# Patient Record
Sex: MALE | Race: WHITE | NOT HISPANIC OR LATINO | Employment: FULL TIME | URBAN - METROPOLITAN AREA
[De-identification: names, ages, dates, MRNs, and addresses within clinical notes are randomized per-mention and may not be internally consistent; named-entity substitution may affect disease eponyms.]

---

## 2023-05-06 ENCOUNTER — APPOINTMENT (EMERGENCY)
Dept: RADIOLOGY | Facility: HOSPITAL | Age: 78
End: 2023-05-06

## 2023-05-06 ENCOUNTER — HOSPITAL ENCOUNTER (INPATIENT)
Facility: HOSPITAL | Age: 78
LOS: 3 days | Discharge: HOME | End: 2023-05-09
Attending: EMERGENCY MEDICINE | Admitting: HOSPITALIST

## 2023-05-06 DIAGNOSIS — I50.9 ACUTE ON CHRONIC CONGESTIVE HEART FAILURE, UNSPECIFIED HEART FAILURE TYPE (HCC): ICD-10-CM

## 2023-05-06 DIAGNOSIS — I50.9 ACUTE EXACERBATION OF CHF (CONGESTIVE HEART FAILURE) (HCC): Primary | ICD-10-CM

## 2023-05-06 DIAGNOSIS — I48.91 ATRIAL FIBRILLATION (HCC): ICD-10-CM

## 2023-05-06 DIAGNOSIS — I27.20 PULMONARY HYPERTENSION (HCC): ICD-10-CM

## 2023-05-06 PROBLEM — E11.65 TYPE 2 DIABETES MELLITUS WITH HYPERGLYCEMIA, WITHOUT LONG-TERM CURRENT USE OF INSULIN (HCC): Status: ACTIVE | Noted: 2023-05-06

## 2023-05-06 PROBLEM — I10 PRIMARY HYPERTENSION: Status: ACTIVE | Noted: 2023-05-06

## 2023-05-06 LAB
2HR DELTA HS TROPONIN: 0 NG/L
4HR DELTA HS TROPONIN: 2 NG/L
ANION GAP SERPL CALCULATED.3IONS-SCNC: 7 MMOL/L (ref 4–13)
APTT PPP: 32 SECONDS (ref 23–37)
ATRIAL RATE: 174 BPM
BASOPHILS # BLD AUTO: 0.03 THOUSANDS/ÂΜL (ref 0–0.1)
BASOPHILS NFR BLD AUTO: 0 % (ref 0–1)
BNP SERPL-MCNC: 559 PG/ML (ref 0–100)
BUN SERPL-MCNC: 32 MG/DL (ref 5–25)
CALCIUM SERPL-MCNC: 9.1 MG/DL (ref 8.4–10.2)
CARDIAC TROPONIN I PNL SERPL HS: 20 NG/L
CARDIAC TROPONIN I PNL SERPL HS: 20 NG/L
CARDIAC TROPONIN I PNL SERPL HS: 22 NG/L
CHLORIDE SERPL-SCNC: 106 MMOL/L (ref 96–108)
CHOLEST SERPL-MCNC: 104 MG/DL
CO2 SERPL-SCNC: 30 MMOL/L (ref 21–32)
CREAT SERPL-MCNC: 0.99 MG/DL (ref 0.6–1.3)
EOSINOPHIL # BLD AUTO: 0.08 THOUSAND/ÂΜL (ref 0–0.61)
EOSINOPHIL NFR BLD AUTO: 1 % (ref 0–6)
ERYTHROCYTE [DISTWIDTH] IN BLOOD BY AUTOMATED COUNT: 16.5 % (ref 11.6–15.1)
GFR SERPL CREATININE-BSD FRML MDRD: 73 ML/MIN/1.73SQ M
GLUCOSE SERPL-MCNC: 126 MG/DL (ref 65–140)
GLUCOSE SERPL-MCNC: 135 MG/DL (ref 65–140)
GLUCOSE SERPL-MCNC: 144 MG/DL (ref 65–140)
GLUCOSE SERPL-MCNC: 220 MG/DL (ref 65–140)
HCT VFR BLD AUTO: 49.1 % (ref 36.5–49.3)
HDLC SERPL-MCNC: 44 MG/DL
HGB BLD-MCNC: 15.4 G/DL (ref 12–17)
IMM GRANULOCYTES # BLD AUTO: 0.04 THOUSAND/UL (ref 0–0.2)
IMM GRANULOCYTES NFR BLD AUTO: 1 % (ref 0–2)
INR PPP: 1.24 (ref 0.84–1.19)
LDLC SERPL CALC-MCNC: 47 MG/DL (ref 0–100)
LYMPHOCYTES # BLD AUTO: 0.92 THOUSANDS/ÂΜL (ref 0.6–4.47)
LYMPHOCYTES NFR BLD AUTO: 13 % (ref 14–44)
MAGNESIUM SERPL-MCNC: 1.9 MG/DL (ref 1.9–2.7)
MCH RBC QN AUTO: 31.7 PG (ref 26.8–34.3)
MCHC RBC AUTO-ENTMCNC: 31.4 G/DL (ref 31.4–37.4)
MCV RBC AUTO: 101 FL (ref 82–98)
MONOCYTES # BLD AUTO: 0.65 THOUSAND/ÂΜL (ref 0.17–1.22)
MONOCYTES NFR BLD AUTO: 9 % (ref 4–12)
NEUTROPHILS # BLD AUTO: 5.35 THOUSANDS/ÂΜL (ref 1.85–7.62)
NEUTS SEG NFR BLD AUTO: 76 % (ref 43–75)
NRBC BLD AUTO-RTO: 0 /100 WBCS
PLATELET # BLD AUTO: 190 THOUSANDS/UL (ref 149–390)
PMV BLD AUTO: 9.9 FL (ref 8.9–12.7)
POTASSIUM SERPL-SCNC: 4.1 MMOL/L (ref 3.5–5.3)
PROTHROMBIN TIME: 15.8 SECONDS (ref 11.6–14.5)
QRS AXIS: -67 DEGREES
QRSD INTERVAL: 108 MS
QT INTERVAL: 420 MS
QTC INTERVAL: 472 MS
RBC # BLD AUTO: 4.86 MILLION/UL (ref 3.88–5.62)
SODIUM SERPL-SCNC: 143 MMOL/L (ref 135–147)
T WAVE AXIS: 98 DEGREES
TRIGL SERPL-MCNC: 64 MG/DL
TSH SERPL DL<=0.05 MIU/L-ACNC: 5.13 UIU/ML (ref 0.45–4.5)
VENTRICULAR RATE: 76 BPM
WBC # BLD AUTO: 7.07 THOUSAND/UL (ref 4.31–10.16)

## 2023-05-06 RX ORDER — CLOPIDOGREL BISULFATE 75 MG/1
75 TABLET ORAL DAILY
Status: DISCONTINUED | OUTPATIENT
Start: 2023-05-06 | End: 2023-05-07

## 2023-05-06 RX ORDER — INSULIN LISPRO 100 [IU]/ML
2-12 INJECTION, SOLUTION INTRAVENOUS; SUBCUTANEOUS
Status: DISCONTINUED | OUTPATIENT
Start: 2023-05-06 | End: 2023-05-09 | Stop reason: HOSPADM

## 2023-05-06 RX ORDER — CARVEDILOL 12.5 MG/1
25 TABLET ORAL
Status: DISCONTINUED | OUTPATIENT
Start: 2023-05-06 | End: 2023-05-07

## 2023-05-06 RX ORDER — FUROSEMIDE 20 MG/1
20 TABLET ORAL DAILY
COMMUNITY
End: 2023-05-09

## 2023-05-06 RX ORDER — HYDROCHLOROTHIAZIDE 25 MG/1
25 TABLET ORAL DAILY
COMMUNITY
End: 2023-05-09

## 2023-05-06 RX ORDER — ASPIRIN 81 MG/1
81 TABLET ORAL DAILY
COMMUNITY

## 2023-05-06 RX ORDER — ASPIRIN 325 MG
325 TABLET ORAL DAILY
COMMUNITY
End: 2023-05-09

## 2023-05-06 RX ORDER — CLOPIDOGREL BISULFATE 75 MG/1
75 TABLET ORAL DAILY
COMMUNITY
End: 2023-05-09

## 2023-05-06 RX ORDER — ASPIRIN 325 MG
325 TABLET ORAL DAILY
Status: DISCONTINUED | OUTPATIENT
Start: 2023-05-06 | End: 2023-05-06

## 2023-05-06 RX ORDER — ASPIRIN 81 MG/1
81 TABLET ORAL DAILY
Status: DISCONTINUED | OUTPATIENT
Start: 2023-05-06 | End: 2023-05-09 | Stop reason: HOSPADM

## 2023-05-06 RX ORDER — FUROSEMIDE 10 MG/ML
40 INJECTION INTRAMUSCULAR; INTRAVENOUS ONCE
Status: COMPLETED | OUTPATIENT
Start: 2023-05-06 | End: 2023-05-06

## 2023-05-06 RX ORDER — FUROSEMIDE 10 MG/ML
40 INJECTION INTRAMUSCULAR; INTRAVENOUS
Status: DISCONTINUED | OUTPATIENT
Start: 2023-05-06 | End: 2023-05-08

## 2023-05-06 RX ORDER — LOSARTAN POTASSIUM 100 MG/1
100 TABLET ORAL DAILY
COMMUNITY

## 2023-05-06 RX ORDER — ROSUVASTATIN CALCIUM 10 MG/1
10 TABLET, COATED ORAL DAILY
COMMUNITY

## 2023-05-06 RX ORDER — CARVEDILOL 25 MG/1
25 TABLET ORAL DAILY
COMMUNITY
End: 2023-05-09

## 2023-05-06 RX ORDER — PRAVASTATIN SODIUM 80 MG/1
80 TABLET ORAL
Status: DISCONTINUED | OUTPATIENT
Start: 2023-05-06 | End: 2023-05-09 | Stop reason: HOSPADM

## 2023-05-06 RX ORDER — INSULIN LISPRO 100 [IU]/ML
1-5 INJECTION, SOLUTION INTRAVENOUS; SUBCUTANEOUS
Status: DISCONTINUED | OUTPATIENT
Start: 2023-05-06 | End: 2023-05-09 | Stop reason: HOSPADM

## 2023-05-06 RX ORDER — POTASSIUM CHLORIDE 20 MEQ/1
20 TABLET, EXTENDED RELEASE ORAL DAILY
Status: DISCONTINUED | OUTPATIENT
Start: 2023-05-06 | End: 2023-05-09 | Stop reason: HOSPADM

## 2023-05-06 RX ORDER — MELATONIN
1000 DAILY
Status: DISCONTINUED | OUTPATIENT
Start: 2023-05-06 | End: 2023-05-09 | Stop reason: HOSPADM

## 2023-05-06 RX ORDER — LOSARTAN POTASSIUM 50 MG/1
100 TABLET ORAL DAILY
Status: DISCONTINUED | OUTPATIENT
Start: 2023-05-06 | End: 2023-05-09 | Stop reason: HOSPADM

## 2023-05-06 RX ADMIN — FUROSEMIDE 40 MG: 10 INJECTION, SOLUTION INTRAMUSCULAR; INTRAVENOUS at 09:04

## 2023-05-06 RX ADMIN — APIXABAN 5 MG: 5 TABLET, FILM COATED ORAL at 17:06

## 2023-05-06 RX ADMIN — LOSARTAN POTASSIUM 100 MG: 50 TABLET, FILM COATED ORAL at 11:28

## 2023-05-06 RX ADMIN — CLOPIDOGREL BISULFATE 75 MG: 75 TABLET ORAL at 11:29

## 2023-05-06 RX ADMIN — CARVEDILOL 25 MG: 12.5 TABLET, FILM COATED ORAL at 11:28

## 2023-05-06 RX ADMIN — FUROSEMIDE 40 MG: 10 INJECTION, SOLUTION INTRAMUSCULAR; INTRAVENOUS at 17:06

## 2023-05-06 RX ADMIN — PRAVASTATIN SODIUM 80 MG: 80 TABLET ORAL at 17:06

## 2023-05-06 RX ADMIN — APIXABAN 5 MG: 5 TABLET, FILM COATED ORAL at 11:28

## 2023-05-06 RX ADMIN — POTASSIUM CHLORIDE 20 MEQ: 1500 TABLET, EXTENDED RELEASE ORAL at 11:28

## 2023-05-06 RX ADMIN — Medication 1000 UNITS: at 11:28

## 2023-05-06 RX ADMIN — ASPIRIN 81 MG: 81 TABLET, COATED ORAL at 11:28

## 2023-05-06 NOTE — ED PROVIDER NOTES
"History  Chief Complaint   Patient presents with   • Shortness of Breath     Worsening SOB and leg swelling X 3 weeks  Denies CP  Takes \"water pills\" every day  History provided by:  Patient   used: No    Shortness of Breath  Associated symptoms: no abdominal pain, no chest pain, no cough, no diaphoresis, no fever, no headaches, no neck pain, no rash, no sore throat, no vomiting and no wheezing      69-year-old male presenting to emergency department with shortness of breath and leg swelling  Progressively getting worse for the last 3 weeks  Unable to sleep  Difficulty lying flat  No chest pain  No cough  No fevers or chills  Takes a water pill  Unsure of diagnosis  Denies history of A-fib  Prior to Admission Medications   Prescriptions Last Dose Informant Patient Reported? Taking?    Ergocalciferol (VITAMIN D2 PO) 5/5/2023  Yes Yes   Sig: Take 1 25 mg by mouth   aspirin (ECOTRIN LOW STRENGTH) 81 mg EC tablet 5/5/2023  Yes Yes   Sig: Take 81 mg by mouth daily   aspirin 325 mg tablet 5/5/2023  Yes Yes   Sig: Take 325 mg by mouth daily   carvedilol (COREG) 25 mg tablet 5/5/2023  Yes Yes   Sig: Take 25 mg by mouth daily   clopidogrel (PLAVIX) 75 mg tablet 5/5/2023  Yes Yes   Sig: Take 75 mg by mouth daily   furosemide (LASIX) 20 mg tablet 5/5/2023  Yes Yes   Sig: Take 20 mg by mouth daily   hydrochlorothiazide (HYDRODIURIL) 25 mg tablet 5/5/2023  Yes Yes   Sig: Take 25 mg by mouth daily   losartan (COZAAR) 100 MG tablet 5/5/2023  Yes Yes   Sig: Take 100 mg by mouth daily   metFORMIN (GLUCOPHAGE) 1000 MG tablet 5/5/2023  Yes Yes   Sig: Take 2,000 mg by mouth 2 (two) times a day with meals Pt reports that he should be taking 1,000mg BID but takes total 2,000mg once a day   rosuvastatin (CRESTOR) 10 MG tablet 5/5/2023  Yes Yes   Sig: Take 10 mg by mouth daily      Facility-Administered Medications: None       Past Medical History:   Diagnosis Date   • Diabetes mellitus ([facility] [address] )    • " Hypertension        Past Surgical History:   Procedure Laterality Date   • BACK SURGERY         History reviewed  No pertinent family history  I have reviewed and agree with the history as documented  E-Cigarette/Vaping     E-Cigarette/Vaping Substances     Social History     Tobacco Use   • Smoking status: Never   • Smokeless tobacco: Never       Review of Systems   Constitutional: Negative for chills, diaphoresis and fever  HENT: Negative for congestion and sore throat  Respiratory: Positive for shortness of breath  Negative for cough, wheezing and stridor  Cardiovascular: Positive for leg swelling  Negative for chest pain and palpitations  Gastrointestinal: Negative for abdominal pain, blood in stool, diarrhea, nausea and vomiting  Genitourinary: Negative for dysuria, frequency and urgency  Musculoskeletal: Negative for neck pain and neck stiffness  Skin: Negative for pallor and rash  Neurological: Negative for dizziness, syncope, weakness, light-headedness and headaches  All other systems reviewed and are negative  Physical Exam  Physical Exam  Vitals reviewed  Constitutional:       Appearance: He is well-developed  HENT:      Head: Normocephalic and atraumatic  Eyes:      Extraocular Movements: Extraocular movements intact  Pupils: Pupils are equal, round, and reactive to light  Cardiovascular:      Rate and Rhythm: Normal rate  Rhythm irregular  Heart sounds: Normal heart sounds  Pulmonary:      Effort: Pulmonary effort is normal  No respiratory distress  Breath sounds: Normal breath sounds  Comments: Crackles throughout  Abdominal:      General: Bowel sounds are normal       Palpations: Abdomen is soft  Tenderness: There is no abdominal tenderness  Musculoskeletal:         General: Normal range of motion  Cervical back: Neck supple  Right lower leg: No tenderness  Edema present  Left lower leg: No tenderness  Edema present  Skin:     General: Skin is warm and dry  Capillary Refill: Capillary refill takes less than 2 seconds  Neurological:      General: No focal deficit present  Mental Status: He is alert and oriented to person, place, and time           Vital Signs  ED Triage Vitals   Temperature Pulse Respirations Blood Pressure SpO2   05/06/23 0733 05/06/23 0718 05/06/23 0718 05/06/23 0718 05/06/23 0718   97 5 °F (36 4 °C) 81 18 164/96 92 %      Temp Source Heart Rate Source Patient Position - Orthostatic VS BP Location FiO2 (%)   05/06/23 0733 05/06/23 0718 05/06/23 0904 05/06/23 0718 --   Oral Monitor Sitting Right arm       Pain Score       05/06/23 0718       No Pain           Vitals:    05/06/23 0718 05/06/23 0904 05/06/23 1016   BP: 164/96 160/96 (!) 165/105   Pulse: 81 66 70   Patient Position - Orthostatic VS:  Sitting Sitting         Visual Acuity      ED Medications  Medications   carvedilol (COREG) tablet 25 mg (25 mg Oral Given 5/6/23 1128)   losartan (COZAAR) tablet 100 mg (100 mg Oral Given 5/6/23 1128)   clopidogrel (PLAVIX) tablet 75 mg (75 mg Oral Given 5/6/23 1129)   aspirin (ECOTRIN LOW STRENGTH) EC tablet 81 mg (81 mg Oral Given 5/6/23 1128)   cholecalciferol (VITAMIN D3) tablet 1,000 Units (1,000 Units Oral Given 5/6/23 1128)   pravastatin (PRAVACHOL) tablet 80 mg (has no administration in time range)   furosemide (LASIX) injection 40 mg (has no administration in time range)   insulin lispro (HumaLOG) 100 units/mL subcutaneous injection 2-12 Units (0 Units Subcutaneous Not Given 5/6/23 1135)   insulin lispro (HumaLOG) 100 units/mL subcutaneous injection 1-5 Units (has no administration in time range)   potassium chloride (K-DUR,KLOR-CON) CR tablet 20 mEq (20 mEq Oral Given 5/6/23 1128)   apixaban (ELIQUIS) tablet 5 mg (5 mg Oral Given 5/6/23 1128)   furosemide (LASIX) injection 40 mg (40 mg Intravenous Given 5/6/23 7109)       Diagnostic Studies  Results Reviewed     Procedure Component Value Units Date/Time    HS Troponin I 4hr [[id number]] Collected: 05/06/23 1443    Lab Status:  In process Specimen: Blood from Hand, Right Updated: 05/06/23 1459    TSH, 3rd generation [[id number]]  (Abnormal) Collected: 05/06/23 0735    Lab Status: Final result Specimen: Blood from Arm, Left Updated: 05/06/23 1431     TSH 3RD GENERATON 5 135 uIU/mL     Lipid Panel with Direct LDL reflex [[id number]]  (Normal) Collected: 05/06/23 0735    Lab Status: Final result Specimen: Blood from Arm, Left Updated: 05/06/23 1431     Cholesterol 104 mg/dL      Triglycerides 64 mg/dL      HDL, Direct 44 mg/dL      LDL Calculated 47 mg/dL     HS Troponin I 2hr [[id number]]  (Normal) Collected: 05/06/23 1124    Lab Status: Final result Specimen: Blood from Hand, Right Updated: 05/06/23 1216     hs TnI 2hr 20 ng/L      Delta 2hr hsTnI 0 ng/L     B-Type Natriuretic Peptide(BNP) [[id number]]  (Abnormal) Collected: 05/06/23 0735    Lab Status: Final result Specimen: Blood from Arm, Left Updated: 05/06/23 1044      pg/mL     HS Troponin 0hr (reflex protocol) [[id number]]  (Normal) Collected: 05/06/23 0735    Lab Status: Final result Specimen: Blood from Arm, Left Updated: 05/06/23 0840     hs TnI 0hr 20 ng/L     Protime-INR [[id number]]  (Abnormal) Collected: 05/06/23 0735    Lab Status: Final result Specimen: Blood from Arm, Left Updated: 05/06/23 0839     Protime 15 8 seconds      INR 1 24    APTT [[id number]]  (Normal) Collected: 05/06/23 0735    Lab Status: Final result Specimen: Blood from Arm, Left Updated: 05/06/23 0839     PTT 32 seconds     Basic metabolic panel [[id number]]  (Abnormal) Collected: 05/06/23 0735    Lab Status: Final result Specimen: Blood from Arm, Left Updated: 05/06/23 0831     Sodium 143 mmol/L      Potassium 4 1 mmol/L      Chloride 106 mmol/L      CO2 30 mmol/L      ANION GAP 7 mmol/L      BUN 32 mg/dL      Creatinine 0 99 mg/dL      Glucose 220 mg/dL      Calcium 9 1 mg/dL      eGFR 73 ml/min/1 73sq m     Narrative: National Kidney Disease Foundation guidelines for Chronic Kidney Disease (CKD):   •  Stage 1 with normal or high GFR (GFR > 90 mL/min/1 73 square meters)  •  Stage 2 Mild CKD (GFR = 60-89 mL/min/1 73 square meters)  •  Stage 3A Moderate CKD (GFR = 45-59 mL/min/1 73 square meters)  •  Stage 3B Moderate CKD (GFR = 30-44 mL/min/1 73 square meters)  •  Stage 4 Severe CKD (GFR = 15-29 mL/min/1 73 square meters)  •  Stage 5 End Stage CKD (GFR <15 mL/min/1 73 square meters)  Note: GFR calculation is accurate only with a steady state creatinine    Magnesium [[id number]]  (Normal) Collected: 05/06/23 0735    Lab Status: Final result Specimen: Blood from Arm, Left Updated: 05/06/23 0831     Magnesium 1 9 mg/dL     CBC and differential [[id number]]  (Abnormal) Collected: 05/06/23 0735    Lab Status: Final result Specimen: Blood from Arm, Right Updated: 05/06/23 0802     WBC 7 07 Thousand/uL      RBC 4 86 Million/uL      Hemoglobin 15 4 g/dL      Hematocrit 49 1 %       fL      MCH 31 7 pg      MCHC 31 4 g/dL      RDW 16 5 %      MPV 9 9 fL      Platelets 668 Thousands/uL      nRBC 0 /100 WBCs      Neutrophils Relative 76 %      Immat GRANS % 1 %      Lymphocytes Relative 13 %      Monocytes Relative 9 %      Eosinophils Relative 1 %      Basophils Relative 0 %      Neutrophils Absolute 5 35 Thousands/µL      Immature Grans Absolute 0 04 Thousand/uL      Lymphocytes Absolute 0 92 Thousands/µL      Monocytes Absolute 0 65 Thousand/µL      Eosinophils Absolute 0 08 Thousand/µL      Basophils Absolute 0 03 Thousands/µL                  XR chest 1 view portable   Final Result by Teja Light MD (05/06 1022)      Marked enlargement of the cardiac silhouette, question cardiomegaly versus pericardial effusion  Question mild pulmonary venous congestion with trace right effusion                 Workstation performed: [id number]                    Procedures  Procedures         ED Course  ED Course as of 05/06/23 0163 Sat May 06, 2023   0750 Bedside echo shows no pericardial effusion   0803 ECG shows rate of 76, A-fib, left axis deviation, nonspecific QRS widening, nonspecific ST and T waves, independently interpret by me                                             Medical Decision Making  73-year-old with shortness of breath, likely CHF exacerbation we will do cardiac evaluation, check BNP, chest x-ray    Electrolytes are okay, will give dose of Lasix  Chest x-ray concerning for pulmonary edema, will need admission to hospital     Amount and/or Complexity of Data Reviewed  Labs: ordered  Radiology: ordered  Risk  Prescription drug management  Decision regarding hospitalization  Disposition  Final diagnoses:   Acute exacerbation of CHF (congestive heart failure) ([facility] )     Time reflects when diagnosis was documented in both MDM as applicable and the Disposition within this note     Time User Action Codes Description Comment    5/6/2023  8:56 AM Abigail Cruz Add [I50 9] Acute exacerbation of CHF (congestive heart failure) ([place] [address] )     5/6/2023 10:59 AM Evans ARROYO Add [I48 91] Atrial fibrillation [facility])       ED Disposition     ED Disposition   Admit    Condition   Stable    Date/Time   Sat May 6, 2023  8:56 AM    Comment   Case was discussed with Dr Rochelle Ching and the patient's admission status was agreed to be Admission Status: inpatient status to the service of Dr Rochelle Cihng              Follow-up Information    None         Current Discharge Medication List      CONTINUE these medications which have NOT CHANGED    Details   aspirin (ECOTRIN LOW STRENGTH) 81 mg EC tablet Take 81 mg by mouth daily      aspirin 325 mg tablet Take 325 mg by mouth daily      carvedilol (COREG) 25 mg tablet Take 25 mg by mouth daily      clopidogrel (PLAVIX) 75 mg tablet Take 75 mg by mouth daily      Ergocalciferol (VITAMIN D2 PO) Take 1 25 mg by mouth      furosemide (LASIX) 20 mg tablet Take 20 mg by mouth daily hydrochlorothiazide (HYDRODIURIL) 25 mg tablet Take 25 mg by mouth daily      losartan (COZAAR) 100 MG tablet Take 100 mg by mouth daily      metFORMIN (GLUCOPHAGE) 1000 MG tablet Take 2,000 mg by mouth 2 (two) times a day with meals Pt reports that he should be taking 1,000mg BID but takes total 2,000mg once a day      rosuvastatin (CRESTOR) 10 MG tablet Take 10 mg by mouth daily             No discharge procedures on file      PDMP Review     None          ED Provider  Electronically Signed by           Allison Nickerson MD  05/06/23 9115

## 2023-05-06 NOTE — ASSESSMENT & PLAN NOTE
· POA, patient denies any prior history of atrial fibrillation although he is a poor historian  · Home medications include Coreg, the patient however denies any prior cardiac history  · EKG shows atrial fibrillation with controlled rate  · Was not on any anticoagulation prior to admission  · [id number] score is 5-6 (likely has history of vascular disease on aspirin and Plavix)  · He will be started on anticoagulation with Eliquis    Continue Coreg  · Monitor on telemetry, check echocardiogram   Cardiology consulted

## 2023-05-06 NOTE — ASSESSMENT & PLAN NOTE
· Blood pressure slightly elevated on admission in the 215A systolic  · Home medications include Coreg 25 mg daily, HCTZ 25 mg daily, Cozaar 100 mg daily, Lasix 20 mg daily  · He will be resumed on all oral home meds with IV Lasix for CHF exacerbation

## 2023-05-06 NOTE — H&P
[facility]  H&P  Name: Jose Lakhani 69 y o  male I MRN: [id number]  Unit/Bed#: S -01 I Date of Admission: 5/6/2023   Date of Service: 5/6/2023 I Hospital Day: 0      Assessment/Plan   * Acute congestive heart failure (HCC)  Assessment & Plan  · Presents with progressively worsening shortness of breath, weight gain, bilateral lower extremity swelling  · Chest x-ray in the ED shows vascular congestion by my read  Official radiology report pending  · Patient clinically examines volume overloaded  Has a history of CHF on Lasix  · Marked cardiomegaly also noted on chest x-ray  · EKG shows rate controlled atrial fibrillation  Unclear if new  Initial troponin negative  · Admit to telemetry, start on Lasix 40 mg IV twice daily  · Check echocardiogram, TSH, BNP, daily weights, I's and O's, 2 g sodium diet    Atrial fibrillation (HCC)  Assessment & Plan  · POA, patient denies any prior history of atrial fibrillation although he is a poor historian  · Home medications include Coreg, the patient however denies any prior cardiac history  · EKG shows atrial fibrillation with controlled rate  · Was not on any anticoagulation prior to admission  · [id number] score is 5-6 (likely has history of vascular disease on aspirin and Plavix)  · He will be started on anticoagulation with Eliquis  Continue Coreg  · Monitor on telemetry, check echocardiogram   Cardiology consulted    Type 2 diabetes mellitus with hyperglycemia, without long-term current use of insulin (HCC)  Assessment & Plan  No results found for: HGBA1C    No results for input(s): POCGLU in the last 72 hours  Blood Sugar Average: Last 72 hrs:    · On metformin 2000 mg daily    Will check hemoglobin A1c  · Accu-Cheks ACHS, hold metformin, SSI coverage, might require basal/bolus insulin  · Monitor closely    Primary hypertension  Assessment & Plan  · Blood pressure slightly elevated on admission in the 104E systolic  · Home medications include Coreg 25 mg daily, HCTZ 25 mg daily, Cozaar 100 mg daily, Lasix 20 mg daily  · He will be resumed on all oral home meds with IV Lasix for CHF exacerbation         VTE Prophylaxis: Apixaban (Eliquis)  / sequential compression device     Code Status: Full code    Anticipated Length of Stay:  Patient will be admitted on an Inpatient basis with an anticipated length of stay of  > 2 midnights  Justification for Hospital Stay: Acute CHF    Chief Complaint:     Shortness of breath, lower extremity swelling    History of Present Illness:  Brigida Garrett is a 69 y o  male with PMH significant for hypertension, type 2 diabetes who presents with progressively worsening shortness of breath, dyspnea with exertion, lower extremity swelling and weight gain  The patient also endorses orthopnea and PND  He is a poor historian and [place] speaking only  History obtained via [employer]  services  He reports progressively worsening symptoms over the past month  He is on cardiac medications but denies any prior history of heart disease or A-fib  He reports bilateral lower extremity has become significantly swollen and he has a lot of pressure around his groin area which has made it difficult to void  He denies any chest pain, no palpitations, no fever or chills, no nausea or vomiting  Review of Systems   Constitutional: Negative for appetite change, chills, diaphoresis, fatigue and fever  HENT: Negative  Respiratory: Positive for shortness of breath  Negative for cough  Cardiovascular: Positive for leg swelling  Negative for chest pain and palpitations  Gastrointestinal: Negative  Genitourinary: Positive for difficulty urinating, penile swelling and scrotal swelling  Negative for dysuria  Musculoskeletal: Negative  Skin: Negative  Neurological: Negative  Psychiatric/Behavioral: Negative  All other systems reviewed and are negative  History reviewed   No pertinent past medical history  Past Surgical History:   Procedure Laterality Date   • BACK SURGERY         Family History:  Unable to obtain    Home Meds:  all medications and allergies reviewed    Allergies: Allergies   Allergen Reactions   • Penicillins Rash       Marital Status: Single     Social History     Substance and Sexual Activity   Alcohol Use None     Social History     Tobacco Use   Smoking Status Never   Smokeless Tobacco Never     Social History     Substance and Sexual Activity   Drug Use Not on file       Physical Exam:   Vitals:   Blood Pressure: (!) 165/105 (05/06/23 1016)  Pulse: 70 (05/06/23 1016)  Temperature: 97 5 °F (36 4 °C) (05/06/23 0733)  Temp Source: Oral (05/06/23 0733)  Respirations: 20 (05/06/23 1016)  Weight - Scale: 133 kg (292 lb 5 3 oz) (05/06/23 0718)  SpO2: 95 % (05/06/23 1016)    Physical Exam  Vitals reviewed  Constitutional:       General: He is not in acute distress  Appearance: Normal appearance  He is obese  He is not ill-appearing, toxic-appearing or diaphoretic  HENT:      Head: Normocephalic and atraumatic  Mouth/Throat:      Mouth: Mucous membranes are moist    Eyes:      General:         Right eye: No discharge  Left eye: No discharge  Cardiovascular:      Rate and Rhythm: Normal rate  Rhythm irregularly irregular  Heart sounds: Murmur heard  Pulmonary:      Effort: Pulmonary effort is normal       Breath sounds: Examination of the right-middle field reveals rales  Examination of the left-middle field reveals rales  Examination of the right-lower field reveals rales  Examination of the left-lower field reveals rales  Rales present  Abdominal:      General: There is distension  Palpations: Abdomen is soft  There is no mass  Tenderness: There is no abdominal tenderness  There is no guarding  Musculoskeletal:      Cervical back: Neck supple  Right lower leg: 3+ Edema present  Left lower leg: 3+ Edema present  Neurological:      General: No focal deficit present  Mental Status: He is alert and oriented to person, place, and time  Mental status is at baseline  Lab Results: I have personally reviewed pertinent reports  Results from last 7 days   Lab Units 05/06/23  0735   WBC Thousand/uL 7 07   HEMOGLOBIN g/dL 15 4   HEMATOCRIT % 49 1   PLATELETS Thousands/uL 190   NEUTROS PCT % 76*   LYMPHS PCT % 13*   MONOS PCT % 9   EOS PCT % 1     Results from last 7 days   Lab Units 05/06/23  0735   POTASSIUM mmol/L 4 1   CHLORIDE mmol/L 106   CO2 mmol/L 30   BUN mg/dL 32*   CREATININE mg/dL 0 99   CALCIUM mg/dL 9 1     Results from last 7 days   Lab Units 05/06/23  0735   INR  1 24*       Imaging: I have personally reviewed pertinent reports  and I have personally reviewed pertinent films in PACS    EKG, Pathology, and Other Studies Reviewed on Admission:   · Atrial fibrillation with controlled ventricular response    ** Please Note: Dragon 360 Dictation voice to text software may have been used in the creation of this document   **

## 2023-05-06 NOTE — ASSESSMENT & PLAN NOTE
· Presents with progressively worsening shortness of breath, weight gain, bilateral lower extremity swelling  · Chest x-ray in the ED shows vascular congestion by my read  Official radiology report pending  · Patient clinically examines volume overloaded  Has a history of CHF on Lasix  · Marked cardiomegaly also noted on chest x-ray  · EKG shows rate controlled atrial fibrillation  Unclear if new    Initial troponin negative  · Admit to telemetry, start on Lasix 40 mg IV twice daily  · Check echocardiogram, TSH, BNP, daily weights, I's and O's, 2 g sodium diet

## 2023-05-06 NOTE — ASSESSMENT & PLAN NOTE
No results found for: HGBA1C    No results for input(s): POCGLU in the last 72 hours  Blood Sugar Average: Last 72 hrs:    · On metformin 2000 mg daily    Will check hemoglobin A1c  · Accu-Cheks ACHS, hold metformin, SSI coverage, might require basal/bolus insulin  · Monitor closely

## 2023-05-07 ENCOUNTER — APPOINTMENT (INPATIENT)
Dept: NON INVASIVE DIAGNOSTICS | Facility: HOSPITAL | Age: 78
End: 2023-05-07

## 2023-05-07 PROBLEM — I34.0 MITRAL REGURGITATION: Status: ACTIVE | Noted: 2023-05-07

## 2023-05-07 PROBLEM — I27.20 PULMONARY HYPERTENSION (HCC): Status: ACTIVE | Noted: 2023-05-07

## 2023-05-07 PROBLEM — E66.812 CLASS 2 OBESITY IN ADULT: Status: ACTIVE | Noted: 2023-05-07

## 2023-05-07 PROBLEM — R29.818 SUSPECTED SLEEP APNEA: Status: ACTIVE | Noted: 2023-05-07

## 2023-05-07 PROBLEM — E66.9 CLASS 2 OBESITY IN ADULT: Status: ACTIVE | Noted: 2023-05-07

## 2023-05-07 LAB
ALBUMIN SERPL BCP-MCNC: 3.8 G/DL (ref 3.5–5)
ALP SERPL-CCNC: 163 U/L (ref 34–104)
ALT SERPL W P-5'-P-CCNC: 17 U/L (ref 7–52)
ANION GAP SERPL CALCULATED.3IONS-SCNC: 7 MMOL/L (ref 4–13)
AORTIC ROOT: 3.8 CM
APICAL FOUR CHAMBER EJECTION FRACTION: 46 %
ASCENDING AORTA: 3.6 CM
AST SERPL W P-5'-P-CCNC: 36 U/L (ref 13–39)
AV LVOT MEAN GRADIENT: 2 MMHG
AV LVOT PEAK GRADIENT: 3 MMHG
BILIRUB SERPL-MCNC: 1.89 MG/DL (ref 0.2–1)
BUN SERPL-MCNC: 28 MG/DL (ref 5–25)
CALCIUM SERPL-MCNC: 8.9 MG/DL (ref 8.4–10.2)
CHLORIDE SERPL-SCNC: 104 MMOL/L (ref 96–108)
CO2 SERPL-SCNC: 33 MMOL/L (ref 21–32)
CREAT SERPL-MCNC: 0.92 MG/DL (ref 0.6–1.3)
DOP CALC LVOT PEAK VEL VTI: 18.33 CM
DOP CALC LVOT PEAK VEL: 0.87 M/S
EST. AVERAGE GLUCOSE BLD GHB EST-MCNC: 134 MG/DL
FRACTIONAL SHORTENING: 27 % (ref 28–44)
GFR SERPL CREATININE-BSD FRML MDRD: 80 ML/MIN/1.73SQ M
GLUCOSE SERPL-MCNC: 119 MG/DL (ref 65–140)
GLUCOSE SERPL-MCNC: 121 MG/DL (ref 65–140)
GLUCOSE SERPL-MCNC: 127 MG/DL (ref 65–140)
GLUCOSE SERPL-MCNC: 128 MG/DL (ref 65–140)
GLUCOSE SERPL-MCNC: 132 MG/DL (ref 65–140)
HBA1C MFR BLD: 6.3 %
INTERVENTRICULAR SEPTUM IN DIASTOLE (PARASTERNAL SHORT AXIS VIEW): 1.2 CM
INTERVENTRICULAR SEPTUM: 1.2 CM (ref 0.6–1.1)
LAAS-AP2: 55.3 CM2
LAAS-AP4: 51.3 CM2
LEFT ATRIUM AREA SYSTOLE SINGLE PLANE A4C: 51 CM2
LEFT ATRIUM SIZE: 7.2 CM
LEFT INTERNAL DIMENSION IN SYSTOLE: 4.4 CM (ref 2.1–4)
LEFT VENTRICLE DIASTOLIC VOLUME (MOD BIPLANE): 168 ML
LEFT VENTRICLE SYSTOLIC VOLUME (MOD BIPLANE): 82 ML
LEFT VENTRICULAR INTERNAL DIMENSION IN DIASTOLE: 6 CM (ref 3.5–6)
LEFT VENTRICULAR POSTERIOR WALL IN END DIASTOLE: 1.2 CM
LEFT VENTRICULAR STROKE VOLUME: 97 ML
LV EF: 51 %
LVSV (TEICH): 97 ML
POTASSIUM SERPL-SCNC: 5 MMOL/L (ref 3.5–5.3)
PROT SERPL-MCNC: 6.5 G/DL (ref 6.4–8.4)
RA PRESSURE ESTIMATED: 12 MMHG
RIGHT ATRIUM AREA SYSTOLE A4C: 39.5 CM2
RIGHT VENTRICLE ID DIMENSION: 5 CM
RV PSP: 70 MMHG
SL CV LEFT ATRIUM LENGTH A2C: 9.4 CM
SL CV LV EF: 50
SL CV PED ECHO LEFT VENTRICLE DIASTOLIC VOLUME (MOD BIPLANE) 2D: 183 ML
SL CV PED ECHO LEFT VENTRICLE SYSTOLIC VOLUME (MOD BIPLANE) 2D: 86 ML
SODIUM SERPL-SCNC: 144 MMOL/L (ref 135–147)
TR MAX PG: 58 MMHG
TR PEAK VELOCITY: 3.8 M/S
TRICUSPID ANNULAR PLANE SYSTOLIC EXCURSION: 2.3 CM
TRICUSPID VALVE PEAK REGURGITATION VELOCITY: 3.81 M/S

## 2023-05-07 RX ORDER — CARVEDILOL 6.25 MG/1
6.25 TABLET ORAL 2 TIMES DAILY WITH MEALS
Status: DISCONTINUED | OUTPATIENT
Start: 2023-05-07 | End: 2023-05-09 | Stop reason: HOSPADM

## 2023-05-07 RX ADMIN — ASPIRIN 81 MG: 81 TABLET, COATED ORAL at 09:56

## 2023-05-07 RX ADMIN — APIXABAN 5 MG: 5 TABLET, FILM COATED ORAL at 09:56

## 2023-05-07 RX ADMIN — POTASSIUM CHLORIDE 20 MEQ: 1500 TABLET, EXTENDED RELEASE ORAL at 09:56

## 2023-05-07 RX ADMIN — Medication 1000 UNITS: at 09:56

## 2023-05-07 RX ADMIN — FUROSEMIDE 40 MG: 10 INJECTION, SOLUTION INTRAMUSCULAR; INTRAVENOUS at 16:59

## 2023-05-07 RX ADMIN — CARVEDILOL 6.25 MG: 6.25 TABLET, FILM COATED ORAL at 16:58

## 2023-05-07 RX ADMIN — APIXABAN 5 MG: 5 TABLET, FILM COATED ORAL at 17:00

## 2023-05-07 RX ADMIN — FUROSEMIDE 40 MG: 10 INJECTION, SOLUTION INTRAMUSCULAR; INTRAVENOUS at 09:56

## 2023-05-07 RX ADMIN — PRAVASTATIN SODIUM 80 MG: 80 TABLET ORAL at 16:59

## 2023-05-07 RX ADMIN — CLOPIDOGREL BISULFATE 75 MG: 75 TABLET ORAL at 09:56

## 2023-05-07 RX ADMIN — LOSARTAN POTASSIUM 100 MG: 50 TABLET, FILM COATED ORAL at 09:56

## 2023-05-07 NOTE — ASSESSMENT & PLAN NOTE
Lab Results   Component Value Date    HGBA1C 6 3 (H) 05/06/2023       Recent Labs     05/06/23  1545 05/06/23  2127 05/07/23  0745 05/07/23  1058   POCGLU 144* 135 119 128       Blood Sugar Average: Last 72 hrs:  (P) 130 4  · On metformin 2000 mg daily   BS stable here  · Accu-Cheks ACHS, hold metformin, SSI coverage  · Monitor closely

## 2023-05-07 NOTE — ASSESSMENT & PLAN NOTE
· Presents with progressively worsening shortness of breath/orthopnea, significant weight gain, bilateral lower extremity swelling  · Chest x-ray: marked cardiomegaly and vascular congestion  · Continue Lasix 40 mg IV twice daily  · Monitor weight/output  · await echocardiogram  · Record review from remote stay at [facility] suggests prior history of systolic heart failure but no echocardiogram available to be viewed

## 2023-05-07 NOTE — ASSESSMENT & PLAN NOTE
· noted on echo to have mod to severe MR  · Outpatient follow up will be required  · Cardiology following

## 2023-05-07 NOTE — ASSESSMENT & PLAN NOTE
· Blood pressure modestly elevated   · Home medications include Coreg 25 mg daily, HCTZ 25 mg daily, Cozaar 100 mg daily, Lasix 20 mg daily  · Adjustment in medications to reflect for bradycardia, and need for additional diuresis

## 2023-05-07 NOTE — PROGRESS NOTES
[facility]  Progress Note  Name: Karey Ferrari  MRN: [id number]  Unit/Bed#: S -01 I Date of Admission: 5/6/2023   Date of Service: 5/7/2023 I Hospital Day: 1    Assessment/Plan   * [facility] ([facility] )  Assessment & Plan  · Presents with progressively worsening shortness of breath/orthopnea, significant weight gain, bilateral lower extremity swelling  · Chest x-ray: marked cardiomegaly and vascular congestion  · Continue Lasix 40 mg IV twice daily  · Monitor weight/output  · await echocardiogram  · Record review from remote stay at [facility] suggests prior history of systolic heart failure but no echocardiogram available to be viewed    Atrial fibrillation ([facility] )  Assessment & Plan  · POA, patient denied any prior history of atrial fibrillation   · Home medications include Coreg, but dosing was once daily and should be changed to twice daily at a lower dose  · EKG on admission showed atrial fibrillation with controlled rate, however he was noted on tele to have slow ventricular response with bradycardia/pauses  Adjust beta-blocker accordingly  · Was not on any anticoagulation prior to admission, unknown why he was on Plavix but recommend discontinuing at this time  · [id number] score is 5-6 (likely has history of vascular disease on aspirin and Plavix)  · He will be started on anticoagulation with Eliquis  · Monitor on telemetry, await echocardiogram   Cardiology consult appreciated    Class 2 obesity in adult  Assessment & Plan  · bmi 37   Recommend weight loss    Primary hypertension  Assessment & Plan  · Blood pressure modestly elevated   · Home medications include Coreg 25 mg daily, HCTZ 25 mg daily, Cozaar 100 mg daily, Lasix 20 mg daily  · Adjustment in medications to reflect for bradycardia, and need for additional diuresis    Type 2 diabetes mellitus with hyperglycemia, without long-term current use of insulin [facility])  Assessment & Plan  Lab Results Component Value Date    HGBA1C 6 3 (H) 05/06/2023       Recent Labs     05/06/23  1545 05/06/23  2127 05/07/23  0745 05/07/23  1058   POCGLU 144* 135 119 128       Blood Sugar Average: Last 72 hrs:  (P) 130 4  · On metformin 2000 mg daily  BS stable here  · Accu-Cheks ACHS, hold metformin, SSI coverage  · Monitor closely    Suspected sleep apnea  Assessment & Plan  · Recommend formal outpatient sleep study       VTE Pharmacologic Prophylaxis: VTE Score: 4 eliquis    Patient Centered Rounds: I performed bedside rounds with nursing staff today  Discussions with Specialists or Other Care Team Provider: Rounded with cardiology attending    Education and Discussions with Family / Patient: Spoke with patient's friend Thao Saravia on the phone in the room who also acted as  for the patient  Total Time Spent on Date of Encounter in care of patient: 35 minutes This time was spent on one or more of the following: performing physical exam; counseling and coordination of care; obtaining or reviewing history; documenting in the medical record; reviewing/ordering tests, medications or procedures; communicating with other healthcare professionals and discussing with patient's family/caregivers  Current Length of Stay: 1 day(s)  Current Patient Status: Inpatient   Certification Statement: The patient will continue to require additional inpatient hospital stay due to Heart failure management with IV diuretics  Discharge Plan: Anticipate discharge in 48-72 hrs to home  Code Status: Level 1 - Full Code    Subjective:   History obtained in [place] with patient's friend on the phone acting as   Patient reports he urinated about 4 or 5 times so far and says he can now at least buckle his belt  Notes some improvement in his shortness of breath and denies any chest pain  He has been reportedly sleeping upright in a chair and admits to episodes of awakening with gasp      Objective:     Vitals:   Temp (24hrs), Av 6 °F (36 4 °C), Min:97 5 °F (36 4 °C), Max:97 8 °F (36 6 °C)    Temp:  [97 5 °F (36 4 °C)-97 8 °F (36 6 °C)] 97 5 °F (36 4 °C)  HR:  [61-67] 67  Resp:  [18] 18  BP: (131-172)/() 150/108  SpO2:  [91 %-95 %] 91 %  Body mass index is 37 76 kg/m²  Input and Output Summary (last 24 hours): Intake/Output Summary (Last 24 hours) at 2023 1221  Last data filed at 2023 3876  Gross per 24 hour   Intake 180 ml   Output 2150 ml   Net -1970 ml       Physical Exam:   Physical Exam  Vitals reviewed  Constitutional:       General: He is not in acute distress  Appearance: He is obese  He is not ill-appearing, toxic-appearing or diaphoretic  Eyes:      General: No scleral icterus  Right eye: No discharge  Left eye: No discharge  Conjunctiva/sclera: Conjunctivae normal    Cardiovascular:      Rate and Rhythm: Bradycardia present  Rhythm irregular  Heart sounds: No murmur heard  Pulmonary:      Effort: No respiratory distress  Breath sounds: No stridor  No wheezing or rhonchi  Abdominal:      General: There is no distension  Palpations: Abdomen is soft  Tenderness: There is no abdominal tenderness  There is no guarding  Musculoskeletal:      Right lower leg: Edema present  Left lower leg: Edema present  Skin:     General: Skin is warm and dry  Coloration: Skin is not jaundiced or pale  Findings: No bruising, erythema, lesion or rash  Neurological:      General: No focal deficit present  Mental Status: He is alert  Mental status is at baseline     Psychiatric:         Mood and Affect: Mood normal           Additional Data:     Labs:  Results from last 7 days   Lab Units 23  0735   WBC Thousand/uL 7 07   HEMOGLOBIN g/dL 15 4   HEMATOCRIT % 49 1   PLATELETS Thousands/uL 190   NEUTROS PCT % 76*   LYMPHS PCT % 13*   MONOS PCT % 9   EOS PCT % 1     Results from last 7 days   Lab Units 23  0546   SODIUM mmol/L 144 POTASSIUM mmol/L 5 0   CHLORIDE mmol/L 104   CO2 mmol/L 33*   BUN mg/dL 28*   CREATININE mg/dL 0 92   ANION GAP mmol/L 7   CALCIUM mg/dL 8 9   ALBUMIN g/dL 3 8   TOTAL BILIRUBIN mg/dL 1 89*   ALK PHOS U/L 163*   ALT U/L 17   AST U/L 36   GLUCOSE RANDOM mg/dL 121     Results from last 7 days   Lab Units 05/06/23  0735   INR  1 24*     Results from last 7 days   Lab Units 05/07/23  1058 05/07/23  0745 05/06/23  2127 05/06/23  1545 05/06/23  1135   POC GLUCOSE mg/dl 128 119 135 144* 126     Results from last 7 days   Lab Units 05/06/23  1443   HEMOGLOBIN A1C % 6 3*           Lines/Drains:  Invasive Devices     Peripheral Intravenous Line  Duration           Peripheral IV 05/06/23 Right;Ventral (anterior) Forearm 1 day                  Telemetry:  Telemetry Orders (From admission, onward)             48 Hour Telemetry Monitoring  Continuous x 48 hours        References:    Telemetry Guidelines   Question:  Reason for 48 Hour Telemetry  Answer:  Acute Decompensated CHF (continuous diuretic infusion or total diuretic dose > 200 mg daily, associated electrolyte derangement, ionotropic drip, history of ventricular arrhythmia, or new EF <35%)                 Telemetry Reviewed: Bradycardia/A-fib  Indication for Continued Telemetry Use: Management of A-fib with bradycardia             Imaging: Chest x-ray    Recent Cultures (last 7 days):         Last 24 Hours Medication List:   Current Facility-Administered Medications   Medication Dose Route Frequency Provider Last Rate   • apixaban  5 mg Oral BID Narcisa Ravi MD     • aspirin  81 mg Oral Daily Narcisa Ravi MD     • carvedilol  6 25 mg Oral BID With Meals Narcisa Coffman PA-C     • cholecalciferol  1,000 Units Oral Daily Narcisa Ravi MD     • furosemide  40 mg Intravenous BID (diuretic) Narcisa Ravi MD     • insulin lispro  1-5 Units Subcutaneous HS Narcisa Ravi MD     • insulin lispro  2-12 Units Subcutaneous TID JAYLA Brewster Earnestine Griffin MD     • losartan  100 mg Oral Daily Narcisa Griffin MD     • potassium chloride  20 mEq Oral Daily Narcisa Griffin MD     • pravastatin  80 mg Oral Daily With Abdirahman Marinelli MD          Today, Patient Was Seen By: Sindy Jesus PA-C    **Please Note: This note may have been constructed using a voice recognition system  **

## 2023-05-07 NOTE — ASSESSMENT & PLAN NOTE
· POA, patient denied any prior history of atrial fibrillation   · Home medications include Coreg, but dosing was once daily and should be changed to twice daily at a lower dose  · EKG on admission showed atrial fibrillation with controlled rate, however he was noted on tele to have slow ventricular response with bradycardia/pauses  Adjust beta-blocker accordingly  · Was not on any anticoagulation prior to admission, unknown why he was on Plavix but recommend discontinuing at this time  · [id number] score is 5-6 (likely has history of vascular disease on aspirin and Plavix)  · He will be started on anticoagulation with Eliquis      · Monitor on telemetry, await echocardiogram   Cardiology consult appreciated

## 2023-05-07 NOTE — ASSESSMENT & PLAN NOTE
· Echocardiogram shows severe tricuspid regurgitation  The right ventricular systolic pressure is severely elevated at 70 00 mmHg  · Requires outpatient sleep study to assess for sleep apnea  In the meantime will do overnight pulse ox and am ABG  · Defer to [facility]--eventual [facility]?

## 2023-05-07 NOTE — UTILIZATION REVIEW
"Initial Clinical Review    Admission: Date/Time/Statement:   Admission Orders (From admission, onward)     Ordered        05/06/23 0857  INPATIENT ADMISSION  Once                      Orders Placed This Encounter   Procedures   • INPATIENT ADMISSION     Standing Status:   Standing     Number of Occurrences:   1     Order Specific Question:   Level of Care     Answer:   Med Surg [16]     Order Specific Question:   Estimated length of stay     Answer:   More than 2 Midnights     Order Specific Question:   Certification     Answer:   I certify that inpatient services are medically necessary for this patient for a duration of greater than two midnights  See H&P and MD Progress Notes for additional information about the patient's course of treatment  ED Arrival Information     Expected   -    Arrival   5/6/2023 07:05    Acuity   Urgent            Means of arrival   Walk-In    Escorted by   [facility]    Service   Hospitalist    Admission type   Emergency            Arrival complaint   SOB/legs swelling           Chief Complaint   Patient presents with   • Shortness of Breath     Worsening SOB and leg swelling X 3 weeks  Denies CP  Takes \"water pills\" every day  Initial Presentation: 69 y o  male from home to ED admitted inpatient due to acute CHF/atrial fib/type 2 DM with hyperglycemia  PMH of hpt, DM  Presented due to shortness of breath and leg swelling starting 3 weeks prior to arrival    On exam rhythm irregular  Lungs crackles  Bilateral LE edema -  TSH 5 135    INR 1 24 on Plavix  Bun 32  Creatinine 0 99  Glucose 220  CxR pulmonary vascular congestion  Bedside echo no pericardial effusion  Ecg atrial fib  In the ED given IV lasix  Poor historian, denies PMH of CHF, atrial fib  Plan is continued IV diuresis  Consult Cardiology  Start Eliquis  Continue home Coreg  Hold home HCTZ and lasix  Start SSI with accuchecks      Date: 5/7/23  Day 2:  Dyspnea on exertion     telemetry atrial fib " 3 to 4 second pauses  Net -2150  On exam bradycardia  Rhythm irregular  edema from his ankles to his mid thighs which extremely tense  mild sacral edema  Continue diuresis  Start Eliquis  5/7/23 per Cardiology - patient with acute CHF/atrial fib with controlled rate  Plan is IV diuresis with Lasix, increase Coreg to twice daily  Check echo  Start oral anticoagulation  ZIO as OP  BP improving and continue home HCTZ, Cozaar, Coreg increased and home Lasix on hold and on IV  Can dc Plavix       ED Triage Vitals   Temperature Pulse Respirations Blood Pressure SpO2   05/06/23 0733 05/06/23 0718 05/06/23 0718 05/06/23 0718 05/06/23 0718   97 5 °F (36 4 °C) 81 18 164/96 92 %      Temp Source Heart Rate Source Patient Position - Orthostatic VS BP Location FiO2 (%)   05/06/23 0733 05/06/23 0718 05/06/23 0904 05/06/23 0718 --   Oral Monitor Sitting Right arm       Pain Score       05/06/23 0718       No Pain          Wt Readings from Last 1 Encounters:   05/07/23 126 kg (278 lb 7 1 oz)     Additional Vital Signs:   05/07/23 09:56:07 -- 67 -- 150/108 Abnormal  122 91 % -- --   05/07/23 07:49:13 97 5 °F (36 4 °C) 63 18 142/107 Abnormal  119 95 % None (Room air) Sitting   05/06/23 21:30:16 97 8 °F (36 6 °C) 61 18 131/79 96 93 % -- --   05/06/23 15:48:52 97 5 °F (36 4 °C) 65 18 172/99 Abnormal  123 92 % -- --   05/06/23 10:16:54 98 °F (36 7 °C) 70 20 165/105 Abnormal  125 95 % None (Room air) Sitting   05/06/23 0904 -- 66 22 160/96 121 95 % None (Room air)      Date/Time Weight Weight Method Height   05/07/23 0546 126 kg (278 lb 7 1 oz) Standing scale --   05/06/23 1123 129 kg (283 lb 11 7 oz) Standing scale --   05/06/23 10:16:54 128 kg (282 lb 13 6 oz) Standing scale 6' (1 829 m)   05/06/23 0718 133 kg (292 lb 5 3 oz) Bed scale      Pertinent Labs/Diagnostic Test Results:   XR chest 1 view portable   Final Result by Naga Appiah MD (05/06 1022)      Marked enlargement of the cardiac silhouette, question cardiomegaly versus pericardial effusion  Question mild pulmonary venous congestion with trace right effusion  Workstation performed: [id number]           5/6/23 ecg - Atrial fibrillation  Left axis deviation  Inferior infarct , age undetermined  Anterolateral infarct , age undetermined  Abnormal ECG  No previous ECGs available    5/7/23 echo Left Ventricle: Left ventricular cavity size is normal when indexed for BSA  Wall thickness is mildly increased  There is mild concentric hypertrophy  The left ventricular ejection fraction is 50%  Systolic function is low normal  There is mild global hypokinesis  •  IVS: There is diastolic flattening of the interventricular septum consistent with right ventricle volume overload  •  Right Ventricle: Right ventricular cavity size is moderately dilated  Systolic function is moderately reduced  •  Left Atrium: The atrium is severely dilated (>48 mL/m2)  •  Right Atrium: The atrium is moderately dilated  •  Aortic Valve: There is mild regurgitation  •  Mitral Valve: There is mild annular calcification  There is moderate to severe regurgitation with a posteriorly directed jet  •  Tricuspid Valve: There is severe regurgitation  The right ventricular systolic pressure is severely elevated  The estimated right ventricular systolic pressure is 87 57 mmHg  •  Pericardium: There is a small pericardial effusion circumferential to the heart  The fluid exhibits no internal echoes  There is a left pleural effusion  Ascites is present  •  Pulmonary Artery: The pulmonary artery systolic pressure is severely increased   Notching of the RVOT Doppler waveform is noted    Results from last 7 days   Lab Units 05/06/23  0735   WBC Thousand/uL 7 07   HEMOGLOBIN g/dL 15 4   HEMATOCRIT % 49 1   PLATELETS Thousands/uL 190   NEUTROS ABS Thousands/µL 5 35     Results from last 7 days   Lab Units 05/07/23  0546 05/06/23  0735   SODIUM mmol/L 144 143   POTASSIUM mmol/L 5 0 4  1   CHLORIDE mmol/L 104 106   CO2 mmol/L 33* 30   ANION GAP mmol/L 7 7   BUN mg/dL 28* 32*   CREATININE mg/dL 0 92 0 99   EGFR ml/min/1 73sq m 80 73   CALCIUM mg/dL 8 9 9 1   MAGNESIUM mg/dL  --  1 9     Results from last 7 days   Lab Units 05/07/23  0546   AST U/L 36   ALT U/L 17   ALK PHOS U/L 163*   TOTAL PROTEIN g/dL 6 5   ALBUMIN g/dL 3 8   TOTAL BILIRUBIN mg/dL 1 89*     Results from last 7 days   Lab Units 05/07/23  0745 05/06/23  2127 05/06/23  1545 05/06/23  1135   POC GLUCOSE mg/dl 119 135 144* 126     Results from last 7 days   Lab Units 05/07/23  0546 05/06/23  0735   GLUCOSE RANDOM mg/dL 121 220*     Results from last 7 days   Lab Units 05/06/23  1443   HEMOGLOBIN A1C % 6 3*   EAG mg/dl 134     Results from last 7 days   Lab Units 05/06/23  1443 05/06/23  1124 05/06/23  0735   HS TNI 0HR ng/L  --   --  20   HS TNI 2HR ng/L  --  20  --    HSTNI D2 ng/L  --  0  --    HS TNI 4HR ng/L 22  --   --    HSTNI D4 ng/L 2  --   --      Results from last 7 days   Lab Units 05/06/23  0735   PROTIME seconds 15 8*   INR  1 24*   PTT seconds 32     Results from last 7 days   Lab Units 05/06/23  0735   TSH 3RD GENERATON uIU/mL 5 135*     Results from last 7 days   Lab Units 05/06/23  0735   BNP pg/mL 559*       ED Treatment:   Medication Administration from 05/06/2023 0704 to 05/06/2023 1011       Date/Time Order Dose Route Action Comments     05/06/2023 0904 EDT furosemide (LASIX) injection 40 mg 40 mg Intravenous Given --        Past Medical History:   Diagnosis Date   • Diabetes mellitus ([facility] )    • Hypertension      Present on Admission:  • Acute congestive heart failure (HCC)  • Type 2 diabetes mellitus with hyperglycemia, without long-term current use of insulin (HCC)  • Primary hypertension  • Atrial fibrillation (HCC)      Admitting Diagnosis: SOB (shortness of breath) [R06 02]  Acute exacerbation of CHF (congestive heart failure) (HCC) [I50 9]  Localized swelling of both lower legs [R22 43]  Age/Sex: 69 y o  male  Admission Orders:  05/06/23 0857 inpatient   Scheduled Medications:  apixaban, 5 mg, Oral, BID  aspirin, 81 mg, Oral, Daily  carvedilol, 6 25 mg, Oral, BID With Meals  cholecalciferol, 1,000 Units, Oral, Daily  clopidogrel, 75 mg, Oral, Daily  furosemide, 40 mg, Intravenous, BID (diuretic)  insulin lispro, 1-5 Units, Subcutaneous, HS  insulin lispro, 2-12 Units, Subcutaneous, TID AC  losartan, 100 mg, Oral, Daily  potassium chloride, 20 mEq, Oral, Daily  pravastatin, 80 mg, Oral, Daily With Dinner    carvedilol (COREG) tablet 25 mg  Dose: 25 mg  Freq: Daily with breakfast Route: PO  Start: 05/06/23 1200 End: 05/07/23 0950    Continuous IV Infusions: none      PRN Meds: none      Telemetry   Fluid restriction     IP CONSULT TO CARDIOLOGY    Network Utilization Review Department  ATTENTION: Please call with any questions or concerns to [phone] and carefully listen to the prompts so that you are directed to the right person  All voicemails are confidential   [facility] all requests for admission clinical reviews, approved or denied determinations and any other requests to dedicated fax number below belonging to the campus where the patient is receiving treatment   List of dedicated fax numbers for the Facilities:  [address] [place] [address] DENIALS (Administrative/Medical Necessity) [phone]   [address] (Maternity/NICU/Pediatrics) [phone] [address] [phone]   [facility] [phone]   [facility] [phone]   [facility] [address] [place]   [address] [place] [address] [phone]   [facility] [phone]

## 2023-05-07 NOTE — CONSULTS
Cardiology   Tori Joya 69 y o  male MRN: [id number]  Unit/Bed#: S -01 Encounter: [id number]      Reason for Consult / Principal Problem: Acute heart failure    Physician Requesting Consult:  Cooper Barraza MD    Cardiologist: Dr Roxanne Chaney    PCP:Dr Nader Chance    Assessment/Plan:    Acute congestive heart failure   IV Lasix 40 mg every 12 hours  Consider increasing his Coreg to 25 mg twice daily  Daily weights-weight on 5/6/2023 was 133 kg today's weight is 128kg and 126 kg  I/O 425 /2150 5/6  I&O 180/1970 5/7  Check 2D echocardiogram  Needs to be followed up in our office for heart failure  Atrial fibrillation with ventricular rate control-CHA2DS2-VASc =5 patient is having 3 to 4-second pauses on his telemetry  Consider ZIO as an outpatient  Check 2D echocardiogram    Start oral anticoagulation  Consider discontinue Plavix since patient denies any surgical procedures or vascular procedures  Diabetes type 2 defer to primary service patient is on metformin at home but is currently on hold due to the active diuresis continues insulin sliding scale  Hypertension -BP slightly improving       HPI: Tori Joya 69y o  year old male's [place] speaking male who admits to several month history of shortness of breath, weight gain and bilateral lower leg edema  I was unable to utilize our translating system due to the lack of [place]  [facility]  was utilized to communicate with the patient  Patient states that he has been sleeping in a recliner because he is unable to lie down flat due to shortness of breath  His legs have gradually gotten more swollen  He has also noticed that his pants have gotten tighter around his waist   He denies any chest pain,syncope or palpitations  Patient denies knowing history of atrial fibrillation  Patient did state that he 10 years ago had fluid tapped off his lungs    Patient denies any vascular surgery a cardiac surgery that would require the use of Plavix  which the patient is currently on  Since it is admission the patient states his breathing has improved significantly but still cannot lie down and sleep in the bed  Patient does take Coreg 25 mg daily at home and hydrochlorothiazide 25 mg daily and Cozaar 100 mg daily and Lasix 20 mg daily  He states he follows with Dr Allred  Patient has never had a stress test or 2D echocardiogram         Family History: History reviewed  No pertinent family history  Historical Information   Past Medical History:   Diagnosis Date   • Diabetes mellitus ([facility] [address] )    • Hypertension      Past Surgical History:   Procedure Laterality Date   • BACK SURGERY       Social History   Social History     Substance and Sexual Activity   Alcohol Use None     Social History     Substance and Sexual Activity   Drug Use Not on file     Social History     Tobacco Use   Smoking Status Never   Smokeless Tobacco Never     Family History: History reviewed  No pertinent family history  Review of Systems:  Review of Systems   Constitutional: Positive for fatigue  Respiratory: Positive for shortness of breath  Cardiovascular: Positive for leg swelling  Gastrointestinal: Negative  Endocrine: Negative  Genitourinary: Negative  Musculoskeletal: Negative  Skin: Negative  Allergic/Immunologic: Negative  Neurological: Negative  Hematological: Negative  Psychiatric/Behavioral: Negative              Scheduled Meds:  Current Facility-Administered Medications   Medication Dose Route Frequency Provider Last Rate   • apixaban  5 mg Oral BID Narcisa Maxwell MD     • aspirin  81 mg Oral Daily Narcisa Maxwell MD     • carvedilol  25 mg Oral Daily With Breakfast Narcisa Maxwell MD     • cholecalciferol  1,000 Units Oral Daily Cheko Hines MD     • clopidogrel  75 mg Oral Daily Narcisa Maxwell MD     • furosemide  40 mg Intravenous BID (diuretic) Cheko Hines MD • insulin lispro  1-5 Units Subcutaneous HS Narcisa Maxwell MD     • insulin lispro  2-12 Units Subcutaneous TID AC Narcisa Maxwell MD     • losartan  100 mg Oral Daily Narcisa Maxwell MD     • potassium chloride  20 mEq Oral Daily Narcisa Maxwell MD     • pravastatin  80 mg Oral Daily With Mickie Fitzgerald MD       Continuous Infusions:   PRN Meds:  PTA meds:   Prior to Admission Medications   Prescriptions Last Dose Informant Patient Reported? Taking? Ergocalciferol (VITAMIN D2 PO) 5/5/2023  Yes Yes   Sig: Take 1 25 mg by mouth   aspirin (ECOTRIN LOW STRENGTH) 81 mg EC tablet 5/5/2023  Yes Yes   Sig: Take 81 mg by mouth daily   aspirin 325 mg tablet 5/5/2023  Yes Yes   Sig: Take 325 mg by mouth daily   carvedilol (COREG) 25 mg tablet 5/5/2023  Yes Yes   Sig: Take 25 mg by mouth daily   clopidogrel (PLAVIX) 75 mg tablet 5/5/2023  Yes Yes   Sig: Take 75 mg by mouth daily   furosemide (LASIX) 20 mg tablet 5/5/2023  Yes Yes   Sig: Take 20 mg by mouth daily   hydrochlorothiazide (HYDRODIURIL) 25 mg tablet 5/5/2023  Yes Yes   Sig: Take 25 mg by mouth daily   losartan (COZAAR) 100 MG tablet 5/5/2023  Yes Yes   Sig: Take 100 mg by mouth daily   metFORMIN (GLUCOPHAGE) 1000 MG tablet 5/5/2023  Yes Yes   Sig: Take 2,000 mg by mouth 2 (two) times a day with meals Pt reports that he should be taking 1,000mg BID but takes total 2,000mg once a day   rosuvastatin (CRESTOR) 10 MG tablet 5/5/2023  Yes Yes   Sig: Take 10 mg by mouth daily      Facility-Administered Medications: None       Allergies   Allergen Reactions   • Penicillins Rash       Objective   Vitals: Blood pressure (!) 142/107, pulse 63, temperature 97 5 °F (36 4 °C), temperature source Oral, resp  rate 18, height 6' (1 829 m), weight 126 kg (278 lb 7 1 oz), SpO2 95 %  , Body mass index is 37 76 kg/m² ,   Orthostatic Blood Pressures    Flowsheet Row Most Recent Value   Blood Pressure 142/107 filed at 05/07/2023 0749   Patient "Position - Orthostatic VS Sitting filed at 05/07/2023 0749            Intake/Output Summary (Last 24 hours) at 5/7/2023 0816  Last data filed at 5/6/2023 2046  Gross per 24 hour   Intake --   Output 2150 ml   Net -2150 ml       Invasive Devices     Peripheral Intravenous Line  Duration           Peripheral IV 05/06/23 Right;Ventral (anterior) Forearm 1 day                  Physical Exam  Vitals reviewed  Constitutional:       Appearance: Normal appearance  HENT:      Head: Normocephalic and atraumatic  Mouth/Throat:      Mouth: Mucous membranes are moist    Cardiovascular:      Rate and Rhythm: Bradycardia present  Rhythm irregular  Pulmonary:      Effort: Pulmonary effort is normal       Breath sounds: Normal breath sounds  Abdominal:      General: Abdomen is flat  Palpations: Abdomen is soft  Musculoskeletal:      Right lower leg: Edema present  Left lower leg: Edema present  Comments: Patient has edema from his ankles to his mid thighs which extremely tense  He does have mild sacral edema   Skin:     General: Skin is warm and dry  Capillary Refill: Capillary refill takes more than 3 seconds  Neurological:      Mental Status: He is alert and oriented to person, place, and time     Psychiatric:         Behavior: Behavior normal          Lab Results:   Recent Results (from the past 24 hour(s))   HS Troponin I 2hr    Collection Time: 05/06/23 11:24 AM   Result Value Ref Range    hs TnI 2hr 20 \"Refer to ACS Flowchart\"- see link ng/L    Delta 2hr hsTnI 0 <20 ng/L   Fingerstick Glucose (POCT)    Collection Time: 05/06/23 11:35 AM   Result Value Ref Range    POC Glucose 126 65 - 140 mg/dl   HS Troponin I 4hr    Collection Time: 05/06/23  2:43 PM   Result Value Ref Range    hs TnI 4hr 22 \"Refer to ACS Flowchart\"- see link ng/L    Delta 4hr hsTnI 2 <20 ng/L   Fingerstick Glucose (POCT)    Collection Time: 05/06/23  3:45 PM   Result Value Ref Range    POC Glucose 144 (H) 65 - 140 mg/dl " Fingerstick Glucose (POCT)    Collection Time: 05/06/23  9:27 PM   Result Value Ref Range    POC Glucose 135 65 - 140 mg/dl   Comprehensive metabolic panel    Collection Time: 05/07/23  5:46 AM   Result Value Ref Range    Sodium 144 135 - 147 mmol/L    Potassium 5 0 3 5 - 5 3 mmol/L    Chloride 104 96 - 108 mmol/L    CO2 33 (H) 21 - 32 mmol/L    ANION GAP 7 4 - 13 mmol/L    BUN 28 (H) 5 - 25 mg/dL    Creatinine 0 92 0 60 - 1 30 mg/dL    Glucose 121 65 - 140 mg/dL    Calcium 8 9 8 4 - 10 2 mg/dL    AST 36 13 - 39 U/L    ALT 17 7 - 52 U/L    Alkaline Phosphatase 163 (H) 34 - 104 U/L    Total Protein 6 5 6 4 - 8 4 g/dL    Albumin 3 8 3 5 - 5 0 g/dL    Total Bilirubin 1 89 (H) 0 20 - 1 00 mg/dL    eGFR 80 ml/min/1 73sq m   Fingerstick Glucose (POCT)    Collection Time: 05/07/23  7:45 AM   Result Value Ref Range    POC Glucose 119 65 - 140 mg/dl   ]    Imaging: I have personally reviewed pertinent reports  EKG: Atrial fibrillation with a ventricular rate control  CHEST X-RAY: Mild mild pulmonary congestion with trace of right pleural effusion  ECHO:pending    Code Status: Level 1 full code    [facility] Everywhere [facility] reviewed:     * Please Note: Fluency DirectDictation voice to text software may have been used in the creation of this document   **

## 2023-05-08 LAB
ANION GAP SERPL CALCULATED.3IONS-SCNC: 9 MMOL/L (ref 4–13)
BUN SERPL-MCNC: 28 MG/DL (ref 5–25)
CALCIUM SERPL-MCNC: 9.5 MG/DL (ref 8.4–10.2)
CHLORIDE SERPL-SCNC: 95 MMOL/L (ref 96–108)
CO2 SERPL-SCNC: 38 MMOL/L (ref 21–32)
CREAT SERPL-MCNC: 0.98 MG/DL (ref 0.6–1.3)
GFR SERPL CREATININE-BSD FRML MDRD: 74 ML/MIN/1.73SQ M
GLUCOSE SERPL-MCNC: 107 MG/DL (ref 65–140)
GLUCOSE SERPL-MCNC: 126 MG/DL (ref 65–140)
GLUCOSE SERPL-MCNC: 136 MG/DL (ref 65–140)
GLUCOSE SERPL-MCNC: 140 MG/DL (ref 65–140)
GLUCOSE SERPL-MCNC: 147 MG/DL (ref 65–140)
POTASSIUM SERPL-SCNC: 3.9 MMOL/L (ref 3.5–5.3)
SODIUM SERPL-SCNC: 142 MMOL/L (ref 135–147)

## 2023-05-08 RX ORDER — FUROSEMIDE 10 MG/ML
40 INJECTION INTRAMUSCULAR; INTRAVENOUS
Status: DISCONTINUED | OUTPATIENT
Start: 2023-05-08 | End: 2023-05-09 | Stop reason: HOSPADM

## 2023-05-08 RX ADMIN — APIXABAN 5 MG: 5 TABLET, FILM COATED ORAL at 17:19

## 2023-05-08 RX ADMIN — FUROSEMIDE 40 MG: 10 INJECTION, SOLUTION INTRAMUSCULAR; INTRAVENOUS at 08:02

## 2023-05-08 RX ADMIN — Medication 1000 UNITS: at 08:02

## 2023-05-08 RX ADMIN — FUROSEMIDE 40 MG: 10 INJECTION, SOLUTION INTRAMUSCULAR; INTRAVENOUS at 17:31

## 2023-05-08 RX ADMIN — LOSARTAN POTASSIUM 100 MG: 50 TABLET, FILM COATED ORAL at 08:02

## 2023-05-08 RX ADMIN — POTASSIUM CHLORIDE 20 MEQ: 1500 TABLET, EXTENDED RELEASE ORAL at 08:02

## 2023-05-08 RX ADMIN — ASPIRIN 81 MG: 81 TABLET, COATED ORAL at 08:02

## 2023-05-08 RX ADMIN — FUROSEMIDE 40 MG: 10 INJECTION, SOLUTION INTRAMUSCULAR; INTRAVENOUS at 13:48

## 2023-05-08 RX ADMIN — CARVEDILOL 6.25 MG: 6.25 TABLET, FILM COATED ORAL at 17:20

## 2023-05-08 RX ADMIN — CARVEDILOL 6.25 MG: 6.25 TABLET, FILM COATED ORAL at 08:02

## 2023-05-08 RX ADMIN — PRAVASTATIN SODIUM 80 MG: 80 TABLET ORAL at 17:20

## 2023-05-08 RX ADMIN — APIXABAN 5 MG: 5 TABLET, FILM COATED ORAL at 08:02

## 2023-05-08 NOTE — ASSESSMENT & PLAN NOTE
· POA, patient denied any prior history of atrial fibrillation   · Home medications include Coreg, but dosing was 25 mg once daily and should be changed to twice daily  · EKG on admission showed atrial fibrillation with controlled rate, however he was noted on tele to have slow ventricular response with bradycardia/pauses  Adjusted beta-blocker accordingly, currently on 6 25 BID  · Was not on any anticoagulation prior to admission, unknown why he was on Plavix but recommend discontinuing at this time  · [id number] score is 5-6  · He was started on anticoagulation with Eliquis  · Monitor on telemetry due to NSVT    Cardiology consult appreciated

## 2023-05-08 NOTE — PROGRESS NOTES
Progress Note - Cardiology Team 1  Jey Kim 69 y o  male MRN: [id number]  Unit/Bed#: S -01 Encounter: [id number]        Principal Problem:    Acute congestive heart failure, with mildly reduced EF and pulmonary hypertension  Active Problems:    Type 2 diabetes mellitus with hyperglycemia, without long-term current use of insulin (HCC)    Primary hypertension    Atrial fibrillation (HCC)    Class 2 obesity in adult    Pulmonary hypertension (HCC)    Mitral regurgitation, moderate to severe      Assessment/Plan    1  Acute diastolic heart failure and right sided HF  ? Chronic - PTA- lasix 20mg oral daily as well as 25mg HCTZ  Not previously seeing cardiology    CXR-volume overload with cardiomegaly  24 hour I/O incomplete, pt non compliant  Weight 264 ( 278)  ? 14 lbs 1 day  Currently on IV lasix 40mg BID  SLIM Increased to TID  Reinforced need to measure output with translation  2 g sodium restriction, 1500 cc fluid restriction- needs dietary instruction  2   Severe pulmonary hypertension  No prior echo on my review  Etiology undetermined, suspect Group 2/3 ? other  ? Underlying lung disease  Suspected RUPERTO    3  Atrial fibrillation-with bradycardia  POA  Carvedilol decreased from 25 mg daily to 6 25 mg twice a day  HR 50-60's   Eliquis has been initiated d/t elevated stroke risk     4  Moderate to severe MR with severe TR  Will need outpatient f/u   Continue losartan 100mg daily and diuresis    5  Suspected RUPERTO- recommend outpatient sleep study    6  Hypertension- hypertensive  PTA-carvedilol 25 mg daily, Lasix 20 mg daily, HCTZ 25 mg daily, losartan 100 mg daily  Currently on carvedilol 6 25 mg twice daily, IV Lasix 40 mg twice daily, losartan 100 mg daily  We will follow along  May need further adjustment after diuresis  7    HLD-currently on pravastatin 80 mg daily  Chol 104/trig 64/HDL 44/LDL 47  PTA- rosuvastatin 10mg daily      Subjective/Objective   Chief Complaint/Subjective  Events overnight  Via translation patient was encouraged to save all his urine for measurement  Denies chest pain  Reports significant improvement of dyspnea  Reports  resolved orthopnea  Reports ongoing lower extremity edema but significantly improved        Vitals: /85   Pulse 64   Temp (!) 97 4 °F (36 3 °C)   Resp 18   Ht 6' (1 829 m)   Wt 120 kg (264 lb 5 3 oz)   SpO2 94%   BMI 35 85 kg/m²     Vitals:    05/07/23 1340 05/08/23 0538   Weight: 126 kg (277 lb 12 5 oz) 120 kg (264 lb 5 3 oz)     Orthostatic Blood Pressures    Flowsheet Row Most Recent Value   Blood Pressure 163/85 filed at 05/08/2023 8317   Patient Position - Orthostatic VS Lying filed at 05/08/2023 0700            Intake/Output Summary (Last 24 hours) at 5/8/2023 1144  Last data filed at 5/8/2023 0900  Gross per 24 hour   Intake 180 ml   Output 550 ml   Net -370 ml       Invasive Devices     Peripheral Intravenous Line  Duration           Peripheral IV 05/06/23 Right;Ventral (anterior) Forearm 2 days                Current Facility-Administered Medications   Medication Dose Route Frequency   • apixaban (ELIQUIS) tablet 5 mg  5 mg Oral BID   • aspirin (ECOTRIN LOW STRENGTH) EC tablet 81 mg  81 mg Oral Daily   • carvedilol (COREG) tablet 6 25 mg  6 25 mg Oral BID With Meals   • cholecalciferol (VITAMIN D3) tablet 1,000 Units  1,000 Units Oral Daily   • furosemide (LASIX) injection 40 mg  40 mg Intravenous TID (diuretic)   • insulin lispro (HumaLOG) 100 units/mL subcutaneous injection 1-5 Units  1-5 Units Subcutaneous HS   • insulin lispro (HumaLOG) 100 units/mL subcutaneous injection 2-12 Units  2-12 Units Subcutaneous TID AC   • losartan (COZAAR) tablet 100 mg  100 mg Oral Daily   • potassium chloride (K-DUR,KLOR-CON) CR tablet 20 mEq  20 mEq Oral Daily   • pravastatin (PRAVACHOL) tablet 80 mg  80 mg Oral Daily With Dinner         Physical Exam: /85   Pulse 64   Temp (!) 97 4 °F (36 3 °C)   Resp 18 Ht 6' (1 829 m)   Wt 120 kg (264 lb 5 3 oz)   SpO2 94%   BMI 35 85 kg/m²     General Appearance:    Alert, cooperative, no distress, appears stated age   Head:    Normocephalic, no scleral icterus   Eyes:    PERRL   Nose:   Nares normal, septum midline, no drainage    Throat:   Lips, mucosa, and tongue normal   Neck:   Supple, symmetrical, trachea midline,         Back:     Symmetric, no CVA tenderness   Lungs:     Clear to auscultation bilaterally, respirations unlabored   Chest Wall:    No tenderness or deformity    Heart:    Regular rate and rhythm, S1 and S2 normal, no murmur, rub   or gallop   Abdomen:     Soft, non-tender, bowel sounds active all four quadrants,     no masses, no organomegaly   Extremities:   Extremities normal, atraumatic, ++ edema       Skin:   Skin color, texture, turgor normal, no rashes or lesions   Neurologic:   Alert and oriented to person place and time, no focal deficits                 Lab Results:   Recent Results (from the past 72 hour(s))   ECG 12 lead    Collection Time: 05/06/23  7:24 AM   Result Value Ref Range    Ventricular Rate 76 BPM    Atrial Rate 174 BPM    OH Interval  ms    QRSD Interval 108 ms    QT Interval 420 ms    QTC Interval 472 ms    P Axis  degrees    QRS Axis -67 degrees    T Wave Axis 98 degrees   CBC and differential    Collection Time: 05/06/23  7:35 AM   Result Value Ref Range    WBC 7 07 4 31 - 10 16 Thousand/uL    RBC 4 86 3 88 - 5 62 Million/uL    Hemoglobin 15 4 12 0 - 17 0 g/dL    Hematocrit 49 1 36 5 - 49 3 %     (H) 82 - 98 fL    MCH 31 7 26 8 - 34 3 pg    MCHC 31 4 31 4 - 37 4 g/dL    RDW 16 5 (H) 11 6 - 15 1 %    MPV 9 9 8 9 - 12 7 fL    Platelets 867 373 - 162 Thousands/uL    nRBC 0 /100 WBCs    Neutrophils Relative 76 (H) 43 - 75 %    Immat GRANS % 1 0 - 2 %    Lymphocytes Relative 13 (L) 14 - 44 %    Monocytes Relative 9 4 - 12 %    Eosinophils Relative 1 0 - 6 %    Basophils Relative 0 0 - 1 %    Neutrophils Absolute 5 35 1 85 - 7 62 "Thousands/µL    Immature Grans Absolute 0 04 0 00 - 0 20 Thousand/uL    Lymphocytes Absolute 0 92 0 60 - 4 47 Thousands/µL    Monocytes Absolute 0 65 0 17 - 1 22 Thousand/µL    Eosinophils Absolute 0 08 0 00 - 0 61 Thousand/µL    Basophils Absolute 0 03 0 00 - 0 10 Thousands/µL   Basic metabolic panel    Collection Time: 05/06/23  7:35 AM   Result Value Ref Range    Sodium 143 135 - 147 mmol/L    Potassium 4 1 3 5 - 5 3 mmol/L    Chloride 106 96 - 108 mmol/L    CO2 30 21 - 32 mmol/L    ANION GAP 7 4 - 13 mmol/L    BUN 32 (H) 5 - 25 mg/dL    Creatinine 0 99 0 60 - 1 30 mg/dL    Glucose 220 (H) 65 - 140 mg/dL    Calcium 9 1 8 4 - 10 2 mg/dL    eGFR 73 ml/min/1 73sq m   Magnesium    Collection Time: 05/06/23  7:35 AM   Result Value Ref Range    Magnesium 1 9 1 9 - 2 7 mg/dL   HS Troponin 0hr (reflex protocol)    Collection Time: 05/06/23  7:35 AM   Result Value Ref Range    hs TnI 0hr 20 \"Refer to ACS Flowchart\"- see link ng/L   B-Type Natriuretic Peptide(BNP)    Collection Time: 05/06/23  7:35 AM   Result Value Ref Range     (H) 0 - 100 pg/mL   Protime-INR    Collection Time: 05/06/23  7:35 AM   Result Value Ref Range    Protime 15 8 (H) 11 6 - 14 5 seconds    INR 1 24 (H) 0 84 - 1 19   APTT    Collection Time: 05/06/23  7:35 AM   Result Value Ref Range    PTT 32 23 - 37 seconds   TSH, 3rd generation    Collection Time: 05/06/23  7:35 AM   Result Value Ref Range    TSH 3RD GENERATON 5 135 (H) 0 450 - 4 500 uIU/mL   Lipid Panel with Direct LDL reflex    Collection Time: 05/06/23  7:35 AM   Result Value Ref Range    Cholesterol 104 See Comment mg/dL    Triglycerides 64 See Comment mg/dL    HDL, Direct 44 >=40 mg/dL    LDL Calculated 47 0 - 100 mg/dL   HS Troponin I 2hr    Collection Time: 05/06/23 11:24 AM   Result Value Ref Range    hs TnI 2hr 20 \"Refer to ACS Flowchart\"- see link ng/L    Delta 2hr hsTnI 0 <20 ng/L   Fingerstick Glucose (POCT)    Collection Time: 05/06/23 11:35 AM   Result Value Ref Range    POC " "Glucose 126 65 - 140 mg/dl   HS Troponin I 4hr    Collection Time: 05/06/23  2:43 PM   Result Value Ref Range    hs TnI 4hr 22 \"Refer to ACS Flowchart\"- see link ng/L    Delta 4hr hsTnI 2 <20 ng/L   Hemoglobin A1c w/EAG Estimation (Orders if not completed within the last 90 days)    Collection Time: 05/06/23  2:43 PM   Result Value Ref Range    Hemoglobin A1C 6 3 (H) Normal 3 8-5 6%; PreDiabetic 5 7-6 4%;  Diabetic >=6 5%; Glycemic control for adults with diabetes <7 0% %     mg/dl   Fingerstick Glucose (POCT)    Collection Time: 05/06/23  3:45 PM   Result Value Ref Range    POC Glucose 144 (H) 65 - 140 mg/dl   Fingerstick Glucose (POCT)    Collection Time: 05/06/23  9:27 PM   Result Value Ref Range    POC Glucose 135 65 - 140 mg/dl   Comprehensive metabolic panel    Collection Time: 05/07/23  5:46 AM   Result Value Ref Range    Sodium 144 135 - 147 mmol/L    Potassium 5 0 3 5 - 5 3 mmol/L    Chloride 104 96 - 108 mmol/L    CO2 33 (H) 21 - 32 mmol/L    ANION GAP 7 4 - 13 mmol/L    BUN 28 (H) 5 - 25 mg/dL    Creatinine 0 92 0 60 - 1 30 mg/dL    Glucose 121 65 - 140 mg/dL    Calcium 8 9 8 4 - 10 2 mg/dL    AST 36 13 - 39 U/L    ALT 17 7 - 52 U/L    Alkaline Phosphatase 163 (H) 34 - 104 U/L    Total Protein 6 5 6 4 - 8 4 g/dL    Albumin 3 8 3 5 - 5 0 g/dL    Total Bilirubin 1 89 (H) 0 20 - 1 00 mg/dL    eGFR 80 ml/min/1 73sq m   Fingerstick Glucose (POCT)    Collection Time: 05/07/23  7:45 AM   Result Value Ref Range    POC Glucose 119 65 - 140 mg/dl   Fingerstick Glucose (POCT)    Collection Time: 05/07/23 10:58 AM   Result Value Ref Range    POC Glucose 128 65 - 140 mg/dl   Echo complete    Collection Time: 05/07/23  1:46 PM   Result Value Ref Range    LA size 7 2 cm    Triscuspid Valve Regurgitation Peak Gradient 58 0 mmHg    Tricuspid valve peak regurgitation velocity 3 81 m/s    LVPWd 1 20 cm    Left Atrium Area-systolic Apical Two Chamber 55 3 cm2    Left Atrium Area-systolic Four Chamber 92 1 cm2    " Tricuspid annular plane systolic excursion 7 76 cm    TR Peak Keith 3 8 m/s    IVSd 7 35 cm    LV DIASTOLIC VOLUME (MOD BIPLANE) 2D 183 mL    LEFT VENTRICLE SYSTOLIC VOLUME (MOD BIPLANE) 2D 86 mL    Left ventricular stroke volume (2D) 97 00 mL    EF 51 %    A4C EF 46 %    LA length (A2C) 9 40 cm    LVIDd 6 00 cm    IVS 1 2 cm    LVIDS 4 40 cm    FS 27 28 - 44 %    Asc Ao 3 6 cm    Ao root 3 80 cm    RVID d 5 0 cm    LVOT mn grad 2 0 mmHg    AV LVOT peak gradient 3 mmHg    LVOT peak keith 0 87 m/s    LVOT peak VTI 11 84 cm    LV Systolic Volume (BP) 82 mL    LV Diastolic Volume (BP) 064 mL    GEMINI A4C 51 cm2    RAA A4C 39 5 cm2    LVSV, 2D 97 mL    LV EF 50     Est  RA pres 12 0 mmHg    Right Ventricular Peak Systolic Pressure 94 38 mmHg   Fingerstick Glucose (POCT)    Collection Time: 05/07/23  4:09 PM   Result Value Ref Range    POC Glucose 127 65 - 140 mg/dl   Fingerstick Glucose (POCT)    Collection Time: 05/07/23  8:58 PM   Result Value Ref Range    POC Glucose 132 65 - 140 mg/dl   Fingerstick Glucose (POCT)    Collection Time: 05/08/23  7:24 AM   Result Value Ref Range    POC Glucose 107 65 - 140 mg/dl   Basic metabolic panel    Collection Time: 05/08/23  8:37 AM   Result Value Ref Range    Sodium 142 135 - 147 mmol/L    Potassium 3 9 3 5 - 5 3 mmol/L    Chloride 95 (L) 96 - 108 mmol/L    CO2 38 (H) 21 - 32 mmol/L    ANION GAP 9 4 - 13 mmol/L    BUN 28 (H) 5 - 25 mg/dL    Creatinine 0 98 0 60 - 1 30 mg/dL    Glucose 147 (H) 65 - 140 mg/dL    Calcium 9 5 8 4 - 10 2 mg/dL    eGFR 74 ml/min/1 73sq m   Fingerstick Glucose (POCT)    Collection Time: 05/08/23 11:05 AM   Result Value Ref Range    POC Glucose 140 65 - 140 mg/dl     Imaging: I have personally reviewed pertinent reports  Tele- afib    Left Ventricle Left ventricular cavity size is normal when indexed for BSA  Wall thickness is mildly increased  There is mild concentric hypertrophy  The left ventricular ejection fraction is 50%   Systolic function is low normal   There is mild global hypokinesis  Unable to assess diastolic function due to atrial fibrillation  Interventricular Septum There is diastolic flattening of the interventricular septum consistent with right ventricle volume overload  Right Ventricle Right ventricular cavity size is moderately dilated  Systolic function is moderately reduced  Wall thickness is normal    Left Atrium The atrium is severely dilated (>48 mL/m2)  Right Atrium The atrium is moderately dilated  Aortic Valve The aortic valve is trileaflet  The leaflets are not thickened  The leaflets are not calcified  The leaflets exhibit normal mobility  There is mild regurgitation  The aortic valve has no significant stenosis  Mitral Valve There is mild annular calcification  There is moderate to severe regurgitation with a posteriorly directed jet  There is no evidence of stenosis  Tricuspid Valve Tricuspid valve structure is normal  There is severe regurgitation  There is no evidence of stenosis  The right ventricular systolic pressure is severely elevated  The estimated right ventricular systolic pressure is 53 57 mmHg  Pulmonic Valve Pulmonic valve structure is normal  There is no evidence of regurgitation  There is no evidence of stenosis  Ascending Aorta The aortic root is normal in size  IVC/SVC The right atrial pressure is estimated at 12 0 mmHg  The inferior vena cava is dilated  Respirophasic changes were blunted (less than 50% variation)  Pericardium There is a small pericardial effusion circumferential to the heart  The fluid exhibits no internal echoes  The pericardium is normal in appearance  There is a left pleural effusion  Ascites is present  Pulmonary Artery The pulmonary artery systolic pressure is severely increased  Notching of the RVOT Doppler waveform is noted  Counseling / Coordination of Care  Total time spent today 30 minutes   Greater than 50% of total time was spent with the patient and / or family counseling and / or coordination of care

## 2023-05-08 NOTE — PROGRESS NOTES
"[facility]  Progress Note  Name: Levar Gaston  MRN: [id number]  Unit/Bed#: S -01 I Date of Admission: 5/6/2023   Date of Service: 5/8/2023 I Hospital Day: 2    Assessment/Plan   * Acute congestive heart failure, with mildly reduced EF and pulmonary hypertension  Assessment & Plan  · Presents with progressively worsening shortness of breath/orthopnea, significant weight gain, abdominal distention, and severe bilateral lower extremity swelling  · Chest x-ray: marked cardiomegaly and vascular congestion  · Today increase to Lasix 40 mg IV TID, pre-hospital was on lasix 20 daily and HCTZ 25 daily  · Monitor weight/output; -2 5L cumulative; -13kg  · 2d echocardiogram \"EF 50%  Mild hypokinesis  Severe tricuspid regurgitation  The right ventricular systolic pressure is severely elevated  The estimated right ventricular systolic pressure is 41 01 mmHg  There is a small pericardial effusion circumferential to the heart  There is a left pleural effusion  Ascites is present  Pulmonary Artery: The pulmonary artery systolic pressure is severely increased  Notching of the RVOT\"  · Defer to [facility] ? [facility]    Atrial fibrillation ([facility] [address] )  Assessment & Plan  · POA, patient denied any prior history of atrial fibrillation   · Home medications include Coreg, but dosing was 25 mg once daily and should be changed to twice daily  · EKG on admission showed atrial fibrillation with controlled rate, however he was noted on tele to have slow ventricular response with bradycardia/pauses  Adjusted beta-blocker accordingly, currently on 6 25 BID  · Was not on any anticoagulation prior to admission, unknown why he was on Plavix but recommend discontinuing at this time  · [id number] score is 5-6  · He was started on anticoagulation with Eliquis  · Monitor on telemetry due to NSVT  Cardiology consult appreciated    Class 2 obesity in adult  Assessment & Plan  · bmi 36   Recommend weight loss    Primary " hypertension  Assessment & Plan  · Blood pressure remains modestly elevated   · Home medications include Coreg 25 mg daily, HCTZ 25 mg daily, Cozaar 100 mg daily, Lasix 20 mg daily  · Adjustment in medications to reflect for bradycardia, and need for additional diuresis    Mitral regurgitation, moderate to severe  Assessment & Plan  · noted on echo to have mod to severe MR  · Outpatient follow up will be required  · Cardiology following    Pulmonary hypertension ([address] )  Assessment & Plan  · Echocardiogram shows severe tricuspid regurgitation  The right ventricular systolic pressure is severely elevated at 70 00 mmHg  · Requires outpatient sleep study to assess for sleep apnea  In the meantime will do overnight pulse ox and am ABG  · Defer to [facility]--eventual [facility]? Type 2 diabetes mellitus with hyperglycemia, without long-term current use of insulin [facility])  Assessment & Plan  Lab Results   Component Value Date    HGBA1C 6 3 (H) 05/06/2023     Recent Labs     05/07/23  1058 05/07/23  1609 05/07/23  2058 05/08/23  0724   POCGLU 128 127 132 107     Blood Sugar Average: Last 72 hrs:  (P) 127 25  · On metformin 2000 mg daily  BS stable here  · Accu-Cheks ACHS, hold metformin, SSI coverage  · Monitor closely       VTE Pharmacologic Prophylaxis: VTE Score: 4 eliquis    Patient Centered Rounds: spoke with RN  Discussions with Specialists or Other Care Team Provider: spoke with case mgmt and cardiology    Education and Discussions with Family / Patient: Updated  (friend) via phone  [place]    Total Time Spent on Date of Encounter in care of patient: 30 min This time was spent on one or more of the following: performing physical exam; counseling and coordination of care; obtaining or reviewing history; documenting in the medical record; reviewing/ordering tests, medications or procedures; communicating with other healthcare professionals and discussing with patient's family/caregivers      Current Length of Stay: 2 day(s)  Current Patient Status: Inpatient   Certification Statement: The patient will continue to require additional inpatient hospital stay due to IV diuretics  Discharge Plan: Anticipate discharge in 24-48 hrs to home  Code Status: Level 1 - Full Code    Subjective:   Friend provided translation  He is feeling better, able to lay down now  Still a lot of fluid in his legs even into his thighs and abdominal fluid  Objective:     Vitals:   Temp (24hrs), Av 7 °F (36 5 °C), Min:97 4 °F (36 3 °C), Max:97 8 °F (36 6 °C)    Temp:  [97 4 °F (36 3 °C)-97 8 °F (36 6 °C)] 97 4 °F (36 3 °C)  HR:  [63-71] 64  Resp:  [18] 18  BP: (125-170)/() 163/85  SpO2:  [90 %-95 %] 94 %  Body mass index is 35 85 kg/m²  Input and Output Summary (last 24 hours): Intake/Output Summary (Last 24 hours) at 2023 1250  Last data filed at 2023 0900  Gross per 24 hour   Intake 180 ml   Output 550 ml   Net -370 ml       Physical Exam:   Physical Exam  Vitals reviewed  Constitutional:       General: He is not in acute distress  Appearance: He is obese  He is not ill-appearing, toxic-appearing or diaphoretic  Comments: Laying more flat now  nontoxic   Eyes:      General: No scleral icterus  Right eye: No discharge  Left eye: No discharge  Conjunctiva/sclera: Conjunctivae normal    Cardiovascular:      Rate and Rhythm: Normal rate  Rhythm irregular  Heart sounds: No murmur heard  Pulmonary:      Effort: No respiratory distress  Breath sounds: No stridor  No wheezing or rhonchi  Comments: After animal exertion with repositioning he did briefly appear to be slightly dyspneic  Abdominal:      General: There is no distension  Palpations: Abdomen is soft  Tenderness: There is no abdominal tenderness  There is no guarding  Comments: Obese abdomen with ascites   Musculoskeletal:      Right lower leg: Edema present  Left lower leg: Edema present        Comments: Still with severe bilateral lower extremity pitting edema extending all the way up into his thighs   Skin:     General: Skin is warm and dry  Coloration: Skin is not jaundiced or pale  Findings: No bruising, erythema, lesion or rash  Neurological:      General: No focal deficit present  Mental Status: He is alert  Mental status is at baseline        Comments: No confusion          Additional Data:     Labs:  Results from last 7 days   Lab Units 05/06/23  0735   WBC Thousand/uL 7 07   HEMOGLOBIN g/dL 15 4   HEMATOCRIT % 49 1   PLATELETS Thousands/uL 190   NEUTROS PCT % 76*   LYMPHS PCT % 13*   MONOS PCT % 9   EOS PCT % 1     Results from last 7 days   Lab Units 05/08/23  0837 05/07/23  0546   SODIUM mmol/L 142 144   POTASSIUM mmol/L 3 9 5 0   CHLORIDE mmol/L 95* 104   CO2 mmol/L 38* 33*   BUN mg/dL 28* 28*   CREATININE mg/dL 0 98 0 92   ANION GAP mmol/L 9 7   CALCIUM mg/dL 9 5 8 9   ALBUMIN g/dL  --  3 8   TOTAL BILIRUBIN mg/dL  --  1 89*   ALK PHOS U/L  --  163*   ALT U/L  --  17   AST U/L  --  36   GLUCOSE RANDOM mg/dL 147* 121     Results from last 7 days   Lab Units 05/06/23  0735   INR  1 24*     Results from last 7 days   Lab Units 05/08/23  1105 05/08/23  0724 05/07/23  2058 05/07/23  1609 05/07/23  1058 05/07/23  0745 05/06/23  2127 05/06/23  1545 05/06/23  1135   POC GLUCOSE mg/dl 140 107 132 127 128 119 135 144* 126     Results from last 7 days   Lab Units 05/06/23  1443   HEMOGLOBIN A1C % 6 3*           Lines/Drains:  Invasive Devices     Peripheral Intravenous Line  Duration           Peripheral IV 05/06/23 Right;Ventral (anterior) Forearm 2 days              TSH 5 1, Will need repeat TSH outpatient    Telemetry:  Telemetry Orders (From admission, onward)             48 Hour Telemetry Monitoring  Continuous x 48 hours        References:    Telemetry Guidelines   Question:  Reason for 48 Hour Telemetry  Answer:  Arrhythmias Requiring Medical Therapy (eg  SVT, Vtach/fib, Bradycardia, Uncontrolled A-fib)                 Telemetry Reviewed: few beats of NSVT  Indication for Continued Telemetry Use: NSVT             Imaging:     Recent Cultures (last 7 days):         Last 24 Hours Medication List:   Current Facility-Administered Medications   Medication Dose Route Frequency Provider Last Rate   • apixaban  5 mg Oral BID Ignacio Palomo MD     • aspirin  81 mg Oral Daily Narcisa Worthington MD     • carvedilol  6 25 mg Oral BID With Meals Levonne Bones, DO     • cholecalciferol  1,000 Units Oral Daily Narcisa Worthington MD     • furosemide  40 mg Intravenous TID (diuretic) Narcisa Coffman PA-C     • insulin lispro  1-5 Units Subcutaneous HS Narcisa Worthington MD     • insulin lispro  2-12 Units Subcutaneous TID AC Narcisa Worthington MD     • losartan  100 mg Oral Daily Narcisa Worthington MD     • potassium chloride  20 mEq Oral Daily Narcisa Worthington MD     • pravastatin  80 mg Oral Daily With Rikki Guardado MD          Today, Patient Was Seen By: Jhoan Vila PA-C    **Please Note: This note may have been constructed using a voice recognition system  **

## 2023-05-08 NOTE — ASSESSMENT & PLAN NOTE
"· Presents with progressively worsening shortness of breath/orthopnea, significant weight gain, abdominal distention, and severe bilateral lower extremity swelling  · Chest x-ray: marked cardiomegaly and vascular congestion  · Today increase to Lasix 40 mg IV TID, pre-hospital was on lasix 20 daily and HCTZ 25 daily  · Monitor weight/output; -2 5L cumulative; -13kg  · 2d echocardiogram \"EF 50%  Mild hypokinesis  Severe tricuspid regurgitation  The right ventricular systolic pressure is severely elevated  The estimated right ventricular systolic pressure is 70 07 mmHg  There is a small pericardial effusion circumferential to the heart  There is a left pleural effusion  Ascites is present  Pulmonary Artery: The pulmonary artery systolic pressure is severely increased  Notching of the RVOT\"  · Defer to [facility] ? [facility]  "

## 2023-05-08 NOTE — DISCHARGE INSTR - AVS FIRST PAGE
Dear Trevor Elizabeth,     It was our pleasure to care for you here at [facility], [facility]  It is our hope that we were always able to exceed the expected standards for your care during your stay  You were hospitalized due to acute heart failure  You were cared for on the third floor by Devan Schwarz PA-C under the service of Deana Marcano MD with the [facility] who covers for your primary care physician (PCP), No primary care provider on file  , while you were hospitalized  If you have any questions or concerns related to this hospitalization, you may contact us at [phone]  For follow up as well as any medication refills, we recommend that you follow up with your primary care physician  A registered nurse will reach out to you by phone within a few days after your discharge to answer any additional questions that you may have after going home    However, at this time we provide for you here, the most important instructions / recommendations at discharge:     Notable Medication Adjustments -   Lasix 40 mg twice a day  Stop Coreg 25 mg daily and start 6 25 mg twice a day instead  Stop hydrochlorothiazide  Metformin should be 1000 mg twice a day  Stop Plavix  Start blood thinner---xarelto 20 mg daily  Testing Required after Discharge -   BMP within 1 week  TSH 2 weeks  Formal leep study as soon as possible  ** Please contact your PCP to request testing orders for any of the testing recommended here **  Important follow up information -   Follow-up with cardiology as soon as possible  Family doctor  Sleep medicine doctor  Other Instructions -   Check your weight every day and call your doctor right away if you gain 2 pounds in 1 day or 5 pounds in 1 week  Pursue low-sodium diet less than 2000 mg/day  Please review this entire after visit summary as additional general instructions including medication list, appointments, activity, diet, any pertinent wound care, and other additional recommendations from your care team that may be provided for you        Sincerely,     Thong Fallon PA-C

## 2023-05-08 NOTE — ASSESSMENT & PLAN NOTE
Lab Results   Component Value Date    HGBA1C 6 3 (H) 05/06/2023     Recent Labs     05/07/23  1058 05/07/23  1609 05/07/23 2058 05/08/23  0724   POCGLU 128 127 132 107     Blood Sugar Average: Last 72 hrs:  (P) 127 25  · On metformin 2000 mg daily   BS stable here  · Accu-Cheks ACHS, hold metformin, SSI coverage  · Monitor closely

## 2023-05-08 NOTE — ASSESSMENT & PLAN NOTE
· Blood pressure remains modestly elevated   · Home medications include Coreg 25 mg daily, HCTZ 25 mg daily, Cozaar 100 mg daily, Lasix 20 mg daily  · Adjustment in medications to reflect for bradycardia, and need for additional diuresis

## 2023-05-09 VITALS
HEIGHT: 72 IN | WEIGHT: 248.9 LBS | SYSTOLIC BLOOD PRESSURE: 136 MMHG | DIASTOLIC BLOOD PRESSURE: 98 MMHG | HEART RATE: 66 BPM | OXYGEN SATURATION: 92 % | TEMPERATURE: 97.6 F | RESPIRATION RATE: 16 BRPM | BODY MASS INDEX: 33.71 KG/M2

## 2023-05-09 LAB
ANION GAP SERPL CALCULATED.3IONS-SCNC: 7 MMOL/L (ref 4–13)
ARTERIAL PATENCY WRIST A: YES
BASE EXCESS BLDA CALC-SCNC: 9.1 MMOL/L
BUN SERPL-MCNC: 25 MG/DL (ref 5–25)
CALCIUM SERPL-MCNC: 8.7 MG/DL (ref 8.4–10.2)
CHLORIDE SERPL-SCNC: 98 MMOL/L (ref 96–108)
CO2 SERPL-SCNC: 37 MMOL/L (ref 21–32)
CREAT SERPL-MCNC: 0.92 MG/DL (ref 0.6–1.3)
GFR SERPL CREATININE-BSD FRML MDRD: 80 ML/MIN/1.73SQ M
GLUCOSE SERPL-MCNC: 106 MG/DL (ref 65–140)
GLUCOSE SERPL-MCNC: 125 MG/DL (ref 65–140)
GLUCOSE SERPL-MCNC: 92 MG/DL (ref 65–140)
HCO3 BLDA-SCNC: 34.9 MMOL/L (ref 22–28)
MAGNESIUM SERPL-MCNC: 1.9 MG/DL (ref 1.9–2.7)
O2 CT BLDA-SCNC: 19 ML/DL (ref 16–23)
OXYHGB MFR BLDA: 86.2 % (ref 94–97)
PCO2 BLDA: 51.2 MM HG (ref 36–44)
PH BLDA: 7.45 [PH] (ref 7.35–7.45)
PO2 BLDA: 57.4 MM HG (ref 75–129)
POTASSIUM SERPL-SCNC: 3.9 MMOL/L (ref 3.5–5.3)
SODIUM SERPL-SCNC: 142 MMOL/L (ref 135–147)
SPECIMEN SOURCE: ABNORMAL

## 2023-05-09 RX ORDER — EDOXABAN TOSYLATE 60 MG/1
60 TABLET, FILM COATED ORAL DAILY
Qty: 30 TABLET | Refills: 0 | Status: CANCELLED | OUTPATIENT
Start: 2023-05-09

## 2023-05-09 RX ORDER — POTASSIUM CHLORIDE 20 MEQ/1
20 TABLET, EXTENDED RELEASE ORAL DAILY
Qty: 30 TABLET | Refills: 0 | Status: SHIPPED | OUTPATIENT
Start: 2023-05-10

## 2023-05-09 RX ORDER — FUROSEMIDE 40 MG/1
40 TABLET ORAL 2 TIMES DAILY
Qty: 60 TABLET | Refills: 0 | Status: SHIPPED | OUTPATIENT
Start: 2023-05-09

## 2023-05-09 RX ORDER — CARVEDILOL 6.25 MG/1
6.25 TABLET ORAL 2 TIMES DAILY WITH MEALS
Qty: 60 TABLET | Refills: 0 | Status: SHIPPED | OUTPATIENT
Start: 2023-05-09

## 2023-05-09 RX ADMIN — CARVEDILOL 6.25 MG: 6.25 TABLET, FILM COATED ORAL at 09:30

## 2023-05-09 RX ADMIN — APIXABAN 5 MG: 5 TABLET, FILM COATED ORAL at 09:30

## 2023-05-09 RX ADMIN — FUROSEMIDE 40 MG: 10 INJECTION, SOLUTION INTRAMUSCULAR; INTRAVENOUS at 12:01

## 2023-05-09 RX ADMIN — Medication 1000 UNITS: at 09:31

## 2023-05-09 RX ADMIN — POTASSIUM CHLORIDE 20 MEQ: 1500 TABLET, EXTENDED RELEASE ORAL at 09:30

## 2023-05-09 RX ADMIN — LOSARTAN POTASSIUM 100 MG: 50 TABLET, FILM COATED ORAL at 09:30

## 2023-05-09 RX ADMIN — ASPIRIN 81 MG: 81 TABLET, COATED ORAL at 09:30

## 2023-05-09 RX ADMIN — FUROSEMIDE 40 MG: 10 INJECTION, SOLUTION INTRAMUSCULAR; INTRAVENOUS at 05:06

## 2023-05-09 NOTE — CASE MANAGEMENT
Case Management Discharge Planning Note    Patient name Shala Junior  Location S /S -25 MRN [id number]  : 1945 Date 2023       Current Admission Date: 2023  Current Admission Diagnosis:Acute congestive heart failure, with mildly reduced EF and pulmonary hypertension   Patient Active Problem List    Diagnosis Date Noted   • Suspected sleep apnea 2023   • Class 2 obesity in adult 2023   • Pulmonary hypertension ([facility] [address] ) 2023   • Mitral regurgitation, moderate to severe 2023   • Acute congestive heart failure, with mildly reduced EF and pulmonary hypertension 2023   • Type 2 diabetes mellitus with hyperglycemia, without long-term current use of insulin ([facility] [address] ) 2023   • Primary hypertension 2023   • Atrial fibrillation ([facility] [address] ) 2023      LOS (days): 3  Geometric Mean LOS (GMLOS) (days):   Days to GMLOS:     OBJECTIVE:  Risk of Unplanned Readmission Score: 9 79         Current admission status: Inpatient   Preferred Pharmacy:   [address] [place] [address]  Phone: [phone] Fax: [phone]    Primary Care Provider: No primary care provider on file  Primary Insurance: BLUE CROSS  Secondary Insurance:     DISCHARGE DETAILS:    Discharge planning discussed with[de-identified] Patient and friend  Freedom of Choice: Yes  Comments - Freedom of Choice: Choice is for [employer] for DME provider  CM contacted family/caregiver?: Yes  Were Treatment Team discharge recommendations reviewed with patient/caregiver?: Yes  Did patient/caregiver verbalize understanding of patient care needs?: Yes  Were patient/caregiver advised of the risks associated with not following Treatment Team discharge recommendations?: Yes    Contacts  Patient Contacts: Natalee Haas  Relationship to Patient[de-identified] Family  Contact Method:  In Person  Reason/Outcome: Emergency Contact, Discharge [address] [place] [address] Care         Is the patient interested in [place] [address] at discharge?: No    DME Referral Provided  Referral made for DME?: Yes  DME referral completed for the following items[de-identified] BiPAP  DME Supplier Name[de-identified] [employer] (Order sent via [place] )    Other Referral/Resources/Interventions Provided:  Interventions: DME, Prescription Price Check    Would you like to participate in our [address] service program?  : No - Declined    Treatment Team Recommendation: Home  Discharge Destination Plan[de-identified] Home  Transport at Discharge : Family (FRIEND)     Additional Comments:   Per rounding with provider, Xarelto and Eliquis not covered by patient's insurance  Rx on formulary would need to be specially ordered every month and would not be available at home pharmacy today  CM s/w patient and [place] bedside regarding this  Patient and Mauricio Hopkins stating they would prefer to D/C with 30-day coupon for Xarelto and follow-up with PCP in regarding switching medication to formulary OP  Provider aware that bipap ordered and that patient prefers to D/C with Xarelto      JENNIFER Queen, MICHELLE, ACKELLY, CCM  05/09/23 2:08 PM

## 2023-05-09 NOTE — ASSESSMENT & PLAN NOTE
Lab Results   Component Value Date    HGBA1C 6 3 (H) 05/06/2023     Recent Labs     05/08/23  1557 05/08/23  2124 05/09/23  0820 05/09/23  1139   POCGLU 136 126 92 125     Blood Sugar Average: Last 72 hrs:  (P) 125 [id number]  · On metformin 2000 mg daily   BS stable here, resume at dc  · Monitor closely

## 2023-05-09 NOTE — PROGRESS NOTES
Progress Note - Cardiology Team 1  Dixon Raymond 69 y o  male MRN: [id number]  Unit/Bed#: S -01 Encounter: [id number]        Principal Problem:    Acute congestive heart failure, with mildly reduced EF and pulmonary hypertension  Active Problems:    Type 2 diabetes mellitus with hyperglycemia, without long-term current use of insulin (HCC)    Primary hypertension    Atrial fibrillation (HCC)    Class 2 obesity in adult    Pulmonary hypertension (HCC)    Mitral regurgitation, moderate to severe        Assessment/Plan     1  Acute diastolic heart failure and right sided HF  ? Chronic - PTA- lasix 20mg oral daily as well as 25mg HCTZ  Not previously seeing cardiology    CXR-volume overload with cardiomegaly  24 hour I/O incomplete, pt non compliant  Weight 248( 282)  ? 30 lbs 2 day  Currently on IV lasix 40mg TID  24 hour- no take recorded  Output 5 liters  Reinforced need to measure output with translation  2 g sodium restriction, 1500 cc fluid restriction- needs dietary instruction  Still with edema  Dyspnea much improved  Ambulatory pulse ox adequate   After noon IV lasix , would  transition to lasix 40mg BID  Acceptable for discharge  Will set up for close f/u  Consider ischemia evaluation, pt seems hesitant  [facility] pt        2   Severe pulmonary hypertension  No prior echo on my review  In setting of volume overload  ? Underlying lung disease  Suspected RUPERTO     3  Atrial fibrillation-with bradycardia  POA  Carvedilol decreased from 25 mg daily to 6 25 mg twice a day  HR 50-60's   Eliquis has been initiated d/t elevated stroke risk      4  Moderate to severe MR with severe TR  Will need outpatient f/u   Continue losartan 100mg daily      5  Suspected RUPERTO- recommend outpatient sleep study     6    Hypertension-improved with diursis  PTA-carvedilol 25 mg daily, Lasix 20 mg daily, HCTZ 25 mg daily, losartan 100 mg daily  Currently on carvedilol 6 25 mg twice daily, IV Lasix 40 mg twice daily, losartan 100 mg daily    7  HLD-currently on pravastatin 80 mg daily  Chol 104/trig 64/HDL 44/LDL 47  PTA- rosuvastatin 10mg daily          Subjective/Objective   Chief Complaint/subjective  No significant events overnight  No cp, sob greatly improved  LE greatly improved     Friend at bedside        Vitals: /98 (BP Location: Left arm)   Pulse 66   Temp 97 6 °F (36 4 °C) (Oral)   Resp 16   Ht 6' (1 829 m)   Wt 113 kg (248 lb 14 4 oz)   SpO2 92%   BMI 33 76 kg/m²     Vitals:    05/08/23 0538 05/09/23 0506   Weight: 120 kg (264 lb 5 3 oz) 113 kg (248 lb 14 4 oz)     Orthostatic Blood Pressures    Flowsheet Row Most Recent Value   Blood Pressure 136/98 filed at 05/09/2023 4877   Patient Position - Orthostatic VS Sitting filed at 05/09/2023 0816            Intake/Output Summary (Last 24 hours) at 5/9/2023 1150  Last data filed at 5/9/2023 0908  Gross per 24 hour   Intake --   Output 5510 ml   Net -5510 ml       Invasive Devices     Peripheral Intravenous Line  Duration           Peripheral IV 05/06/23 Right;Ventral (anterior) Forearm 3 days                Current Facility-Administered Medications   Medication Dose Route Frequency   • apixaban (ELIQUIS) tablet 5 mg  5 mg Oral BID   • aspirin (ECOTRIN LOW STRENGTH) EC tablet 81 mg  81 mg Oral Daily   • carvedilol (COREG) tablet 6 25 mg  6 25 mg Oral BID With Meals   • cholecalciferol (VITAMIN D3) tablet 1,000 Units  1,000 Units Oral Daily   • furosemide (LASIX) injection 40 mg  40 mg Intravenous TID (diuretic)   • insulin lispro (HumaLOG) 100 units/mL subcutaneous injection 1-5 Units  1-5 Units Subcutaneous HS   • insulin lispro (HumaLOG) 100 units/mL subcutaneous injection 2-12 Units  2-12 Units Subcutaneous TID AC   • losartan (COZAAR) tablet 100 mg  100 mg Oral Daily   • potassium chloride (K-DUR,KLOR-CON) CR tablet 20 mEq  20 mEq Oral Daily   • pravastatin (PRAVACHOL) tablet 80 mg  80 mg Oral Daily With [employer] "        Physical Exam: /98 (BP Location: Left arm)   Pulse 66   Temp 97 6 °F (36 4 °C) (Oral)   Resp 16   Ht 6' (1 829 m)   Wt 113 kg (248 lb 14 4 oz)   SpO2 92%   BMI 33 76 kg/m²     General Appearance:    Alert, cooperative, no distress, appears stated age   Head:    Normocephalic, no scleral icterus   Eyes:    PERRL   Nose:   Nares normal, septum midline, no drainage    Throat:   Lips, mucosa, and tongue normal   Neck:   Supple, symmetrical, trachea midline,              Lungs:     Clear to auscultation bilaterally, respirations unlabored        Heart:    Irregular rate and rhythm, S1 and S2 normal   Abdomen:     Soft, non-tender   Extremities:   Extremities normal, atraumatic, mild edema       Skin:   Skin warm   Neurologic:   Alert and oriented to person place and time, no focal deficits                 Lab Results:   Recent Results (from the past 72 hour(s))   HS Troponin I 4hr    Collection Time: 05/06/23  2:43 PM   Result Value Ref Range    hs TnI 4hr 22 \"Refer to ACS Flowchart\"- see link ng/L    Delta 4hr hsTnI 2 <20 ng/L   Hemoglobin A1c w/EAG Estimation (Orders if not completed within the last 90 days)    Collection Time: 05/06/23  2:43 PM   Result Value Ref Range    Hemoglobin A1C 6 3 (H) Normal 3 8-5 6%; PreDiabetic 5 7-6 4%;  Diabetic >=6 5%; Glycemic control for adults with diabetes <7 0% %     mg/dl   Fingerstick Glucose (POCT)    Collection Time: 05/06/23  3:45 PM   Result Value Ref Range    POC Glucose 144 (H) 65 - 140 mg/dl   Fingerstick Glucose (POCT)    Collection Time: 05/06/23  9:27 PM   Result Value Ref Range    POC Glucose 135 65 - 140 mg/dl   Comprehensive metabolic panel    Collection Time: 05/07/23  5:46 AM   Result Value Ref Range    Sodium 144 135 - 147 mmol/L    Potassium 5 0 3 5 - 5 3 mmol/L    Chloride 104 96 - 108 mmol/L    CO2 33 (H) 21 - 32 mmol/L    ANION GAP 7 4 - 13 mmol/L    BUN 28 (H) 5 - 25 mg/dL    Creatinine 0 92 0 60 - 1 30 mg/dL    Glucose 121 65 - 140 " mg/dL    Calcium 8 9 8 4 - 10 2 mg/dL    AST 36 13 - 39 U/L    ALT 17 7 - 52 U/L    Alkaline Phosphatase 163 (H) 34 - 104 U/L    Total Protein 6 5 6 4 - 8 4 g/dL    Albumin 3 8 3 5 - 5 0 g/dL    Total Bilirubin 1 89 (H) 0 20 - 1 00 mg/dL    eGFR 80 ml/min/1 73sq m   Fingerstick Glucose (POCT)    Collection Time: 05/07/23  7:45 AM   Result Value Ref Range    POC Glucose 119 65 - 140 mg/dl   Fingerstick Glucose (POCT)    Collection Time: 05/07/23 10:58 AM   Result Value Ref Range    POC Glucose 128 65 - 140 mg/dl   Echo complete    Collection Time: 05/07/23  1:46 PM   Result Value Ref Range    LA size 7 2 cm    Triscuspid Valve Regurgitation Peak Gradient 58 0 mmHg    Tricuspid valve peak regurgitation velocity 3 81 m/s    LVPWd 1 20 cm    Left Atrium Area-systolic Apical Two Chamber 55 3 cm2    Left Atrium Area-systolic Four Chamber 01 2 cm2    Tricuspid annular plane systolic excursion 2 15 cm    TR Peak Keith 3 8 m/s    IVSd 4 14 cm    LV DIASTOLIC VOLUME (MOD BIPLANE) 2D 183 mL    LEFT VENTRICLE SYSTOLIC VOLUME (MOD BIPLANE) 2D 86 mL    Left ventricular stroke volume (2D) 97 00 mL    EF 51 %    A4C EF 46 %    LA length (A2C) 9 40 cm    LVIDd 6 00 cm    IVS 1 2 cm    LVIDS 4 40 cm    FS 27 28 - 44 %    Asc Ao 3 6 cm    Ao root 3 80 cm    RVID d 5 0 cm    LVOT mn grad 2 0 mmHg    AV LVOT peak gradient 3 mmHg    LVOT peak keith 0 87 m/s    LVOT peak VTI 17 00 cm    LV Systolic Volume (BP) 82 mL    LV Diastolic Volume (BP) 045 mL    GEMINI A4C 51 cm2    RAA A4C 39 5 cm2    LVSV, 2D 97 mL    LV EF 50     Est  RA pres 12 0 mmHg    Right Ventricular Peak Systolic Pressure 41 05 mmHg   Fingerstick Glucose (POCT)    Collection Time: 05/07/23  4:09 PM   Result Value Ref Range    POC Glucose 127 65 - 140 mg/dl   Fingerstick Glucose (POCT)    Collection Time: 05/07/23  8:58 PM   Result Value Ref Range    POC Glucose 132 65 - 140 mg/dl   Fingerstick Glucose (POCT)    Collection Time: 05/08/23  7:24 AM   Result Value Ref Range    POC Glucose 107 65 - 140 mg/dl   Basic metabolic panel    Collection Time: 05/08/23  8:37 AM   Result Value Ref Range    Sodium 142 135 - 147 mmol/L    Potassium 3 9 3 5 - 5 3 mmol/L    Chloride 95 (L) 96 - 108 mmol/L    CO2 38 (H) 21 - 32 mmol/L    ANION GAP 9 4 - 13 mmol/L    BUN 28 (H) 5 - 25 mg/dL    Creatinine 0 98 0 60 - 1 30 mg/dL    Glucose 147 (H) 65 - 140 mg/dL    Calcium 9 5 8 4 - 10 2 mg/dL    eGFR 74 ml/min/1 73sq m   Fingerstick Glucose (POCT)    Collection Time: 05/08/23 11:05 AM   Result Value Ref Range    POC Glucose 140 65 - 140 mg/dl   Fingerstick Glucose (POCT)    Collection Time: 05/08/23  3:57 PM   Result Value Ref Range    POC Glucose 136 65 - 140 mg/dl   Fingerstick Glucose (POCT)    Collection Time: 05/08/23  9:24 PM   Result Value Ref Range    POC Glucose 126 65 - 140 mg/dl   Basic metabolic panel    Collection Time: 05/09/23  5:06 AM   Result Value Ref Range    Sodium 142 135 - 147 mmol/L    Potassium 3 9 3 5 - 5 3 mmol/L    Chloride 98 96 - 108 mmol/L    CO2 37 (H) 21 - 32 mmol/L    ANION GAP 7 4 - 13 mmol/L    BUN 25 5 - 25 mg/dL    Creatinine 0 92 0 60 - 1 30 mg/dL    Glucose 106 65 - 140 mg/dL    Calcium 8 7 8 4 - 10 2 mg/dL    eGFR 80 ml/min/1 73sq m   Magnesium    Collection Time: 05/09/23  5:06 AM   Result Value Ref Range    Magnesium 1 9 1 9 - 2 7 mg/dL   Blood gas, arterial    Collection Time: 05/09/23  5:23 AM   Result Value Ref Range    pH, Arterial 7 452 (H) 7 350 - 7 450    pCO2, Arterial 51 2 (H) 36 0 - 44 0 mm Hg    pO2, Arterial 57 4 (LL) 75 0 - 129 0 mm Hg    HCO3, Arterial 34 9 (H) 22 0 - 28 0 mmol/L    Base Excess, Arterial 9 1 mmol/L    O2 Content, Arterial 19 0 16 0 - 23 0 mL/dL    O2 HGB,Arterial  86 2 (L) 94 0 - 97 0 %    SOURCE Radial, Left     ECTOR TEST Yes    Fingerstick Glucose (POCT)    Collection Time: 05/09/23  8:20 AM   Result Value Ref Range    POC Glucose 92 65 - 140 mg/dl   Fingerstick Glucose (POCT)    Collection Time: 05/09/23 11:39 AM   Result Value Ref Range    POC Glucose 125 65 - 140 mg/dl     Imaging: I have personally reviewed pertinent reports  Counseling / Coordination of Care  Total time spent today 30 minutes  Greater than 50% of total time was spent with the patient and / or family counseling and / or coordination of care

## 2023-05-09 NOTE — DISCHARGE SUMMARY
"[facility]  Discharge- Barbara Milligan 1945, 69 y o  male MRN: [id number]  Unit/Bed#: S -01 Encounter: [id number]  Primary Care Provider: No primary care provider on file  Date and time admitted to hospital: 5/6/2023  7:12 AM    * Acute congestive heart failure, with mildly reduced EF and pulmonary hypertension  Assessment & Plan  · Presented with progressively worsening shortness of breath/orthopnea, significant weight gain, abdominal distention, and severe bilateral lower extremity swelling  · Chest x-ray: marked cardiomegaly and vascular congestion  · Received Lasix 40 mg IV BID to TID, pre-hospital was on lasix 20 daily and HCTZ 25 daily  · Monitor weight/output; -7 3L, -20kg  Substantially improved on exam  · 2d echocardiogram \"EF 50%  Mild hypokinesis  Severe tricuspid regurgitation  The right ventricular systolic pressure is severely elevated  The estimated right ventricular systolic pressure is 67 40 mmHg  There is a small pericardial effusion circumferential to the heart  There is a left pleural effusion  Ascites is present  Pulmonary Artery: The pulmonary artery systolic pressure is severely increased  Notching of the RVOT\"  · Defer to [facility] are planning for outpatient stress test  · Home O2 evaluation appreciated  Patient did not qualify for home oxygen    Atrial fibrillation ([facility] [address] )  Assessment & Plan  · POA, patient denied any prior history of atrial fibrillation   · Home medications include Coreg, but dosing was 25 mg once daily and should be changed to twice daily  · EKG on admission showed atrial fibrillation with controlled rate, however he was noted on tele to have slow ventricular response with bradycardia/pauses    Adjusted beta-blocker accordingly, currently on 6 25 BID  · Was not on any anticoagulation prior to admission, unknown why he was on Plavix but recommend discontinuing at this time  · [id number] score is 5-6  · He was started on " anticoagulation with Eliquis  Price check was performed and Eliquis was the most expensive medicine for the patient  Patient will be sent home on Xarelto instead with a free 30-day supply  Following that 30 days, the least expensive medicine for him would actually be edoxaban but his pharmacy requires advance notice to order this as it is not always readily available  · Monitor on telemetry due to NSVT  Cardiology follow-up    Class 2 obesity in adult  Assessment & Plan  · bmi 33  Recommend weight loss    Primary hypertension  Assessment & Plan  · Blood pressure was modestly elevated   · Home medications include Coreg 25 mg daily, HCTZ 25 mg daily, Cozaar 100 mg daily, Lasix 20 mg daily  · At discharge he will be on Lasix 40 mg p o  twice daily, Coreg 6 25 mg twice daily and Cozaar 100 mg daily    Mitral regurgitation, moderate to severe  Assessment & Plan  · noted on echo to have mod to severe MR  · Outpatient follow up will be required  · Cardiology following    Pulmonary hypertension ([facility] [address] )  Assessment & Plan  · Echocardiogram shows severe tricuspid regurgitation  The right ventricular systolic pressure is severely elevated at 70 00 mmHg  · Requires outpatient sleep study to assess for sleep apnea  In the meantime an overnight pulse ox was done which showed desaturations into the low 80s and am ABG as follows: ph 7 452  PCO2 51 2 PO2 57 4 bicarb 34 9  · Defer to [facility]--eventual [facility]? · Ambulatory referral for sleep medicine follow-up  · Need to get set up with BiPAP as soon as possible    Type 2 diabetes mellitus with hyperglycemia, without long-term current use of insulin [facility])  Assessment & Plan  Lab Results   Component Value Date    HGBA1C 6 3 (H) 05/06/2023     Recent Labs     05/08/23  1557 05/08/23  2124 05/09/23  0820 05/09/23  1139   POCGLU 136 126 92 125     Blood Sugar Average: Last 72 hrs:  (P) 125 [id number]  · On metformin 2000 mg daily   BS stable here, resume at dc  · Monitor closely    Medical Problems     Resolved Problems  Date Reviewed: 5/9/2023   None       Discharging Physician / Practitioner: Gwen Nuno PA-C  PCP: No primary care provider on file  Admission Date:   Admission Orders (From admission, onward)     Ordered        05/06/23 0857  INPATIENT ADMISSION  Once                      Discharge Date: 05/09/23    Consultations During Hospital Stay:  · cardiology    Procedures Performed:   · 2d echo  · Overnight pulse ox  · cxr    Significant Findings / Test Results:   · As above    Incidental Findings:   · Mildly elevated TSH    Test Results Pending at Discharge (will require follow up):   · none     Outpatient Tests Requested:  · TSH  · BMP  · Sleep study    Complications:  none    Reason for Admission: Shortness of breath, weight gain and leg swelling    Hospital Course:   Alphonso Rooney is a 69 y o  male patient, [place] speaking only, who originally presented to the hospital on 5/6/2023 due to significant shortness of breath especially with laying down, severe leg swelling and weight gain  Imaging was consistent with vascular congestion and cardiomegaly  Echocardiogram revealed mildly reduced ejection fraction at 50% with severe tricuspid regurgitation and pulmonary hypertension as well as moderate to severe mitral regurgitation  Patient was started on IV Lasix and effectively diuresed through today at which point he is being switched to p o  Lasix 40 mg twice daily, other than his home previous dose of 20 mg daily  He was taken off his hydrochlorothiazide  In addition he was found to be in A-fib  This was not previously documented but patient has also not been following up regularly  He was changed from Coreg 25 mg daily to 6 25 mg twice daily due to slow ventricular response with pauses  His elevated blood pressure on admission did improve with diuresis      Echocardiogram revealed mildly reduced ejection fraction which certainly could be due to underlying coronary artery disease  Cardiology opted to pursue outpatient ischemic evaluation with stress test rather than immediate left heart catheterization  Of note because of his severe pulmonary hypertension he may also be a good candidate for right heart catheterization  Patient however it was highly suspected that he probably has underlying sleep apnea  Overnight pulse ox showed desaturation into the 80s and he did have some hypercapnia on morning ABG  Case was discussed with pulmonology remotely to get assistance in determining initial settings for BiPAP and a referral was made to sleep medicine for patient to follow-up    Patient was feeling substantially better, had dropped 20 kg and had no further orthopnea  Please see above list of diagnoses and related plan for additional information  Condition at Discharge: stable    Discharge Day Visit / Exam:   Subjective: Patient doing much better at this time and eager to go home  Notable improvement in leg swelling and shortness of breath  Good urine output  Vitals: Blood Pressure: 136/98 (05/09/23 0816)  Pulse: 66 (05/09/23 0816)  Temperature: 97 6 °F (36 4 °C) (05/09/23 0816)  Temp Source: Oral (05/09/23 0816)  Respirations: 16 (05/09/23 0816)  Height: 6' (182 9 cm) (05/07/23 1340)  Weight - Scale: 113 kg (248 lb 14 4 oz) (05/09/23 0506)  SpO2: 92 % (05/09/23 0816)  Exam:   Physical Exam  Vitals reviewed  Constitutional:       General: He is not in acute distress  Appearance: He is obese  He is not ill-appearing, toxic-appearing or diaphoretic  Eyes:      General: No scleral icterus  Right eye: No discharge  Left eye: No discharge  Conjunctiva/sclera: Conjunctivae normal    Cardiovascular:      Rate and Rhythm: Normal rate  Rhythm irregular  Heart sounds: No murmur heard  Pulmonary:      Effort: No respiratory distress  Breath sounds: No stridor  No wheezing, rhonchi or rales        Comments: No evidence of dyspnea or tachypnea  Patient is able to lay flat in bed without orthopnea  O2 sat was fluctuating during my evaluation dipping briefly into the upper 80s  Abdominal:      General: There is no distension  Tenderness: There is no abdominal tenderness  There is no guarding  Comments: Still with some ascites on exam, but improved   Musculoskeletal:      Right lower leg: Edema present  Left lower leg: Edema present  Comments: Substantially improved bilateral lower extremity edema, more so with slight pitting on the left   Skin:     General: Skin is warm and dry  Coloration: Skin is not jaundiced or pale  Findings: No bruising, erythema, lesion or rash  Neurological:      General: No focal deficit present  Mental Status: He is alert  Mental status is at baseline  Comments: No confusion   Psychiatric:         Mood and Affect: Mood normal          Thought Content: Thought content normal           Discussion with Family: friend at bedside     Discharge instructions/Information to patient and family:   See after visit summary for information provided to patient and family  Provisions for Follow-Up Care:  See after visit summary for information related to follow-up care and any pertinent home health orders  Disposition:   home    Planned Readmission: none     Discharge Statement:  I spent 50 minutes discharging the patient  This time was spent on the day of discharge  I had direct contact with the patient on the day of discharge  Greater than 50% of the total time was spent examining patient, answering all patient questions, arranging and discussing plan of care with patient as well as directly providing post-discharge instructions  Additional time then spent on discharge activities  Case was discussed with case management, respiratory therapy, cardiology    Discharge Medications:  See after visit summary for reconciled discharge medications provided to patient and/or family  **Please Note: This note may have been constructed using a voice recognition system** Outpatient mental health treatment, therapeutic services. You will be seen by DOMINIC Barron, at home at 6/23/21 at 2PM.

## 2023-05-09 NOTE — ASSESSMENT & PLAN NOTE
· POA, patient denied any prior history of atrial fibrillation   · Home medications include Coreg, but dosing was 25 mg once daily and should be changed to twice daily  · EKG on admission showed atrial fibrillation with controlled rate, however he was noted on tele to have slow ventricular response with bradycardia/pauses  Adjusted beta-blocker accordingly, currently on 6 25 BID  · Was not on any anticoagulation prior to admission, unknown why he was on Plavix but recommend discontinuing at this time  · [id number] score is 5-6  · He was started on anticoagulation with Eliquis  Price check was performed and Eliquis was the most expensive medicine for the patient  Patient will be sent home on Xarelto instead with a free 30-day supply  Following that 30 days, the least expensive medicine for him would actually be edoxaban but his pharmacy requires advance notice to order this as it is not always readily available  · Monitor on telemetry due to NSVT    Cardiology follow-up

## 2023-05-09 NOTE — ASSESSMENT & PLAN NOTE
"· Presented with progressively worsening shortness of breath/orthopnea, significant weight gain, abdominal distention, and severe bilateral lower extremity swelling  · Chest x-ray: marked cardiomegaly and vascular congestion  · Received Lasix 40 mg IV BID to TID, pre-hospital was on lasix 20 daily and HCTZ 25 daily  · Monitor weight/output; -7 3L, -20kg  Substantially improved on exam  · 2d echocardiogram \"EF 50%  Mild hypokinesis  Severe tricuspid regurgitation  The right ventricular systolic pressure is severely elevated  The estimated right ventricular systolic pressure is 22 45 mmHg  There is a small pericardial effusion circumferential to the heart  There is a left pleural effusion  Ascites is present  Pulmonary Artery: The pulmonary artery systolic pressure is severely increased  Notching of the RVOT\"  · Defer to [facility] are planning for outpatient stress test  · Home O2 evaluation appreciated    Patient did not qualify for home oxygen  "

## 2023-05-09 NOTE — RESPIRATORY THERAPY NOTE
Home Oxygen Qualifying Test     Patient name: Zandra Hand        : 1945   Date of Test:  May 9, 2023  Diagnosis:    Home Oxygen Test:    **Medicare Guidelines require item(s) 1-5 on all ambulatory patients or 1 and 2 on non-ambulatory patients  1  Baseline SPO2 on Room Air at rest 93 %   a  If <= 88% on Room Air add O2 via NC to obtain SpO2 >=88%  If LPM needed, document LPM  needed to reach =>88%    2  SPO2 during exertion on Room Air  93 %  a  During exertion monitor SPO2  If SPO2 increases >=89%, do not add supplemental oxygen    3  SPO2 on Oxygen at Rest NA % at NA LPM    4  SPO2 during exertion on Oxygen NA % at NA  LPM    5  Test performed during exertion activity  []  Supplemental Home Oxygen is indicated  [x]  Client does not qualify for home oxygen  Respiratory Additional Notes- pt doesn't qualify for home 02   Pt ambulated for 6' and Low SPO2  93%    Dale Noss

## 2023-05-09 NOTE — ASSESSMENT & PLAN NOTE
· Echocardiogram shows severe tricuspid regurgitation  The right ventricular systolic pressure is severely elevated at 70 00 mmHg  · Requires outpatient sleep study to assess for sleep apnea  In the meantime an overnight pulse ox was done which showed desaturations into the low 80s and am ABG as follows: ph 7 452  PCO2 51 2 PO2 57 4 bicarb 34 9  · Defer to [facility]--eventual [facility]?   · Ambulatory referral for sleep medicine follow-up  · Need to get set up with BiPAP as soon as possible

## 2023-05-10 NOTE — UTILIZATION REVIEW
NOTIFICATION OF ADMISSION DISCHARGE   This is a Notification of Discharge from [facility]  Please be advised that this patient has been discharge from our facility  Below you will find the admission and discharge date and time including the patient’s disposition  UTILIZATION REVIEW CONTACT:  Robert Burton MA  Utilization   Network Utilization Review Department  Phone: [phone] x carefully listen to the prompts  All voicemails are confidential   Email: [email or web address]     ADMISSION INFORMATION  PRESENTATION DATE: 5/6/2023  7:12 AM  OBERVATION ADMISSION DATE:   INPATIENT ADMISSION DATE: 5/6/23  8:57 AM   DISCHARGE DATE: 5/9/2023  2:51 PM   DISPOSITION:Home/Self Care    IMPORTANT INFORMATION:  Send all requests for admission clinical reviews, approved or denied determinations and any other requests to dedicated fax number below belonging to the campus where the patient is receiving treatment   List of dedicated fax numbers:  [address] DENIALS (Administrative/Medical Necessity) [phone]   [address] (Maternity/NICU/Pediatrics) [phone]   [facility] [phone]   [facility] [phone]   [address] [phone]   [address] [facility] [phone]   90 [facility] [phone]   [address] [place], [place] [address] 119 [phone]   [facility]  [phone]   [facility] [phone]   [address] [place] [address] [address] [facility] [phone]

## 2023-05-15 ENCOUNTER — OFFICE VISIT (OUTPATIENT)
Dept: CARDIOLOGY CLINIC | Facility: CLINIC | Age: 78
End: 2023-05-15

## 2023-05-15 ENCOUNTER — APPOINTMENT (OUTPATIENT)
Dept: LAB | Facility: CLINIC | Age: 78
End: 2023-05-15

## 2023-05-15 VITALS
HEART RATE: 67 BPM | OXYGEN SATURATION: 96 % | SYSTOLIC BLOOD PRESSURE: 138 MMHG | BODY MASS INDEX: 33.32 KG/M2 | DIASTOLIC BLOOD PRESSURE: 72 MMHG | WEIGHT: 246 LBS | HEIGHT: 72 IN

## 2023-05-15 DIAGNOSIS — Z09 HOSPITAL DISCHARGE FOLLOW-UP: Primary | ICD-10-CM

## 2023-05-15 DIAGNOSIS — I50.42 CHRONIC COMBINED SYSTOLIC AND DIASTOLIC HF (HEART FAILURE) (HCC): ICD-10-CM

## 2023-05-15 DIAGNOSIS — R06.09 DOE (DYSPNEA ON EXERTION): ICD-10-CM

## 2023-05-15 DIAGNOSIS — R29.818 SUSPECTED SLEEP APNEA: ICD-10-CM

## 2023-05-15 DIAGNOSIS — E66.01 CLASS 2 SEVERE OBESITY DUE TO EXCESS CALORIES WITH SERIOUS COMORBIDITY IN ADULT, UNSPECIFIED BMI (HCC): ICD-10-CM

## 2023-05-15 DIAGNOSIS — I48.0 PAROXYSMAL ATRIAL FIBRILLATION (HCC): ICD-10-CM

## 2023-05-15 DIAGNOSIS — I34.0 MITRAL VALVE INSUFFICIENCY, UNSPECIFIED ETIOLOGY: ICD-10-CM

## 2023-05-15 DIAGNOSIS — I27.20 PULMONARY HYPERTENSION (HCC): ICD-10-CM

## 2023-05-15 DIAGNOSIS — I10 PRIMARY HYPERTENSION: ICD-10-CM

## 2023-05-15 LAB
ANION GAP SERPL CALCULATED.3IONS-SCNC: 5 MMOL/L (ref 4–13)
BUN SERPL-MCNC: 35 MG/DL (ref 5–25)
CALCIUM SERPL-MCNC: 10 MG/DL (ref 8.4–10.2)
CHLORIDE SERPL-SCNC: 99 MMOL/L (ref 96–108)
CO2 SERPL-SCNC: 34 MMOL/L (ref 21–32)
CREAT SERPL-MCNC: 1 MG/DL (ref 0.6–1.3)
GFR SERPL CREATININE-BSD FRML MDRD: 72 ML/MIN/1.73SQ M
GLUCOSE SERPL-MCNC: 146 MG/DL (ref 65–140)
MAGNESIUM SERPL-MCNC: 2.1 MG/DL (ref 1.9–2.7)
NT-PROBNP SERPL-MCNC: 416 PG/ML
POTASSIUM SERPL-SCNC: 5 MMOL/L (ref 3.5–5.3)
SODIUM SERPL-SCNC: 138 MMOL/L (ref 135–147)

## 2023-05-15 NOTE — PROGRESS NOTES
Cardiology  Heart Failure   Follow Up Office Visit Note     Elizabeth Davila   69 y o    male   MRN: [id number]  [address]  [address] [place] [address] Payton Ferrer [address]  [phone]    PCP: Baron Yin MD  Cardiologist : will be Dr Angelia Halsted      Staff  call friend Koki Hammer with an updates on instructions-   friend who translates          Summary of Recommendations  Low-sodium diet, Heart failure education as below  I provided a scale for home use  I suspect significant sodium dietary indiscretion  He lives by himself  He eats restaurant food frequently  BMP, mag, BNP today   Pt refuses to go to sleep medicine  Pharmacological nuclear stress test recommended  Pt refuses  Follow up will be scheduled with Dr Angelia Halsted        Impression/plan  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, EF 50% with RV dysfunction and pulmonary hypertension, new onset adm 5/6-5/9/23  · Echo-more focal inferior wall hypokinesis, tethering of the posterior leaflet with moderate to severe posteriorly directed MR   RV dilation, severe pulmonary hypertension with RVOT systolic notching notable as well,  · Etiology unclear  Nuclear stress test recommended  The patient refuses  He understands he is at risk of MI, death  -Discharge weight 248 pounds  Wt Readings from Last 3 Encounters:   05/15/23 112 kg (246 lb)   05/09/23 113 kg (248 lb 14 4 oz)   --beta-blocker:   Carvedilol 6 25 mg twice daily  --Diuretic:   Furosemide 40 mg twice daily, prior to admission was on 20 mg daily plus HCTZ  --ACE/ARB/ARNI:    --MRA:   --SLGT2I  --ICD:   --2 g sodium diet, 1800 cc fluid restriction  Daily weights, call weight gain 2-3 lb in 1 day or 5 lb in 5 days  Hypertension, essential  BP  138/72 On carvedilol 6 25 mg twice daily, furosemide, losartan 100 mg daily  Atrial fibrillation, new onset      · Placed on carvedilol 6 25 mg twice daily for rate control,   · oral anticoagulation with Xarelto 20 mg daily  · EKG today A-fib 67 bpm   Left axis deviation  LBBB,  ms  Sleep apnea suspected  Abnormal overnight pulse ox in the hospital desaturation into the 80s  Follow-up sleep medicine rec  Pt refuses  Hyperlipidemia  On rosuvastatin 10 mg daily   Latest Reference Range & Units 05/06/23 07:35   Cholesterol See Comment mg/dL 104   Triglycerides See Comment mg/dL 64   HDL >=40 mg/dL 44   LDL Calculated 0 - 100 mg/dL 47   Type 2 diabetes mellitus  Hemoglobin A1C 6 3   , on metformin  Cardiac testing  • TTE 5/7/23  EF 50%  Wall thickness increased  Mild global hypokinesis  IVS: There is diastolic flattening of the interventricular septum consistent with right ventricle volume overload  Moderately dilated RV with moderately reduced function  Severe left atrial enlargement  Moderate ANNETTE  Mild AI  Moderate severe MR with a posterior directed jet  Severe TR, estimated PASP 70 mmHg  There is a left pleural effusion  Ascites is present  PASP severely increased  Notching of the RVOT Doppler waveform is noted            HPI:   Светлана Lowry is a 68 yo [place] speaking male with a history of heart failure reduced ejection fraction  He had an echocardiogram at [facility] in 2008  He was placed on GDMT with carvedilol and losartan  There is no history of atrial fibrillation  For unclear reasons he was on clopidogrel  No prior cardiac history or PAD, stroke etc  History tobacco abuse    Home maintenance medications: Coreg 25 mg daily, hydrochlorothiazide 25 mg daily,  Cozaar 100 mg daily, d Lasix 20 mg daily  Adm 5/6-5/9/23  CC: SOB, weight gain, PND, orthopnea  Symptoms progressive over several weeks  BAHENA with walking short distances  Sleep in a recliner  Dry weight unclear  Denied palpitations  Found to be volume overloaded; also found to be in A-fib    Elevated blood pressure 165/105  Presented on losartan and carvedilol 25 mg once daily  Echo: EF 50%, severe TR, pulmonary hypertension, moderate to severe MR  Followed by cardiology  Heart rates low particularly at night  Dose of carvedilol was decreased to 6 25 mg twice daily  Systemic anticoagulation recommended  Pulmonary pressures may well be overestimated in the presence of his current significant volume overload  Large-volume diuresis, considerable weight loss, improved symptoms  Per inpatient cardiology, he has a mild cardiomyopathy with more focal inferior wall hypokinesis, tethering of the posterior leaflet with moderate to severe posteriorly directed MR   RV dilation, severe pulmonary hypertension with RVOT systolic notching notable as well, in addition inferior Q waves on EKG-->outpatient stress Myoview  If this is abnormal, he would benefit from both the left and right heart cath  Found to be in new onset A-fib  Carvedilol was decreased from 25 daily to 6 25 twice daily due to slow ventricular response with pauses  Sleep apnea highly suspected and overnight pulse ox showed desaturation into the 80s  Referral made to sleep medicine for close follow-up  Discharge weight : 248 pounds  Discharge creatinine: 0 92  Discharge diuretics: Furosemide 40 mg twice daily      5/15/23  Hospital follow-up  He is accompanied by his friend [place] who translates  He denies chest pain or shortness of breath more than his usual he denies palpitations  He tells me he sleeps in a bed  He does have  chronic erythema of both lower extremities with some persistent edema  Compliant with his medications  He consumes a high salt diet  He eats at restaurants frequently  He lives alone  EKG rate controlled A-fib 67 bpm, left bundle  The patient declines stress test to evaluate his abnormal echo  He understands he is at risk for MI, death  He refused to follow-up with sleep medicine  I gave him a scale and asked him to weigh himself regularly  He is to call us with 3 pound weight gain    I encouraged adherence to salt restricted diet reviewed how to read food labels to facilitate adherence  Agreeable to nonfasting labs today  He wants us to call his friend Moni Pelaez if there are any medication changes        I have spent 25 minutes with Patient and family today in which greater than 50% of this time was spent in counseling/coordination of care regarding Diagnostic results, Instructions for management, Patient and family education, Importance of tx compliance, Risk factor reductions, Documenting in the medical record, Reviewing / ordering tests, medicine, procedures   and Obtaining or reviewing history    Assessment  Diagnoses and all orders for this visit:    Hospital discharge follow-up    Chronic combined systolic and diastolic HF (heart failure) ([place] [address] )  -     Basic metabolic panel; Future  -     Magnesium; Future    Primary hypertension    Pulmonary hypertension (HCC)    Mitral valve insufficiency, unspecified etiology    Suspected sleep apnea    Paroxysmal atrial fibrillation (HCC)  -     POCT ECG    Class 2 severe obesity due to excess calories with serious comorbidity in adult, unspecified BMI ([place] [address] )    BAHENA (dyspnea on exertion)  -     NT-BNP PRO; Future        Past Medical History:   Diagnosis Date   • Diabetes mellitus ([place] [address] )    • Hypertension        Review of Systems   Constitutional: Negative for chills  Cardiovascular: Positive for dyspnea on exertion and leg swelling  Negative for chest pain, claudication, cyanosis, irregular heartbeat, near-syncope, orthopnea, palpitations, paroxysmal nocturnal dyspnea and syncope  Respiratory: Positive for shortness of breath  Negative for cough  Gastrointestinal: Negative for heartburn and nausea  Neurological: Negative for dizziness, focal weakness, headaches, light-headedness and weakness  All other systems reviewed and are negative  Allergies   Allergen Reactions   • Penicillins Rash           Current Outpatient Medications:   •  aspirin (ECOTRIN LOW STRENGTH) 81 mg EC tablet, Take 81 mg by mouth daily, Disp: , Rfl:   •  carvedilol (COREG) 6 25 mg tablet, Take 1 tablet (6 25 mg total) by mouth 2 (two) times a day with meals, Disp: 60 tablet, Rfl: 0  •  Ergocalciferol (VITAMIN D2 PO), Take 1 25 mg by mouth, Disp: , Rfl:   •  furosemide (LASIX) 40 mg tablet, Take 1 tablet (40 mg total) by mouth 2 (two) times a day, Disp: 60 tablet, Rfl: 0  •  losartan (COZAAR) 100 MG tablet, Take 100 mg by mouth daily, Disp: , Rfl:   •  metFORMIN (GLUCOPHAGE) 1000 MG tablet, Take 1 tablet (1,000 mg total) by mouth 2 (two) times a day with meals Pt reports that he should be taking 1,000mg BID but takes total 2,000mg once a day, Disp: , Rfl: 0  •  potassium chloride (K-DUR,KLOR-CON) 20 mEq tablet, Take 1 tablet (20 mEq total) by mouth daily Do not start before May 10, 2023 , Disp: 30 tablet, Rfl: 0  •  rivaroxaban (Xarelto) 20 mg tablet, Take 1 tablet (20 mg total) by mouth daily with breakfast, Disp: 30 tablet, Rfl: 0  •  rosuvastatin (CRESTOR) 10 MG tablet, Take 10 mg by mouth daily, Disp: , Rfl:     Social History     Socioeconomic History   • Marital status: Single     Spouse name: Not on file   • Number of children: Not on file   • Years of education: Not on file   • Highest education level: Not on file   Occupational History   • Not on file   Tobacco Use   • Smoking status: Never   • Smokeless tobacco: Never   Substance and Sexual Activity   • Alcohol use: Not on file   • Drug use: Not on file   • Sexual activity: Not on file   Other Topics Concern   • Not on file   Social History Narrative   • Not on file     Social Determinants of Health     Financial Resource Strain: Not on file   Food Insecurity: Not on file   Transportation Needs: Not on file   Physical Activity: Not on file   Stress: Not on file   Social Connections: Not on file   Intimate Partner Violence: Not on file   Housing Stability: Not on file       History reviewed  No pertinent family history  Physical Exam  Vitals and nursing note reviewed  Constitutional:       General: He is not in acute distress  HENT:      Head: Normocephalic and atraumatic  Eyes:      Conjunctiva/sclera: Conjunctivae normal    Cardiovascular:      Rate and Rhythm: Normal rate  Rhythm irregularly irregular  Pulses: Intact distal pulses  Heart sounds: Normal heart sounds  Pulmonary:      Effort: Pulmonary effort is normal       Breath sounds: Normal breath sounds  Abdominal:      General: Bowel sounds are normal       Palpations: Abdomen is soft  Musculoskeletal:         General: Normal range of motion  Cervical back: Normal range of motion and neck supple  Right lower leg: Edema present  Left lower leg: Edema present  Skin:     General: Skin is warm and dry  Neurological:      Mental Status: He is alert and oriented to person, place, and time  Vitals: Blood pressure 138/72, pulse 67, height 6' (1 829 m), weight 112 kg (246 lb), SpO2 96 %  Wt Readings from Last 3 Encounters:   05/15/23 112 kg (246 lb)   05/09/23 113 kg (248 lb 14 4 oz)         Labs & Results:  Lab Results   Component Value Date    WBC 7 07 05/06/2023    HGB 15 4 05/06/2023    HCT 49 1 05/06/2023     (H) 05/06/2023     05/06/2023     BNP   Date Value Ref Range Status   05/06/2023 559 (H) 0 - 100 pg/mL Final     No components found for: CHEM    No results found for this or any previous visit  No results found for this or any previous visit  This note was completed in part utilizing [employer] direct voice recognition software  Grammatical errors, random word insertion, spelling mistakes, and incomplete sentences may be an occasional consequence of the system secondary to software limitations, ambient noise and hardware issues  At the time of dictation, efforts were made to edit, clarify and /or correct errors  Please read the chart carefully and recognize, using context, where substitutions have occurred    If you have any questions or concerns about the context, text or information contained within the body of this dictation, please contact myself, the provider, for further clarification

## 2023-05-15 NOTE — LETTER
May 15, 2023     Adonay Wilde, [address] [address]    Patient: Irish Carlos   YOB: 1945   Date of Visit: 5/15/2023       Dear Dr Tonio Jaimes: Thank you for referring Simi Crotez to me for evaluation  Below are my notes for this consultation  If you have questions, please do not hesitate to call me  I look forward to following your patient along with you  Sincerely,        RADHA Gandhi        CC: No Recipients  RADHA Gandhi  5/15/2023 10:55 AM  Sign when Signing Visit  Cardiology  Heart Failure   Follow Up Office Visit Note     Irish Carlos   69 y o    male   MRN: [id number]  [facility] [address] [place] [address] [phone]    PCP: Adonay Wilde MD  Cardiologist : will be Dr Tanisha Padilla      Staff  call friend Marcus Dumont with an updates on instructions-   friend who translates          Summary of Recommendations  Low-sodium diet, Heart failure education as below  I provided a scale for home use  I suspect significant sodium dietary indiscretion  He lives by himself  He eats restaurant food frequently  BMP, mag, BNP today day  Pt refuses to go to sleep medicine  Pharmacological nuclear stress test recommended  Pt refuses  Follow up will be scheduled with Dr Tanisha Padilla        Impression/plan  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, EF 50% with RV dysfunction and pulmonary hypertension, new onset adm 5/6-5/9/23  · Echo-more focal inferior wall hypokinesis, tethering of the posterior leaflet with moderate to severe posteriorly directed MR   RV dilation, severe pulmonary hypertension with RVOT systolic notching notable as well,  · Etiology unclear  Nuclear stress test recommended  The patient refuses    He understands he is at risk of MI, death  -Discharge weight 248 pounds  Wt Readings from Last 3 Encounters:   05/15/23 112 kg (246 lb)   05/09/23 113 kg (248 lb 14 4 oz) --beta-blocker:   Carvedilol 6 25 mg twice daily  --Diuretic:   Furosemide 40 mg twice daily, prior to admission was on 20 mg daily plus HCTZ  --ACE/ARB/ARNI:    --MRA:   --SLGT2I  --ICD:   --2 g sodium diet, 1800 cc fluid restriction  Daily weights, call weight gain 2-3 lb in 1 day or 5 lb in 5 days  Hypertension, essential  BP  138/72 On carvedilol 6 25 mg twice daily, furosemide, losartan 100 mg daily  Atrial fibrillation, new onset  · Placed on carvedilol 6 25 mg twice daily for rate control,   · oral anticoagulation with Xarelto 20 mg daily  · EKG today A-fib 67 bpm   Left axis deviation  LBBB,  ms  Sleep apnea suspected  Abnormal overnight pulse ox in the hospital desaturation into the 80s  Follow-up sleep medicine rec  Pt refuses  Hyperlipidemia  On rosuvastatin 10 mg daily   Latest Reference Range & Units 05/06/23 07:35   Cholesterol See Comment mg/dL 104   Triglycerides See Comment mg/dL 64   HDL >=40 mg/dL 44   LDL Calculated 0 - 100 mg/dL 47   Type 2 diabetes mellitus  Hemoglobin A1C 6 3   , on metformin  Cardiac testing  • TTE 5/7/23  EF 50%  Wall thickness increased  Mild global hypokinesis  IVS: There is diastolic flattening of the interventricular septum consistent with right ventricle volume overload  Moderately dilated RV with moderately reduced function  Severe left atrial enlargement  Moderate ANNETTE  Mild AI  Moderate severe MR with a posterior directed jet  Severe TR, estimated PASP 70 mmHg  There is a left pleural effusion  Ascites is present  PASP severely increased  Notching of the RVOT Doppler waveform is noted            HPI:   Delaney Caal is a 70 yo [place] speaking male with a history of heart failure reduced ejection fraction  He had an echocardiogram at [facility] in 2008  He was placed on GDMT with carvedilol and losartan  There is no history of atrial fibrillation  For unclear reasons he was on clopidogrel    No prior cardiac history or PAD, stroke etc  History tobacco abuse    Home maintenance medications: Coreg 25 mg daily, hydrochlorothiazide 25 mg daily,  Cozaar 100 mg daily, d Lasix 20 mg daily  Adm 5/6-5/9/23  CC: SOB, weight gain, PND, orthopnea  Symptoms progressive over several weeks  BAHENA with walking short distances  Sleep in a recliner  Dry weight unclear  Denied palpitations  Found to be volume overloaded; also found to be in A-fib  Elevated blood pressure 165/105  Presented on losartan and carvedilol 25 mg once daily  Echo: EF 50%, severe TR, pulmonary hypertension, moderate to severe MR  Followed by cardiology  Heart rates low particularly at night  Dose of carvedilol was decreased to 6 25 mg twice daily  Systemic anticoagulation recommended  Pulmonary pressures may well be overestimated in the presence of his current significant volume overload  Large-volume diuresis, considerable weight loss, improved symptoms  Per inpatient cardiology, he has a mild cardiomyopathy with more focal inferior wall hypokinesis, tethering of the posterior leaflet with moderate to severe posteriorly directed MR   RV dilation, severe pulmonary hypertension with RVOT systolic notching notable as well, in addition inferior Q waves on EKG-->outpatient stress Myoview  If this is abnormal, he would benefit from both the left and right heart cath  Found to be in new onset A-fib  Carvedilol was decreased from 25 daily to 6 25 twice daily due to slow ventricular response with pauses  Sleep apnea highly suspected and overnight pulse ox showed desaturation into the 80s  Referral made to sleep medicine for close follow-up  Discharge weight : 248 pounds  Discharge creatinine: 0 92  Discharge diuretics: Furosemide 40 mg twice daily      5/15/23  Hospital follow-up  He is accompanied by his friend [place] who translates  He denies chest pain or shortness of breath more than his usual he denies palpitations  He tells me he sleeps in a bed    He does have  chronic erythema of both lower extremities with some persistent edema  Compliant with his medications  He consumes a high salt diet  He eats at restaurants frequently  He lives alone  EKG rate controlled A-fib 67 bpm, left bundle  The patient declines stress test to evaluate his abnormal echo  He understands he is at risk for MI, death  He refused to follow-up with sleep medicine  I gave him a scale and asked him to weigh himself regularly  He is to call us with 3 pound weight gain  I encouraged adherence to salt restricted diet reviewed how to read food labels to facilitate adherence  Agreeable to nonfasting labs today  He wants us to call his friend Lisa Rawls if there are any medication changes        I have spent 25 minutes with Patient and family today in which greater than 50% of this time was spent in counseling/coordination of care regarding Diagnostic results, Instructions for management, Patient and family education, Importance of tx compliance, Risk factor reductions, Documenting in the medical record, Reviewing / ordering tests, medicine, procedures   and Obtaining or reviewing history    Assessment  Diagnoses and all orders for this visit:    Hospital discharge follow-up    Chronic combined systolic and diastolic HF (heart failure) ([facility] )  -     Basic metabolic panel; Future  -     Magnesium; Future    Primary hypertension    Pulmonary hypertension (HCC)    Mitral valve insufficiency, unspecified etiology    Suspected sleep apnea    Paroxysmal atrial fibrillation (HCC)  -     POCT ECG    Class 2 severe obesity due to excess calories with serious comorbidity in adult, unspecified BMI ([facility] )    BAHENA (dyspnea on exertion)  -     NT-BNP PRO; Future        Past Medical History:   Diagnosis Date   • Diabetes mellitus ([facility] )    • Hypertension        Review of Systems   Constitutional: Negative for chills  Cardiovascular: Positive for dyspnea on exertion and leg swelling   Negative for chest pain, claudication, cyanosis, irregular heartbeat, near-syncope, orthopnea, palpitations, paroxysmal nocturnal dyspnea and syncope  Respiratory: Positive for shortness of breath  Negative for cough  Gastrointestinal: Negative for heartburn and nausea  Neurological: Negative for dizziness, focal weakness, headaches, light-headedness and weakness  All other systems reviewed and are negative  Allergies   Allergen Reactions   • Penicillins Rash           Current Outpatient Medications:   •  aspirin (ECOTRIN LOW STRENGTH) 81 mg EC tablet, Take 81 mg by mouth daily, Disp: , Rfl:   •  carvedilol (COREG) 6 25 mg tablet, Take 1 tablet (6 25 mg total) by mouth 2 (two) times a day with meals, Disp: 60 tablet, Rfl: 0  •  Ergocalciferol (VITAMIN D2 PO), Take 1 25 mg by mouth, Disp: , Rfl:   •  furosemide (LASIX) 40 mg tablet, Take 1 tablet (40 mg total) by mouth 2 (two) times a day, Disp: 60 tablet, Rfl: 0  •  losartan (COZAAR) 100 MG tablet, Take 100 mg by mouth daily, Disp: , Rfl:   •  metFORMIN (GLUCOPHAGE) 1000 MG tablet, Take 1 tablet (1,000 mg total) by mouth 2 (two) times a day with meals Pt reports that he should be taking 1,000mg BID but takes total 2,000mg once a day, Disp: , Rfl: 0  •  potassium chloride (K-DUR,KLOR-CON) 20 mEq tablet, Take 1 tablet (20 mEq total) by mouth daily Do not start before May 10, 2023 , Disp: 30 tablet, Rfl: 0  •  rivaroxaban (Xarelto) 20 mg tablet, Take 1 tablet (20 mg total) by mouth daily with breakfast, Disp: 30 tablet, Rfl: 0  •  rosuvastatin (CRESTOR) 10 MG tablet, Take 10 mg by mouth daily, Disp: , Rfl:     Social History     Socioeconomic History   • Marital status: Single     Spouse name: Not on file   • Number of children: Not on file   • Years of education: Not on file   • Highest education level: Not on file   Occupational History   • Not on file   Tobacco Use   • Smoking status: Never   • Smokeless tobacco: Never   Substance and Sexual Activity   • Alcohol use: Not on file   • Drug use: Not on file   • Sexual activity: Not on file   Other Topics Concern   • Not on file   Social History Narrative   • Not on file     Social Determinants of Health     Financial Resource Strain: Not on file   Food Insecurity: Not on file   Transportation Needs: Not on file   Physical Activity: Not on file   Stress: Not on file   Social Connections: Not on file   Intimate Partner Violence: Not on file   Housing Stability: Not on file       History reviewed  No pertinent family history  Physical Exam  Vitals and nursing note reviewed  Constitutional:       General: He is not in acute distress  HENT:      Head: Normocephalic and atraumatic  Eyes:      Conjunctiva/sclera: Conjunctivae normal    Cardiovascular:      Rate and Rhythm: Normal rate  Rhythm irregularly irregular  Pulses: Intact distal pulses  Heart sounds: Normal heart sounds  Pulmonary:      Effort: Pulmonary effort is normal       Breath sounds: Normal breath sounds  Abdominal:      General: Bowel sounds are normal       Palpations: Abdomen is soft  Musculoskeletal:         General: Normal range of motion  Cervical back: Normal range of motion and neck supple  Right lower leg: Edema present  Left lower leg: Edema present  Skin:     General: Skin is warm and dry  Neurological:      Mental Status: He is alert and oriented to person, place, and time  Vitals: Blood pressure 138/72, pulse 67, height 6' (1 829 m), weight 112 kg (246 lb), SpO2 96 %  Wt Readings from Last 3 Encounters:   05/15/23 112 kg (246 lb)   05/09/23 113 kg (248 lb 14 4 oz)         Labs & Results:  Lab Results   Component Value Date    WBC 7 07 05/06/2023    HGB 15 4 05/06/2023    HCT 49 1 05/06/2023     (H) 05/06/2023     05/06/2023     BNP   Date Value Ref Range Status   05/06/2023 559 (H) 0 - 100 pg/mL Final     No components found for: CHEM    No results found for this or any previous visit      No results found for this or any previous visit  This note was completed in part utilizing m-modal fluency direct voice recognition software  Grammatical errors, random word insertion, spelling mistakes, and incomplete sentences may be an occasional consequence of the system secondary to software limitations, ambient noise and hardware issues  At the time of dictation, efforts were made to edit, clarify and /or correct errors  Please read the chart carefully and recognize, using context, where substitutions have occurred    If you have any questions or concerns about the context, text or information contained within the body of this dictation, please contact myself, the provider, for further clarification

## 2023-05-19 ENCOUNTER — TELEPHONE (OUTPATIENT)
Dept: CARDIOLOGY CLINIC | Facility: CLINIC | Age: 78
End: 2023-05-19

## 2023-05-19 NOTE — TELEPHONE ENCOUNTER
Jacque Sorto I saw you consulted him in the hospital and Dr Darwin Edwards hasn't seen him yet  Called his emergency contact and he will have the patient follow up with Kev Macario on 5/30 and then Dr Darwin Edwards   Thanks

## 2023-05-19 NOTE — TELEPHONE ENCOUNTER
Patient completed labs including BMP mag and BNP levels after office visit with Justin Kay on 5/15  His friend who is his  is calling to ask if there are any further instructions  Please advise Justin Kay is off today

## 2023-05-22 ENCOUNTER — TELEPHONE (OUTPATIENT)
Dept: CARDIOLOGY CLINIC | Facility: CLINIC | Age: 78
End: 2023-05-22

## 2023-05-22 NOTE — TELEPHONE ENCOUNTER
Spoke with pt's friend who interpreted for the pt  His wt is 236 lbs and stable  Denies any symptoms of CHF  Appetite improved  He feels good and is hungry  Is following a 2 gm sodium diet and fluid restriction  He is aware he has appt on 5/30 and 6/19/23 and friend will bring him  Diego Whitley Has no further questions  Advised I will call him back in 2 weeks for update  Verbally understood

## 2023-05-30 ENCOUNTER — OFFICE VISIT (OUTPATIENT)
Dept: CARDIOLOGY CLINIC | Facility: CLINIC | Age: 78
End: 2023-05-30

## 2023-05-30 VITALS
HEART RATE: 79 BPM | WEIGHT: 243 LBS | BODY MASS INDEX: 32.91 KG/M2 | DIASTOLIC BLOOD PRESSURE: 68 MMHG | HEIGHT: 72 IN | SYSTOLIC BLOOD PRESSURE: 124 MMHG

## 2023-05-30 DIAGNOSIS — I27.20 PULMONARY HYPERTENSION (HCC): ICD-10-CM

## 2023-05-30 DIAGNOSIS — R29.818 SUSPECTED SLEEP APNEA: ICD-10-CM

## 2023-05-30 DIAGNOSIS — I10 PRIMARY HYPERTENSION: ICD-10-CM

## 2023-05-30 DIAGNOSIS — E66.01 CLASS 2 SEVERE OBESITY DUE TO EXCESS CALORIES WITH SERIOUS COMORBIDITY IN ADULT, UNSPECIFIED BMI (HCC): ICD-10-CM

## 2023-05-30 DIAGNOSIS — I48.0 PAROXYSMAL ATRIAL FIBRILLATION (HCC): ICD-10-CM

## 2023-05-30 DIAGNOSIS — I50.42 CHRONIC COMBINED SYSTOLIC AND DIASTOLIC HF (HEART FAILURE) (HCC): Primary | ICD-10-CM

## 2023-05-30 DIAGNOSIS — I34.0 MITRAL VALVE INSUFFICIENCY, UNSPECIFIED ETIOLOGY: ICD-10-CM

## 2023-05-30 RX ORDER — ERGOCALCIFEROL 1.25 MG/1
50000 CAPSULE ORAL WEEKLY
COMMUNITY
Start: 2023-05-01

## 2023-05-30 NOTE — LETTER
May 30, 2023     Lisa Graves, [address] [address]    Patient: Ahmet King   YOB: 1945   Date of Visit: 5/30/2023       Dear Dr Beryle Solon: Thank you for referring Rogelio Garcia to me for evaluation  Below are my notes for this consultation  If you have questions, please do not hesitate to call me  I look forward to following your patient along with you  Sincerely,        RADHA Denise        CC: No Recipients    RADHA Denise  5/30/2023 11:00 AM  Sign when Signing Visit  Cardiology  Heart Failure   Follow Up Office Visit Note     Ahmet iKng   69 y o    male   MRN: [id number]  [facility] [address] [place] [address]  [phone]  [phone]    PCP: Lisa Graves MD  Cardiologist : will be Dr Celia Hodges      Staff-  call friend Rafal Rip with an updates on instructions-   friend who translates     HF adm; 5/6-5/9/23        Summary of Recommendations  Low-sodium diet, Heart failure education as below  he was provided a scale for home use  I suspect significant sodium dietary indiscretion  He lives by himself  He eats restaurant food frequently  He is improving in this area  Pt refuses to go to sleep medicine  Pharmacological nuclear stress test recommended  Pt refuses  Follow up will be scheduled with Dr Celia Hodges 6/19/23        Impression/plan  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, EF 50% with RV dysfunction and pulmonary hypertension, new onset adm 5/6-5/9/23  Echo-more focal inferior wall hypokinesis, tethering of the posterior leaflet with moderate to severe posteriorly directed MR   RV dilation, severe pulmonary hypertension with RVOT systolic notching notable as well,  Etiology unclear  Nuclear stress test recommended  The patient refuses    He understands he is at risk of MI, death  -Discharge weight 248 pounds  Wt Readings from Last 3 Encounters:   05/30/23 110 kg (243 lb)   05/15/23 112 kg (246 lb)   05/09/23 113 kg (248 lb 14 4 oz)   --beta-blocker:   Carvedilol 6 25 mg twice daily  --Diuretic:   Furosemide 40 mg twice daily, prior to admission was on 20 mg daily plus HCTZ  --ACE/ARB/ARNI:  losartan 100 mg/d  --MRA:   --SLGT2I  --ICD:   --2 g sodium diet, 1800 cc fluid restriction  Daily weights, call weight gain 2-3 lb in 1 day or 5 lb in 5 days  Hypertension, essential  BP   124/68  On carvedilol 6 25 mg twice daily, furosemide, losartan 100 mg daily  Mitral regurgitation  Atrial fibrillation, new onset  Rate control strategy  Placed on carvedilol 6 25 mg twice daily for rate control,   oral anticoagulation with Xarelto 20 mg daily  Sleep apnea suspected  Abnormal overnight pulse ox in the hospital desaturation into the 80s  Follow-up sleep medicine rec  Pt refuses  Hyperlipidemia  On rosuvastatin 10 mg daily  Calculated LDL 47, at goal   Latest Reference Range & Units 05/06/23 07:35   Cholesterol See Comment mg/dL 104   Triglycerides See Comment mg/dL 64   HDL >=40 mg/dL 44   LDL Calculated 0 - 100 mg/dL 47   Type 2 diabetes mellitus  Hemoglobin A1C 6 3   , on metformin  Cardiac testing  TTE 5/7/23  EF 50%  Wall thickness increased  Mild global hypokinesis  IVS: There is diastolic flattening of the interventricular septum consistent with right ventricle volume overload  Moderately dilated RV with moderately reduced function  Severe left atrial enlargement  Moderate ANNETTE  Mild AI  Moderate severe MR with a posterior directed jet  Severe TR, estimated PASP 70 mmHg  There is a left pleural effusion  Ascites is present  PASP severely increased  Notching of the RVOT Doppler waveform is noted  HPI:   Linda Juarez is a 70 yo [place] speaking male with a history of heart failure reduced ejection fraction  He had an echocardiogram at [facility] in 2008  He was placed on GDMT with carvedilol and losartan  There is no history of atrial fibrillation    For unclear reasons he was on clopidogrel  No prior cardiac history or PAD, stroke etc  History tobacco abuse    Home maintenance medications: Coreg 25 mg daily, hydrochlorothiazide 25 mg daily,  Cozaar 100 mg daily, Lasix 20 mg daily  Adm 5/6-5/9/23  CC: SOB, weight gain, PND, orthopnea  Symptoms progressive over several weeks  BAHENA with walking short distances  Sleep in a recliner  Dry weight unclear  Denied palpitations  Found to be volume overloaded; also found to be in A-fib  Elevated blood pressure 165/105  Presented on losartan and carvedilol 25 mg once daily  Echo: EF 50%, severe TR, pulmonary hypertension, moderate to severe MR  Followed by cardiology  Heart rates low particularly at night  Dose of carvedilol was decreased to 6 25 mg twice daily  Systemic anticoagulation recommended  Pulmonary pressures may well be overestimated in the presence of his current significant volume overload  Large-volume diuresis, considerable weight loss, improved symptoms  Per inpatient cardiology, he has a mild cardiomyopathy with more focal inferior wall hypokinesis, tethering of the posterior leaflet with moderate to severe posteriorly directed MR   RV dilation, severe pulmonary hypertension with RVOT systolic notching notable as well, in addition inferior Q waves on EKG-->outpatient stress Myoview  If this is abnormal, he would benefit from both the left and right heart cath  Found to be in new onset A-fib  Carvedilol was decreased from 25 daily to 6 25 twice daily due to slow ventricular response with pauses  Sleep apnea highly suspected and overnight pulse ox showed desaturation into the 80s  Referral made to sleep medicine for close follow-up  Discharge weight : 248 pounds  Discharge creatinine: 0 92  Discharge diuretics: Furosemide 40 mg twice daily      5/15/23  Hospital follow-up  He is accompanied by his friend [place] who translates    He denies chest pain or shortness of breath more than his usual he denies palpitations  He tells me he sleeps in a bed  He does have  chronic erythema of both lower extremities with some persistent edema  Compliant with his medications  He consumes a high salt diet  He eats at restaurants frequently  He lives alone  EKG rate controlled A-fib 67 bpm, left bundle  The patient declines stress test to evaluate his abnormal echo  He understands he is at risk for MI, death  He refused to follow-up with sleep medicine  I gave him a scale and asked him to weigh himself regularly  He is to call us with 3 pound weight gain  I encouraged adherence to salt restricted diet reviewed how to read food labels to facilitate adherence  Agreeable to nonfasting labs today  He wants us to call his friend Edwin Godo if there are any medication changes    Interval history  5/22/23 per staff: Spoke with pt's friend who interpreted for the pt  His wt is 236 lbs and stable  Denies any symptoms of CHF  Appetite improved  He feels good and is hungry  Is following a 2 gm sodium diet and fluid restriction  He is aware he has appt on 5/30 and 6/19/23 and friend will bring him  Rajesh Zhou Has no further questions  Advised I will call him back in 2 weeks for update  Verbally understood    5/30/23  Close follow up, HF  (PMH: 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, EF 50% with RV dysfunction and pulmonary hypertension, DM2, HTN, HL, PAF, MR, suspected PSA  Refuses stress test  Refuses sleep study)  acc by friend Edwin Good, who translates  Since last visit, he needed to walk a mile to get to his car, in [place]  With this activity he denied any chest pain  He had very mild dyspnea  He did fairly well  He denied palpitations  last labs 5/15/2023: Creatinine 1 0 BUN 35 potassium 5 0, magnesium 2 1  NT proBNP 416  H&H stable 15 4, 49 1  Platelet 954 K  Wt, down to 243 pounds  He is trying to choose low sodium foods at the restaurant  I recommend continuing the current plan     Follow-up with his cardiologist in a short interval   Emphasized importance of salt restricted diet  Provided samples of Xarelto  Continues to refuse both the sleep study and the stress test        I have spent 25 minutes with Patient and family today in which greater than 50% of this time was spent in counseling/coordination of care regarding Diagnostic results, Instructions for management, Patient and family education, Importance of tx compliance, Risk factor reductions, Documenting in the medical record, Reviewing / ordering tests, medicine, procedures   and Obtaining or reviewing history    Assessment  Diagnoses and all orders for this visit:    Chronic combined systolic and diastolic HF (heart failure) ([facility])    Paroxysmal atrial fibrillation ([facility] )    Primary hypertension    Mitral valve insufficiency, unspecified etiology    Pulmonary hypertension ([facility])    Class 2 severe obesity due to excess calories with serious comorbidity in adult, unspecified BMI ([facility] )    Suspected sleep apnea    Other orders  -     ergocalciferol (VITAMIN D2) 50,000 units; Take 50,000 Units by mouth once a week        Past Medical History:   Diagnosis Date   • Diabetes mellitus ([facility] )    • Hypertension        Review of Systems   Constitutional: Negative for chills  Cardiovascular: Negative for chest pain, claudication, cyanosis, dyspnea on exertion, irregular heartbeat, leg swelling, near-syncope, orthopnea, palpitations, paroxysmal nocturnal dyspnea and syncope  Respiratory: Negative for cough and shortness of breath  Gastrointestinal: Negative for heartburn and nausea  Neurological: Negative for dizziness, focal weakness, headaches, light-headedness and weakness  All other systems reviewed and are negative  Allergies   Allergen Reactions   • Penicillins Rash           Current Outpatient Medications:   •  aspirin (ECOTRIN LOW STRENGTH) 81 mg EC tablet, Take 81 mg by mouth daily, Disp: , Rfl:   •  carvedilol (COREG) 6 25 mg tablet, Take 1 tablet (6 25 mg total) by mouth 2 (two) times a day with meals, Disp: 60 tablet, Rfl: 0  •  ergocalciferol (VITAMIN D2) 50,000 units, Take 50,000 Units by mouth once a week, Disp: , Rfl:   •  furosemide (LASIX) 40 mg tablet, Take 1 tablet (40 mg total) by mouth 2 (two) times a day, Disp: 60 tablet, Rfl: 0  •  losartan (COZAAR) 100 MG tablet, Take 100 mg by mouth daily, Disp: , Rfl:   •  metFORMIN (GLUCOPHAGE) 1000 MG tablet, Take 1 tablet (1,000 mg total) by mouth 2 (two) times a day with meals Pt reports that he should be taking 1,000mg BID but takes total 2,000mg once a day, Disp: , Rfl: 0  •  potassium chloride (K-DUR,KLOR-CON) 20 mEq tablet, Take 1 tablet (20 mEq total) by mouth daily Do not start before May 10, 2023 , Disp: 30 tablet, Rfl: 0  •  rivaroxaban (Xarelto) 20 mg tablet, Take 1 tablet (20 mg total) by mouth daily with breakfast, Disp: 30 tablet, Rfl: 0  •  rosuvastatin (CRESTOR) 10 MG tablet, Take 10 mg by mouth daily, Disp: , Rfl:     Social History     Socioeconomic History   • Marital status: Single     Spouse name: Not on file   • Number of children: Not on file   • Years of education: Not on file   • Highest education level: Not on file   Occupational History   • Not on file   Tobacco Use   • Smoking status: Never   • Smokeless tobacco: Never   Substance and Sexual Activity   • Alcohol use: Not on file   • Drug use: Not on file   • Sexual activity: Not on file   Other Topics Concern   • Not on file   Social History Narrative   • Not on file     Social Determinants of Health     Financial Resource Strain: Not on file   Food Insecurity: Not on file   Transportation Needs: Not on file   Physical Activity: Not on file   Stress: Not on file   Social Connections: Not on file   Intimate Partner Violence: Not on file   Housing Stability: Not on file       History reviewed  No pertinent family history  Physical Exam  Vitals and nursing note reviewed     Constitutional:       General: He is not in acute "distress  HENT:      Head: Normocephalic and atraumatic  Eyes:      Conjunctiva/sclera: Conjunctivae normal    Cardiovascular:      Rate and Rhythm: Normal rate  Rhythm irregularly irregular  Pulses: Intact distal pulses  Heart sounds: Normal heart sounds  Pulmonary:      Effort: Pulmonary effort is normal       Breath sounds: Normal breath sounds  Abdominal:      General: Bowel sounds are normal       Palpations: Abdomen is soft  Musculoskeletal:         General: Normal range of motion  Cervical back: Normal range of motion and neck supple  Right lower leg: Edema present  Left lower leg: Edema present  Comments: Edema, improved   Skin:     General: Skin is warm and dry  Neurological:      Mental Status: He is alert and oriented to person, place, and time  Vitals: Blood pressure 124/68, pulse 79, height 6' (1 829 m), weight 110 kg (243 lb)  Wt Readings from Last 3 Encounters:   05/30/23 110 kg (243 lb)   05/15/23 112 kg (246 lb)   05/09/23 113 kg (248 lb 14 4 oz)         Labs & Results:  Lab Results   Component Value Date    HCT 49 1 05/06/2023    HGB 15 4 05/06/2023     (H) 05/06/2023     05/06/2023    WBC 7 07 05/06/2023     BNP   Date Value Ref Range Status   05/06/2023 559 (H) 0 - 100 pg/mL Final     No components found for: \"CHEM\"    No results found for this or any previous visit  No results found for this or any previous visit  This note was completed in part utilizing [employer]modal [employer] direct voice recognition software  Grammatical errors, random word insertion, spelling mistakes, and incomplete sentences may be an occasional consequence of the system secondary to software limitations, ambient noise and hardware issues  At the time of dictation, efforts were made to edit, clarify and /or correct errors  Please read the chart carefully and recognize, using context, where substitutions have occurred    If you have any questions or " concerns about the context, text or information contained within the body of this dictation, please contact myself, the provider, for further clarification

## 2023-05-30 NOTE — PROGRESS NOTES
Cardiology  Heart Failure   Follow Up Office Visit Note     Carlos Fuller   69 y o    male   MRN: [id number]  [address]  [address]  [address] [place] [id number] [phone]    PCP: Hernán Guerra MD  Cardiologist : will be Dr Drew Simons      Staff-  call friend rPosper Ivy with an updates on instructions-   friend who translates     HF adm; 5/6-5/9/23        Summary of Recommendations  Low-sodium diet, Heart failure education as below  he was provided a scale for home use  I suspect significant sodium dietary indiscretion  He lives by himself  He eats restaurant food frequently  He is improving in this area  Pt refuses to go to sleep medicine  Pharmacological nuclear stress test recommended  Pt refuses  Follow up will be scheduled with Dr Drew Simons 6/19/23        Impression/plan  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, EF 50% with RV dysfunction and pulmonary hypertension, new onset adm 5/6-5/9/23  · Echo-more focal inferior wall hypokinesis, tethering of the posterior leaflet with moderate to severe posteriorly directed MR   RV dilation, severe pulmonary hypertension with RVOT systolic notching notable as well,  · Etiology unclear  Nuclear stress test recommended  The patient refuses  He understands he is at risk of MI, death  -Discharge weight 248 pounds  Wt Readings from Last 3 Encounters:   05/30/23 110 kg (243 lb)   05/15/23 112 kg (246 lb)   05/09/23 113 kg (248 lb 14 4 oz)   --beta-blocker:   Carvedilol 6 25 mg twice daily  --Diuretic:   Furosemide 40 mg twice daily, prior to admission was on 20 mg daily plus HCTZ  --ACE/ARB/ARNI:  losartan 100 mg/d  --MRA:   --SLGT2I  --ICD:   --2 g sodium diet, 1800 cc fluid restriction   Daily weights, call weight gain 2-3 lb in 1 day or 5 lb in 5 days  Hypertension, essential  BP   124/68  On carvedilol 6 25 mg twice daily, furosemide, losartan 100 mg daily  Mitral regurgitation  Atrial fibrillation, new onset  Rate control strategy  · Placed on carvedilol 6 25 mg twice daily for rate control,   · oral anticoagulation with Xarelto 20 mg daily  Sleep apnea suspected  Abnormal overnight pulse ox in the hospital desaturation into the 80s  Follow-up sleep medicine rec  Pt refuses  Hyperlipidemia  On rosuvastatin 10 mg daily  Calculated LDL 47, at goal   Latest Reference Range & Units 05/06/23 07:35   Cholesterol See Comment mg/dL 104   Triglycerides See Comment mg/dL 64   HDL >=40 mg/dL 44   LDL Calculated 0 - 100 mg/dL 47   Type 2 diabetes mellitus  Hemoglobin A1C 6 3   , on metformin  Cardiac testing  • TTE 5/7/23  EF 50%  Wall thickness increased  Mild global hypokinesis  IVS: There is diastolic flattening of the interventricular septum consistent with right ventricle volume overload  Moderately dilated RV with moderately reduced function  Severe left atrial enlargement  Moderate ANNETTE  Mild AI  Moderate severe MR with a posterior directed jet  Severe TR, estimated PASP 70 mmHg  There is a left pleural effusion  Ascites is present  PASP severely increased  Notching of the RVOT Doppler waveform is noted                HPI:   Mahnaz Nuno is a 70 yo [place] speaking male with a history of heart failure reduced ejection fraction  He had an echocardiogram at [facility] in 2008  He was placed on GDMT with carvedilol and losartan  There is no history of atrial fibrillation  For unclear reasons he was on clopidogrel  No prior cardiac history or PAD, stroke etc  History tobacco abuse    Home maintenance medications: Coreg 25 mg daily, hydrochlorothiazide 25 mg daily,  Cozaar 100 mg daily, Lasix 20 mg daily  Adm 5/6-5/9/23  CC: SOB, weight gain, PND, orthopnea  Symptoms progressive over several weeks  BAHENA with walking short distances  Sleep in a recliner  Dry weight unclear  Denied palpitations  Found to be volume overloaded; also found to be in A-fib    Elevated blood pressure 165/105  Presented on losartan and carvedilol 25 mg once daily  Echo: EF 50%, severe TR, pulmonary hypertension, moderate to severe MR  Followed by cardiology  Heart rates low particularly at night  Dose of carvedilol was decreased to 6 25 mg twice daily  Systemic anticoagulation recommended  Pulmonary pressures may well be overestimated in the presence of his current significant volume overload  Large-volume diuresis, considerable weight loss, improved symptoms  Per inpatient cardiology, he has a mild cardiomyopathy with more focal inferior wall hypokinesis, tethering of the posterior leaflet with moderate to severe posteriorly directed MR   RV dilation, severe pulmonary hypertension with RVOT systolic notching notable as well, in addition inferior Q waves on EKG-->outpatient stress Myoview  If this is abnormal, he would benefit from both the left and right heart cath  Found to be in new onset A-fib  Carvedilol was decreased from 25 daily to 6 25 twice daily due to slow ventricular response with pauses  Sleep apnea highly suspected and overnight pulse ox showed desaturation into the 80s  Referral made to sleep medicine for close follow-up  Discharge weight : 248 pounds  Discharge creatinine: 0 92  Discharge diuretics: Furosemide 40 mg twice daily      5/15/23  Hospital follow-up  He is accompanied by his friend [place] who translates  He denies chest pain or shortness of breath more than his usual he denies palpitations  He tells me he sleeps in a bed  He does have  chronic erythema of both lower extremities with some persistent edema  Compliant with his medications  He consumes a high salt diet  He eats at restaurants frequently  He lives alone  EKG rate controlled A-fib 67 bpm, left bundle  The patient declines stress test to evaluate his abnormal echo  He understands he is at risk for MI, death  He refused to follow-up with sleep medicine  I gave him a scale and asked him to weigh himself regularly    He is to call us with 3 pound weight gain  I encouraged adherence to salt restricted diet reviewed how to read food labels to facilitate adherence  Agreeable to nonfasting labs today  He wants us to call his friend Karly Krishna if there are any medication changes    Interval history  5/22/23 per staff: Spoke with pt's friend who interpreted for the pt  His wt is 236 lbs and stable  Denies any symptoms of CHF  Appetite improved  He feels good and is hungry  Is following a 2 gm sodium diet and fluid restriction      He is aware he has appt on 5/30 and 6/19/23 and friend will bring him  Darcus Gwendolyn Has no further questions  Advised I will call him back in 2 weeks for update  Verbally understood    5/30/23  Close follow up, HF  (PMH: 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, EF 50% with RV dysfunction and pulmonary hypertension, DM2, HTN, HL, PAF, MR, suspected PSA  Refuses stress test  Refuses sleep study)  acc by friend Karly Krishna, who translates  Since last visit, he needed to walk a mile to get to his car, in [place]  With this activity he denied any chest pain  He had very mild dyspnea  He did fairly well  He denied palpitations  last labs 5/15/2023: Creatinine 1 0 BUN 35 potassium 5 0, magnesium 2 1  NT proBNP 416  H&H stable 15 4, 49 1  Platelet 081 K  Wt, down to 243 pounds  He is trying to choose low sodium foods at the restaurant  I recommend continuing the current plan     Follow-up with his cardiologist in a short interval   Emphasized importance of salt restricted diet  Provided samples of Xarelto  Continues to refuse both the sleep study and the stress test        I have spent 25 minutes with Patient and family today in which greater than 50% of this time was spent in counseling/coordination of care regarding Diagnostic results, Instructions for management, Patient and family education, Importance of tx compliance, Risk factor reductions, Documenting in the medical record, Reviewing / ordering tests, medicine, procedures   and Obtaining or reviewing history    Assessment  Diagnoses and all orders for this visit:    Chronic combined systolic and diastolic HF (heart failure) (HCC)    Paroxysmal atrial fibrillation ([facility] )    Primary hypertension    Mitral valve insufficiency, unspecified etiology    Pulmonary hypertension (HCC)    Class 2 severe obesity due to excess calories with serious comorbidity in adult, unspecified BMI ([facility] )    Suspected sleep apnea    Other orders  -     ergocalciferol (VITAMIN D2) 50,000 units; Take 50,000 Units by mouth once a week        Past Medical History:   Diagnosis Date   • Diabetes mellitus ([facility] )    • Hypertension        Review of Systems   Constitutional: Negative for chills  Cardiovascular: Negative for chest pain, claudication, cyanosis, dyspnea on exertion, irregular heartbeat, leg swelling, near-syncope, orthopnea, palpitations, paroxysmal nocturnal dyspnea and syncope  Respiratory: Negative for cough and shortness of breath  Gastrointestinal: Negative for heartburn and nausea  Neurological: Negative for dizziness, focal weakness, headaches, light-headedness and weakness  All other systems reviewed and are negative  Allergies   Allergen Reactions   • Penicillins Rash           Current Outpatient Medications:   •  aspirin (ECOTRIN LOW STRENGTH) 81 mg EC tablet, Take 81 mg by mouth daily, Disp: , Rfl:   •  carvedilol (COREG) 6 25 mg tablet, Take 1 tablet (6 25 mg total) by mouth 2 (two) times a day with meals, Disp: 60 tablet, Rfl: 0  •  ergocalciferol (VITAMIN D2) 50,000 units, Take 50,000 Units by mouth once a week, Disp: , Rfl:   •  furosemide (LASIX) 40 mg tablet, Take 1 tablet (40 mg total) by mouth 2 (two) times a day, Disp: 60 tablet, Rfl: 0  •  losartan (COZAAR) 100 MG tablet, Take 100 mg by mouth daily, Disp: , Rfl:   •  metFORMIN (GLUCOPHAGE) 1000 MG tablet, Take 1 tablet (1,000 mg total) by mouth 2 (two) times a day with meals Pt reports that he should be taking 1,000mg BID but takes total 2,000mg once a day, Disp: , Rfl: 0  •  potassium chloride (K-DUR,KLOR-CON) 20 mEq tablet, Take 1 tablet (20 mEq total) by mouth daily Do not start before May 10, 2023 , Disp: 30 tablet, Rfl: 0  •  rivaroxaban (Xarelto) 20 mg tablet, Take 1 tablet (20 mg total) by mouth daily with breakfast, Disp: 30 tablet, Rfl: 0  •  rosuvastatin (CRESTOR) 10 MG tablet, Take 10 mg by mouth daily, Disp: , Rfl:     Social History     Socioeconomic History   • Marital status: Single     Spouse name: Not on file   • Number of children: Not on file   • Years of education: Not on file   • Highest education level: Not on file   Occupational History   • Not on file   Tobacco Use   • Smoking status: Never   • Smokeless tobacco: Never   Substance and Sexual Activity   • Alcohol use: Not on file   • Drug use: Not on file   • Sexual activity: Not on file   Other Topics Concern   • Not on file   Social History Narrative   • Not on file     Social Determinants of Health     Financial Resource Strain: Not on file   Food Insecurity: Not on file   Transportation Needs: Not on file   Physical Activity: Not on file   Stress: Not on file   Social Connections: Not on file   Intimate Partner Violence: Not on file   Housing Stability: Not on file       History reviewed  No pertinent family history  Physical Exam  Vitals and nursing note reviewed  Constitutional:       General: He is not in acute distress  HENT:      Head: Normocephalic and atraumatic  Eyes:      Conjunctiva/sclera: Conjunctivae normal    Cardiovascular:      Rate and Rhythm: Normal rate  Rhythm irregularly irregular  Pulses: Intact distal pulses  Heart sounds: Normal heart sounds  Pulmonary:      Effort: Pulmonary effort is normal       Breath sounds: Normal breath sounds  Abdominal:      General: Bowel sounds are normal       Palpations: Abdomen is soft  Musculoskeletal:         General: Normal range of motion        Cervical back: "Normal range of motion and neck supple  Right lower leg: Edema present  Left lower leg: Edema present  Comments: Edema, improved   Skin:     General: Skin is warm and dry  Neurological:      Mental Status: He is alert and oriented to person, place, and time  Vitals: Blood pressure 124/68, pulse 79, height 6' (1 829 m), weight 110 kg (243 lb)  Wt Readings from Last 3 Encounters:   05/30/23 110 kg (243 lb)   05/15/23 112 kg (246 lb)   05/09/23 113 kg (248 lb 14 4 oz)         Labs & Results:  Lab Results   Component Value Date    HCT 49 1 05/06/2023    HGB 15 4 05/06/2023     (H) 05/06/2023     05/06/2023    WBC 7 07 05/06/2023     BNP   Date Value Ref Range Status   05/06/2023 559 (H) 0 - 100 pg/mL Final     No components found for: \"CHEM\"    No results found for this or any previous visit  No results found for this or any previous visit  This note was completed in part utilizing S[employer] fluency direct voice recognition software  Grammatical errors, random word insertion, spelling mistakes, and incomplete sentences may be an occasional consequence of the system secondary to software limitations, ambient noise and hardware issues  At the time of dictation, efforts were made to edit, clarify and /or correct errors  Please read the chart carefully and recognize, using context, where substitutions have occurred    If you have any questions or concerns about the context, text or information contained within the body of this dictation, please contact myself, the provider, for further clarification      "

## 2023-05-30 NOTE — LETTER
May 30, 2023     Norma Loya, [address] [address]    Patient: Codi Gage   YOB: 1945   Date of Visit: 5/30/2023       Dear Dr Rebel Guaman: Thank you for referring Audrey Kumar to me for evaluation  Below are my notes for this consultation  If you have questions, please do not hesitate to call me  I look forward to following your patient along with you  Sincerely,        RADHA Mcdowell        CC: MD Alma Rosa Bennett, [facility]  5/30/2023 10:58 AM  Incomplete  Cardiology  Heart Failure   Follow Up Office Visit Note     Codi Gage   69 y o    male   MRN: [id number]  [facility] [address] [address] [phone]    PCP: Norma Loya MD  Cardiologist : will be Dr Naomi Riley      Staff-  call friend Kyleaashish Eamon with an updates on instructions-   friend who translates     HF adm; 5/6-5/9/23        Summary of Recommendations  Low-sodium diet, Heart failure education as below  he was provided a scale for home use  I suspect significant sodium dietary indiscretion  He lives by himself  He eats restaurant food frequently  He is improving in this area  Pt refuses to go to sleep medicine  Pharmacological nuclear stress test recommended  Pt refuses  Follow up will be scheduled with Dr Naomi Riley 6/19/23        Impression/plan  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, EF 50% with RV dysfunction and pulmonary hypertension, new onset adm 5/6-5/9/23  Echo-more focal inferior wall hypokinesis, tethering of the posterior leaflet with moderate to severe posteriorly directed MR   RV dilation, severe pulmonary hypertension with RVOT systolic notching notable as well,  Etiology unclear  Nuclear stress test recommended  The patient refuses    He understands he is at risk of MI, death  -Discharge weight 248 pounds  Wt Readings from Last 3 Encounters:   05/30/23 110 kg (243 lb) 05/15/23 112 kg (246 lb)   05/09/23 113 kg (248 lb 14 4 oz)   --beta-blocker:   Carvedilol 6 25 mg twice daily  --Diuretic:   Furosemide 40 mg twice daily, prior to admission was on 20 mg daily plus HCTZ  --ACE/ARB/ARNI:  losartan 100 mg/d  --MRA:   --SLGT2I  --ICD:   --2 g sodium diet, 1800 cc fluid restriction  Daily weights, call weight gain 2-3 lb in 1 day or 5 lb in 5 days  Hypertension, essential  BP   124/68  On carvedilol 6 25 mg twice daily, furosemide, losartan 100 mg daily  Mitral regurgitation  Atrial fibrillation, new onset  Rate control strategy  Placed on carvedilol 6 25 mg twice daily for rate control,   oral anticoagulation with Xarelto 20 mg daily  Sleep apnea suspected  Abnormal overnight pulse ox in the hospital desaturation into the 80s  Follow-up sleep medicine rec  Pt refuses  Hyperlipidemia  On rosuvastatin 10 mg daily  Calculated LDL 47, at goal   Latest Reference Range & Units 05/06/23 07:35   Cholesterol See Comment mg/dL 104   Triglycerides See Comment mg/dL 64   HDL >=40 mg/dL 44   LDL Calculated 0 - 100 mg/dL 47   Type 2 diabetes mellitus  Hemoglobin A1C 6 3   , on metformin  Cardiac testing  TTE 5/7/23  EF 50%  Wall thickness increased  Mild global hypokinesis  IVS: There is diastolic flattening of the interventricular septum consistent with right ventricle volume overload  Moderately dilated RV with moderately reduced function  Severe left atrial enlargement  Moderate ANNETTE  Mild AI  Moderate severe MR with a posterior directed jet  Severe TR, estimated PASP 70 mmHg  There is a left pleural effusion  Ascites is present  PASP severely increased  Notching of the RVOT Doppler waveform is noted  HPI:   Zelalem Elena is a 70 yo [place] speaking male with a history of heart failure reduced ejection fraction  He had an echocardiogram at [facility] in 2008  He was placed on GDMT with carvedilol and losartan  There is no history of atrial fibrillation    For unclear reasons he was on clopidogrel  No prior cardiac history or PAD, stroke etc  History tobacco abuse    Home maintenance medications: Coreg 25 mg daily, hydrochlorothiazide 25 mg daily,  Cozaar 100 mg daily, Lasix 20 mg daily  Adm 5/6-5/9/23  CC: SOB, weight gain, PND, orthopnea  Symptoms progressive over several weeks  BAHENA with walking short distances  Sleep in a recliner  Dry weight unclear  Denied palpitations  Found to be volume overloaded; also found to be in A-fib  Elevated blood pressure 165/105  Presented on losartan and carvedilol 25 mg once daily  Echo: EF 50%, severe TR, pulmonary hypertension, moderate to severe MR  Followed by cardiology  Heart rates low particularly at night  Dose of carvedilol was decreased to 6 25 mg twice daily  Systemic anticoagulation recommended  Pulmonary pressures may well be overestimated in the presence of his current significant volume overload  Large-volume diuresis, considerable weight loss, improved symptoms  Per inpatient cardiology, he has a mild cardiomyopathy with more focal inferior wall hypokinesis, tethering of the posterior leaflet with moderate to severe posteriorly directed MR   RV dilation, severe pulmonary hypertension with RVOT systolic notching notable as well, in addition inferior Q waves on EKG-->outpatient stress Myoview  If this is abnormal, he would benefit from both the left and right heart cath  Found to be in new onset A-fib  Carvedilol was decreased from 25 daily to 6 25 twice daily due to slow ventricular response with pauses  Sleep apnea highly suspected and overnight pulse ox showed desaturation into the 80s  Referral made to sleep medicine for close follow-up  Discharge weight : 248 pounds  Discharge creatinine: 0 92  Discharge diuretics: Furosemide 40 mg twice daily      5/15/23  Hospital follow-up  He is accompanied by his friend [place] who translates    He denies chest pain or shortness of breath more than his usual he denies palpitations  He tells me he sleeps in a bed  He does have  chronic erythema of both lower extremities with some persistent edema  Compliant with his medications  He consumes a high salt diet  He eats at restaurants frequently  He lives alone  EKG rate controlled A-fib 67 bpm, left bundle  The patient declines stress test to evaluate his abnormal echo  He understands he is at risk for MI, death  He refused to follow-up with sleep medicine  I gave him a scale and asked him to weigh himself regularly  He is to call us with 3 pound weight gain  I encouraged adherence to salt restricted diet reviewed how to read food labels to facilitate adherence  Agreeable to nonfasting labs today  He wants us to call his friend Sunny Mitchell if there are any medication changes    Interval history  5/22/23 per staff: Spoke with pt's friend who interpreted for the pt  His wt is 236 lbs and stable  Denies any symptoms of CHF  Appetite improved  He feels good and is hungry  Is following a 2 gm sodium diet and fluid restriction  He is aware he has appt on 5/30 and 6/19/23 and friend will bring him  Margaret Gaitan Has no further questions  Advised I will call him back in 2 weeks for update  Verbally understood    5/30/23  Close follow up, HF  (PMH: 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, EF 50% with RV dysfunction and pulmonary hypertension, DM2, HTN, HL, PAF, MR, suspected PSA  Refuses stress test  Refuses sleep study)  acc by friend Sunny Mitchell, who translates  Since last visit, he needed to walk a mile to get to his car, in [place]  With this activity he denied any chest pain  He had very mild dyspnea  He did fairly well  He denied palpitations  last labs 5/15/2023: Creatinine 1 0 BUN 35 potassium 5 0, magnesium 2 1  NT proBNP 416  H&H stable 15 4, 49 1  Platelet 776 K  Wt, down to 243 pounds  He is trying to choose low sodium foods at the restaurant  I recommend continuing the current plan     Follow-up with his cardiologist in a short interval   Emphasized importance of salt restricted diet  Provided samples of Xarelto  Continues to refuse both the sleep study and the stress test        I have spent 25 minutes with Patient and family today in which greater than 50% of this time was spent in counseling/coordination of care regarding Diagnostic results, Instructions for management, Patient and family education, Importance of tx compliance, Risk factor reductions, Documenting in the medical record, Reviewing / ordering tests, medicine, procedures   and Obtaining or reviewing history    Assessment  Diagnoses and all orders for this visit:    Chronic combined systolic and diastolic HF (heart failure) ([facility])    Paroxysmal atrial fibrillation ([facility] )    Primary hypertension    Mitral valve insufficiency, unspecified etiology    Pulmonary hypertension ([facility])    Class 2 severe obesity due to excess calories with serious comorbidity in adult, unspecified BMI ([facility] )    Suspected sleep apnea    Other orders  -     ergocalciferol (VITAMIN D2) 50,000 units; Take 50,000 Units by mouth once a week        Past Medical History:   Diagnosis Date   • Diabetes mellitus ([facility] )    • Hypertension        Review of Systems   Constitutional: Negative for chills  Cardiovascular: Negative for chest pain, claudication, cyanosis, dyspnea on exertion, irregular heartbeat, leg swelling, near-syncope, orthopnea, palpitations, paroxysmal nocturnal dyspnea and syncope  Respiratory: Negative for cough and shortness of breath  Gastrointestinal: Negative for heartburn and nausea  Neurological: Negative for dizziness, focal weakness, headaches, light-headedness and weakness  All other systems reviewed and are negative  Allergies   Allergen Reactions   • Penicillins Rash           Current Outpatient Medications:   •  aspirin (ECOTRIN LOW STRENGTH) 81 mg EC tablet, Take 81 mg by mouth daily, Disp: , Rfl:   •  carvedilol (COREG) 6 25 mg tablet, Take 1 tablet (6 25 mg total) by mouth 2 (two) times a day with meals, Disp: 60 tablet, Rfl: 0  •  ergocalciferol (VITAMIN D2) 50,000 units, Take 50,000 Units by mouth once a week, Disp: , Rfl:   •  furosemide (LASIX) 40 mg tablet, Take 1 tablet (40 mg total) by mouth 2 (two) times a day, Disp: 60 tablet, Rfl: 0  •  losartan (COZAAR) 100 MG tablet, Take 100 mg by mouth daily, Disp: , Rfl:   •  metFORMIN (GLUCOPHAGE) 1000 MG tablet, Take 1 tablet (1,000 mg total) by mouth 2 (two) times a day with meals Pt reports that he should be taking 1,000mg BID but takes total 2,000mg once a day, Disp: , Rfl: 0  •  potassium chloride (K-DUR,KLOR-CON) 20 mEq tablet, Take 1 tablet (20 mEq total) by mouth daily Do not start before May 10, 2023 , Disp: 30 tablet, Rfl: 0  •  rivaroxaban (Xarelto) 20 mg tablet, Take 1 tablet (20 mg total) by mouth daily with breakfast, Disp: 30 tablet, Rfl: 0  •  rosuvastatin (CRESTOR) 10 MG tablet, Take 10 mg by mouth daily, Disp: , Rfl:     Social History     Socioeconomic History   • Marital status: Single     Spouse name: Not on file   • Number of children: Not on file   • Years of education: Not on file   • Highest education level: Not on file   Occupational History   • Not on file   Tobacco Use   • Smoking status: Never   • Smokeless tobacco: Never   Substance and Sexual Activity   • Alcohol use: Not on file   • Drug use: Not on file   • Sexual activity: Not on file   Other Topics Concern   • Not on file   Social History Narrative   • Not on file     Social Determinants of Health     Financial Resource Strain: Not on file   Food Insecurity: Not on file   Transportation Needs: Not on file   Physical Activity: Not on file   Stress: Not on file   Social Connections: Not on file   Intimate Partner Violence: Not on file   Housing Stability: Not on file       History reviewed  No pertinent family history  Physical Exam  Vitals and nursing note reviewed     Constitutional:       General: He is not in acute "distress  HENT:      Head: Normocephalic and atraumatic  Eyes:      Conjunctiva/sclera: Conjunctivae normal    Cardiovascular:      Rate and Rhythm: Normal rate  Rhythm irregularly irregular  Pulses: Intact distal pulses  Heart sounds: Normal heart sounds  Pulmonary:      Effort: Pulmonary effort is normal       Breath sounds: Normal breath sounds  Abdominal:      General: Bowel sounds are normal       Palpations: Abdomen is soft  Musculoskeletal:         General: Normal range of motion  Cervical back: Normal range of motion and neck supple  Right lower leg: Edema present  Left lower leg: Edema present  Comments: Edema, improved   Skin:     General: Skin is warm and dry  Neurological:      Mental Status: He is alert and oriented to person, place, and time  Vitals: Blood pressure 124/68, pulse 79, height 6' (1 829 m), weight 110 kg (243 lb)  Wt Readings from Last 3 Encounters:   05/30/23 110 kg (243 lb)   05/15/23 112 kg (246 lb)   05/09/23 113 kg (248 lb 14 4 oz)         Labs & Results:  Lab Results   Component Value Date    HCT 49 1 05/06/2023    HGB 15 4 05/06/2023     (H) 05/06/2023     05/06/2023    WBC 7 07 05/06/2023     BNP   Date Value Ref Range Status   05/06/2023 559 (H) 0 - 100 pg/mL Final     No components found for: \"CHEM\"    No results found for this or any previous visit  No results found for this or any previous visit  This note was completed in part utilizing m-modal [employer] direct voice recognition software  Grammatical errors, random word insertion, spelling mistakes, and incomplete sentences may be an occasional consequence of the system secondary to software limitations, ambient noise and hardware issues  At the time of dictation, efforts were made to edit, clarify and /or correct errors  Please read the chart carefully and recognize, using context, where substitutions have occurred    If you have any questions or " concerns about the context, text or information contained within the body of this dictation, please contact myself, the provider, for further clarification        RADHA Pandey  5/30/2023 10:45 AM  Sign when Signing Visit  Cardiology  Heart Failure   Follow Up Office Visit Note     Stanford Brownlee   69 y o    male   MRN: [id number]  [address]  [address] [place] [id number]    PCP: Savita Bhat MD  Cardiologist : will be Dr Scottie Lundborg      Staff-  call friend [place] with an updates on instructions-   friend who translates     HF adm; 5/6-5/9/23        Summary of Recommendations  Low-sodium diet, Heart failure education as below  he was provided a scale for home use  I suspect significant sodium dietary indiscretion  He lives by himself  He eats restaurant food frequently  Pt refuses to go to sleep medicine  Pharmacological nuclear stress test recommended  Pt refuses  Follow up will be scheduled with Dr Scottie Lundborg 6/19/23        Impression/plan  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, EF 50% with RV dysfunction and pulmonary hypertension, new onset adm 5/6-5/9/23  Echo-more focal inferior wall hypokinesis, tethering of the posterior leaflet with moderate to severe posteriorly directed MR   RV dilation, severe pulmonary hypertension with RVOT systolic notching notable as well,  Etiology unclear  Nuclear stress test recommended  The patient refuses  He understands he is at risk of MI, death  -Discharge weight 248 pounds  Wt Readings from Last 3 Encounters:   05/15/23 112 kg (246 lb)   05/09/23 113 kg (248 lb 14 4 oz)   --beta-blocker:   Carvedilol 6 25 mg twice daily  --Diuretic:   Furosemide 40 mg twice daily, prior to admission was on 20 mg daily plus HCTZ  --ACE/ARB/ARNI:  losartan 100 mg/d  --MRA:   --SLGT2I  --ICD:   --2 g sodium diet, 1800 cc fluid restriction   Daily weights, call weight gain 2-3 lb in 1 day or 5 lb in 5 days  Hypertension, essential  BP  *** On carvedilol 6 25 mg twice daily, furosemide, losartan 100 mg daily  Mitral regurgitation  Atrial fibrillation, new onset  Rate control strategy  Placed on carvedilol 6 25 mg twice daily for rate control,   oral anticoagulation with Xarelto 20 mg daily  Sleep apnea suspected  Abnormal overnight pulse ox in the hospital desaturation into the 80s  Follow-up sleep medicine rec  Pt refuses  Hyperlipidemia  On rosuvastatin 10 mg daily  Calculated LDL 47, at goal   Latest Reference Range & Units 05/06/23 07:35   Cholesterol See Comment mg/dL 104   Triglycerides See Comment mg/dL 64   HDL >=40 mg/dL 44   LDL Calculated 0 - 100 mg/dL 47   Type 2 diabetes mellitus  Hemoglobin A1C 6 3   , on metformin  Cardiac testing  TTE 5/7/23  EF 50%  Wall thickness increased  Mild global hypokinesis  IVS: There is diastolic flattening of the interventricular septum consistent with right ventricle volume overload  Moderately dilated RV with moderately reduced function  Severe left atrial enlargement  Moderate ANNETTE  Mild AI  Moderate severe MR with a posterior directed jet  Severe TR, estimated PASP 70 mmHg  There is a left pleural effusion  Ascites is present  PASP severely increased  Notching of the RVOT Doppler waveform is noted  HPI:   Lolly Bowens is a 68 yo [place] speaking male with a history of heart failure reduced ejection fraction  He had an echocardiogram at [facility] in 2008  He was placed on GDMT with carvedilol and losartan  There is no history of atrial fibrillation  For unclear reasons he was on clopidogrel  No prior cardiac history or PAD, stroke etc  History tobacco abuse    Home maintenance medications: Coreg 25 mg daily, hydrochlorothiazide 25 mg daily,  Cozaar 100 mg daily, Lasix 20 mg daily  Adm 5/6-5/9/23  CC: SOB, weight gain, PND, orthopnea  Symptoms progressive over several weeks  BAHENA with walking short distances    Sleep in a recliner  Dry weight unclear  Denied palpitations  Found to be volume overloaded; also found to be in A-fib  Elevated blood pressure 165/105  Presented on losartan and carvedilol 25 mg once daily  Echo: EF 50%, severe TR, pulmonary hypertension, moderate to severe MR  Followed by cardiology  Heart rates low particularly at night  Dose of carvedilol was decreased to 6 25 mg twice daily  Systemic anticoagulation recommended  Pulmonary pressures may well be overestimated in the presence of his current significant volume overload  Large-volume diuresis, considerable weight loss, improved symptoms  Per inpatient cardiology, he has a mild cardiomyopathy with more focal inferior wall hypokinesis, tethering of the posterior leaflet with moderate to severe posteriorly directed MR   RV dilation, severe pulmonary hypertension with RVOT systolic notching notable as well, in addition inferior Q waves on EKG-->outpatient stress Myoview  If this is abnormal, he would benefit from both the left and right heart cath  Found to be in new onset A-fib  Carvedilol was decreased from 25 daily to 6 25 twice daily due to slow ventricular response with pauses  Sleep apnea highly suspected and overnight pulse ox showed desaturation into the 80s  Referral made to sleep medicine for close follow-up  Discharge weight : 248 pounds  Discharge creatinine: 0 92  Discharge diuretics: Furosemide 40 mg twice daily      5/15/23  Hospital follow-up  He is accompanied by his friend [place] who translates  He denies chest pain or shortness of breath more than his usual he denies palpitations  He tells me he sleeps in a bed  He does have  chronic erythema of both lower extremities with some persistent edema  Compliant with his medications  He consumes a high salt diet  He eats at restaurants frequently  He lives alone  EKG rate controlled A-fib 67 bpm, left bundle  The patient declines stress test to evaluate his abnormal echo    He understands he is at risk for MI, death  He refused to follow-up with sleep medicine  I gave him a scale and asked him to weigh himself regularly  He is to call us with 3 pound weight gain  I encouraged adherence to salt restricted diet reviewed how to read food labels to facilitate adherence  Agreeable to nonfasting labs today  He wants us to call his friend Jim Miner if there are any medication changes    Interval history  5/22/23 per staff: Spoke with pt's friend who interpreted for the pt  His wt is 236 lbs and stable  Denies any symptoms of CHF  Appetite improved  He feels good and is hungry  Is following a 2 gm sodium diet and fluid restriction  He is aware he has appt on 5/30 and 6/19/23 and friend will bring him  Maia Granda Has no further questions  Advised I will call him back in 2 weeks for update  Verbally understood    5/30/23  Close follow up, HF  (PMH: 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, EF 50% with RV dysfunction and pulmonary hypertension, DM2, HTN, HL, PAF, MR, suspected PSA  Refuses stress test  Refuses sleep study)  acc by friend Jim Miner, who translates  last labs 5/15/2023: Creatinine 1 0 BUN 35 potassium 5 0, magnesium 2 1  NT proBNP 416  Wt  ROS:                        I have spent 25 minutes with Patient and family today in which greater than 50% of this time was spent in counseling/coordination of care regarding Diagnostic results, Instructions for management, Patient and family education, Importance of tx compliance, Risk factor reductions, Documenting in the medical record, Reviewing / ordering tests, medicine, procedures   and Obtaining or reviewing history      Assessment  Diagnoses and all orders for this visit:    Chronic combined systolic and diastolic HF (heart failure) (HCC)    Paroxysmal atrial fibrillation ([facility] )    Primary hypertension    Mitral valve insufficiency, unspecified etiology    Pulmonary hypertension ([facility] )    Class 2 severe obesity due to excess calories with serious comorbidity in adult, unspecified BMI ([facility] )    Suspected sleep apnea        Past Medical History:   Diagnosis Date   • Diabetes mellitus ([facility] )    • Hypertension        Review of Systems   Constitutional: Negative for chills  Cardiovascular: Positive for dyspnea on exertion and leg swelling  Negative for chest pain, claudication, cyanosis, irregular heartbeat, near-syncope, orthopnea, palpitations, paroxysmal nocturnal dyspnea and syncope  Respiratory: Positive for shortness of breath  Negative for cough  Gastrointestinal: Negative for heartburn and nausea  Neurological: Negative for dizziness, focal weakness, headaches, light-headedness and weakness  All other systems reviewed and are negative  Allergies   Allergen Reactions   • Penicillins Rash           Current Outpatient Medications:   •  aspirin (ECOTRIN LOW STRENGTH) 81 mg EC tablet, Take 81 mg by mouth daily, Disp: , Rfl:   •  carvedilol (COREG) 6 25 mg tablet, Take 1 tablet (6 25 mg total) by mouth 2 (two) times a day with meals, Disp: 60 tablet, Rfl: 0  •  Ergocalciferol (VITAMIN D2 PO), Take 1 25 mg by mouth, Disp: , Rfl:   •  furosemide (LASIX) 40 mg tablet, Take 1 tablet (40 mg total) by mouth 2 (two) times a day, Disp: 60 tablet, Rfl: 0  •  losartan (COZAAR) 100 MG tablet, Take 100 mg by mouth daily, Disp: , Rfl:   •  metFORMIN (GLUCOPHAGE) 1000 MG tablet, Take 1 tablet (1,000 mg total) by mouth 2 (two) times a day with meals Pt reports that he should be taking 1,000mg BID but takes total 2,000mg once a day, Disp: , Rfl: 0  •  potassium chloride (K-DUR,KLOR-CON) 20 mEq tablet, Take 1 tablet (20 mEq total) by mouth daily Do not start before May 10, 2023 , Disp: 30 tablet, Rfl: 0  •  rivaroxaban (Xarelto) 20 mg tablet, Take 1 tablet (20 mg total) by mouth daily with breakfast, Disp: 30 tablet, Rfl: 0  •  rosuvastatin (CRESTOR) 10 MG tablet, Take 10 mg by mouth daily, Disp: , Rfl:     Social History     Socioeconomic History   • Marital status: Single     Spouse name: Not on file   • Number of children: Not on file   • Years of education: Not on file   • Highest education level: Not on file   Occupational History   • Not on file   Tobacco Use   • Smoking status: Never   • Smokeless tobacco: Never   Substance and Sexual Activity   • Alcohol use: Not on file   • Drug use: Not on file   • Sexual activity: Not on file   Other Topics Concern   • Not on file   Social History Narrative   • Not on file     Social Determinants of Health     Financial Resource Strain: Not on file   Food Insecurity: Not on file   Transportation Needs: Not on file   Physical Activity: Not on file   Stress: Not on file   Social Connections: Not on file   Intimate Partner Violence: Not on file   Housing Stability: Not on file       No family history on file  Physical Exam  Vitals and nursing note reviewed  Constitutional:       General: He is not in acute distress  HENT:      Head: Normocephalic and atraumatic  Eyes:      Conjunctiva/sclera: Conjunctivae normal    Cardiovascular:      Rate and Rhythm: Normal rate  Rhythm irregularly irregular  Pulses: Intact distal pulses  Heart sounds: Normal heart sounds  Pulmonary:      Effort: Pulmonary effort is normal       Breath sounds: Normal breath sounds  Abdominal:      General: Bowel sounds are normal       Palpations: Abdomen is soft  Musculoskeletal:         General: Normal range of motion  Cervical back: Normal range of motion and neck supple  Right lower leg: Edema present  Left lower leg: Edema present  Skin:     General: Skin is warm and dry  Neurological:      Mental Status: He is alert and oriented to person, place, and time  Vitals: There were no vitals taken for this visit     Wt Readings from Last 3 Encounters:   05/15/23 112 kg (246 lb)   05/09/23 113 kg (248 lb 14 4 oz)         Labs & Results:  Lab Results   Component Value Date    HCT 49 1 05/06/2023    HGB 15 4 "05/06/2023     (H) 05/06/2023     05/06/2023    WBC 7 07 05/06/2023     BNP   Date Value Ref Range Status   05/06/2023 559 (H) 0 - 100 pg/mL Final     No components found for: \"CHEM\"    No results found for this or any previous visit  No results found for this or any previous visit  This note was completed in part utilizing m-modal fluency direct voice recognition software  Grammatical errors, random word insertion, spelling mistakes, and incomplete sentences may be an occasional consequence of the system secondary to software limitations, ambient noise and hardware issues  At the time of dictation, efforts were made to edit, clarify and /or correct errors  Please read the chart carefully and recognize, using context, where substitutions have occurred    If you have any questions or concerns about the context, text or information contained within the body of this dictation, please contact myself, the provider, for further clarification      "

## 2023-06-19 ENCOUNTER — OFFICE VISIT (OUTPATIENT)
Dept: CARDIOLOGY CLINIC | Facility: CLINIC | Age: 78
End: 2023-06-19
Payer: COMMERCIAL

## 2023-06-19 VITALS
OXYGEN SATURATION: 96 % | BODY MASS INDEX: 32.89 KG/M2 | DIASTOLIC BLOOD PRESSURE: 68 MMHG | HEART RATE: 63 BPM | WEIGHT: 242.5 LBS | SYSTOLIC BLOOD PRESSURE: 115 MMHG

## 2023-06-19 DIAGNOSIS — I48.0 PAROXYSMAL ATRIAL FIBRILLATION (HCC): ICD-10-CM

## 2023-06-19 DIAGNOSIS — I27.20 PULMONARY HYPERTENSION (HCC): ICD-10-CM

## 2023-06-19 DIAGNOSIS — I34.0 NONRHEUMATIC MITRAL VALVE REGURGITATION: ICD-10-CM

## 2023-06-19 DIAGNOSIS — I50.21 ACUTE SYSTOLIC CONGESTIVE HEART FAILURE (HCC): Primary | ICD-10-CM

## 2023-06-19 DIAGNOSIS — I10 PRIMARY HYPERTENSION: ICD-10-CM

## 2023-06-19 DIAGNOSIS — I50.42 CHRONIC COMBINED SYSTOLIC AND DIASTOLIC HF (HEART FAILURE) (HCC): ICD-10-CM

## 2023-06-19 DIAGNOSIS — E11.65 TYPE 2 DIABETES MELLITUS WITH HYPERGLYCEMIA, WITHOUT LONG-TERM CURRENT USE OF INSULIN (HCC): ICD-10-CM

## 2023-06-19 PROBLEM — Z09 HOSPITAL DISCHARGE FOLLOW-UP: Status: RESOLVED | Noted: 2023-05-15 | Resolved: 2023-06-19

## 2023-06-19 PROBLEM — I50.9 ACUTE CONGESTIVE HEART FAILURE (HCC): Status: RESOLVED | Noted: 2023-05-06 | Resolved: 2023-06-19

## 2023-06-19 PROCEDURE — 99215 OFFICE O/P EST HI 40 MIN: CPT | Performed by: INTERNAL MEDICINE

## 2023-06-19 RX ORDER — HYDROCHLOROTHIAZIDE 25 MG/1
25 TABLET ORAL DAILY
COMMUNITY
Start: 2023-06-10 | End: 2023-06-19

## 2023-06-19 RX ORDER — CLOPIDOGREL BISULFATE 75 MG/1
75 TABLET ORAL DAILY
COMMUNITY
Start: 2023-06-10 | End: 2023-06-19

## 2023-06-19 NOTE — PROGRESS NOTES
[facility]  Follow up note  Pavel Graff 69 y o  male MRN: [id number]        Problems    1  Acute systolic congestive heart failure (HCC)        2  Paroxysmal atrial fibrillation ([facility] [address] )        3  Nonrheumatic mitral valve regurgitation        4  Chronic combined systolic and diastolic HF (heart failure) ([facility] [address] )        5  Type 2 diabetes mellitus with hyperglycemia, without long-term current use of insulin (HCC)        6  Primary hypertension        7  Pulmonary hypertension (HCC)            Impression:    Chronic systolic CHF  LVEF 79%  RV dilation, severe pulmonary hypertension, RVOT systolic notching  Inferior hypokinesis suggests possible underlying CAD  Discharge weight 248 pounds, admission weight was 283 pounds, currently down to 242 pounds  On carvedilol 6 25 mg twice daily  On furosemide 40 mg twice daily  Unclear why he is on HCTZ as well  On losartan 100 mg daily  Atrial fibrillation  New onset 5/23, rate control strategy implemented  Well rate controlled on carvedilol, he is on Xarelto 20 mg daily  Hypertension  Blood pressure is excellent  Likely obstructive sleep apnea  He refused referral to sleep medicine  Hyperlipidemia  On rosuvastatin  Mitral regurgitation  Posterior leaflet tethering, moderate to severe posteriorly directed MR    Plan:    No changes at this time  He is not interested in nuclear stress Myoview testing, as he will not be interested in cardiac catheterization, he confirmed this in the presence of his friend with him today  Discontinue HCTZ immediately  Discontinue clopidogrel immediately      HPI:   Pavel Graff is a 69y o  year old male admitted with acute systolic/diastolic CHF, A-fib with RVR, uncontrolled hypertension, treated with a rate control strategy, not agreeable to many procedures including cardioversion/cardiac catheterization, who has done quite well, initially dropped almost 35 pounds during his hospitalization from a diuresis standpoint    Blood pressure is currently very well controlled  He refuses sleep medicine consultation for possible RUPERTO, he refuses cardiac catheterization and by extension does not have any interest in proceeding with a stress test to fully review his cardiomyopathy cause  It is suspected to be tachycardia mediated  He is doing well on rosuvastatin  He does have moderate to severe MR, but this was also noted in the presence of severe volume overload  Review of Systems   Constitutional: Negative for appetite change, diaphoresis, fatigue and fever  Respiratory: Negative for chest tightness, shortness of breath and wheezing  Cardiovascular: Negative for chest pain, palpitations and leg swelling  Gastrointestinal: Negative for abdominal pain and blood in stool  Musculoskeletal: Negative for arthralgias and joint swelling  Skin: Negative for rash  Neurological: Negative for dizziness, syncope and light-headedness  Past Medical History:   Diagnosis Date   • Diabetes mellitus ([facility] )    • Hypertension      Social History     Substance and Sexual Activity   Alcohol Use None     Social History     Substance and Sexual Activity   Drug Use Not on file     Social History     Tobacco Use   Smoking Status Never   Smokeless Tobacco Never       Allergies:   Allergies   Allergen Reactions   • Penicillins Rash       Medications:     Current Outpatient Medications:   •  aspirin (ECOTRIN LOW STRENGTH) 81 mg EC tablet, Take 81 mg by mouth daily, Disp: , Rfl:   •  carvedilol (COREG) 6 25 mg tablet, Take 1 tablet (6 25 mg total) by mouth 2 (two) times a day with meals, Disp: 60 tablet, Rfl: 0  •  ergocalciferol (VITAMIN D2) 50,000 units, Take 50,000 Units by mouth once a week, Disp: , Rfl:   •  furosemide (LASIX) 40 mg tablet, Take 1 tablet (40 mg total) by mouth 2 (two) times a day, Disp: 60 tablet, Rfl: 0  •  losartan (COZAAR) 100 MG tablet, Take 100 mg by mouth daily, Disp: , Rfl:   •  metFORMIN (GLUCOPHAGE) 1000 MG tablet, Take 1 tablet (1,000 mg total) by mouth 2 (two) times a day with meals Pt reports that he should be taking 1,000mg BID but takes total 2,000mg once a day, Disp: , Rfl: 0  •  potassium chloride (K-DUR,KLOR-CON) 20 mEq tablet, Take 1 tablet (20 mEq total) by mouth daily Do not start before May 10, 2023 , Disp: 30 tablet, Rfl: 0  •  rivaroxaban (Xarelto) 20 mg tablet, Take 1 tablet (20 mg total) by mouth daily with breakfast, Disp: 30 tablet, Rfl: 0  •  rosuvastatin (CRESTOR) 10 MG tablet, Take 10 mg by mouth daily, Disp: , Rfl:       Vitals:    06/19/23 0907   BP: 115/68   Pulse: 63   SpO2: 96%     Weight (last 2 days)     Date/Time Weight    06/19/23 0907 110 (242 5)        Physical Exam  Constitutional:       General: He is not in acute distress  Appearance: Normal appearance  He is well-developed  He is not diaphoretic  HENT:      Head: Normocephalic and atraumatic  Eyes:      General: No scleral icterus  Conjunctiva/sclera: Conjunctivae normal       Pupils: Pupils are equal, round, and reactive to light  Neck:      Thyroid: No thyromegaly  Vascular: No JVD  Cardiovascular:      Rate and Rhythm: Normal rate and regular rhythm  Heart sounds: Normal heart sounds  No murmur heard  No friction rub  No gallop  Pulmonary:      Effort: Pulmonary effort is normal  No respiratory distress  Breath sounds: Normal breath sounds  No wheezing or rales  Abdominal:      General: Bowel sounds are normal  There is no distension  Palpations: Abdomen is soft  Tenderness: There is no abdominal tenderness  Musculoskeletal:         General: No deformity  Normal range of motion  Cervical back: Normal range of motion and neck supple  Right lower leg: No edema  Left lower leg: No edema  Skin:     General: Skin is warm and dry  Findings: No erythema or rash  Neurological:      General: No focal deficit present        Mental Status: He is alert and oriented to person, place, "and time  Cranial Nerves: No cranial nerve deficit  Motor: No weakness  Laboratory Studies:    Laboratory studies personally reviewed    Cardiac testing:     EKG reviewed personally:   No results found for this visit on 06/19/23  Echocardiogram:      Stress tests:      Catheterization:      Holter:         Lizzeth Vigil MD    Portions of the record may have been created with voice recognition software  Occasional wrong word or \"sound a like\" substitutions may have occurred due to the inherent limitations of voice recognition software  Read the chart carefully and recognize, using context, where substitutions have occurred    "

## 2024-03-12 ENCOUNTER — APPOINTMENT (INPATIENT)
Dept: NON INVASIVE DIAGNOSTICS | Facility: HOSPITAL | Age: 79
DRG: 292 | End: 2024-03-12
Payer: COMMERCIAL

## 2024-03-12 ENCOUNTER — APPOINTMENT (INPATIENT)
Dept: ULTRASOUND IMAGING | Facility: HOSPITAL | Age: 79
DRG: 292 | End: 2024-03-12
Payer: COMMERCIAL

## 2024-03-12 ENCOUNTER — HOSPITAL ENCOUNTER (INPATIENT)
Facility: HOSPITAL | Age: 79
LOS: 3 days | Discharge: HOME | DRG: 292 | End: 2024-03-15
Attending: EMERGENCY MEDICINE | Admitting: HOSPITALIST
Payer: COMMERCIAL

## 2024-03-12 ENCOUNTER — APPOINTMENT (EMERGENCY)
Dept: RADIOLOGY | Facility: HOSPITAL | Age: 79
DRG: 292 | End: 2024-03-12
Payer: COMMERCIAL

## 2024-03-12 DIAGNOSIS — R18.8 OTHER ASCITES: ICD-10-CM

## 2024-03-12 DIAGNOSIS — I50.9 CHF EXACERBATION (HCC): Primary | ICD-10-CM

## 2024-03-12 DIAGNOSIS — K59.09 OTHER CONSTIPATION: ICD-10-CM

## 2024-03-12 DIAGNOSIS — N17.9 AKI (ACUTE KIDNEY INJURY) (HCC): ICD-10-CM

## 2024-03-12 PROBLEM — R17 ELEVATED BILIRUBIN: Status: ACTIVE | Noted: 2024-03-12

## 2024-03-12 PROBLEM — I50.43 ACUTE ON CHRONIC COMBINED SYSTOLIC AND DIASTOLIC HEART FAILURE (HCC): Status: ACTIVE | Noted: 2023-05-15

## 2024-03-12 LAB
2HR DELTA HS TROPONIN: -4 NG/L
4HR DELTA HS TROPONIN: -4 NG/L
ALBUMIN SERPL BCP-MCNC: 4.2 G/DL (ref 3.5–5)
ALP SERPL-CCNC: 143 U/L (ref 34–104)
ALT SERPL W P-5'-P-CCNC: 19 U/L (ref 7–52)
ANION GAP SERPL CALCULATED.3IONS-SCNC: 6 MMOL/L (ref 4–13)
ANION GAP SERPL CALCULATED.3IONS-SCNC: 7 MMOL/L (ref 4–13)
AORTIC ROOT: 3.4 CM
APICAL FOUR CHAMBER EJECTION FRACTION: 51 %
ASCENDING AORTA: 3.8 CM
AST SERPL W P-5'-P-CCNC: 32 U/L (ref 13–39)
ATRIAL RATE: 82 BPM
AV LVOT MEAN GRADIENT: 1 MMHG
AV LVOT PEAK GRADIENT: 2 MMHG
BACTERIA UR QL AUTO: NORMAL /HPF
BASOPHILS # BLD AUTO: 0.05 THOUSANDS/ÂΜL (ref 0–0.1)
BASOPHILS NFR BLD AUTO: 1 % (ref 0–1)
BILIRUB SERPL-MCNC: 1.38 MG/DL (ref 0.2–1)
BILIRUB UR QL STRIP: NEGATIVE
BNP SERPL-MCNC: 583 PG/ML (ref 0–100)
BSA FOR ECHO PROCEDURE: 2.45 M2
BUN SERPL-MCNC: 35 MG/DL (ref 5–25)
BUN SERPL-MCNC: 35 MG/DL (ref 5–25)
CALCIUM SERPL-MCNC: 9.1 MG/DL (ref 8.4–10.2)
CALCIUM SERPL-MCNC: 9.3 MG/DL (ref 8.4–10.2)
CARDIAC TROPONIN I PNL SERPL HS: 18 NG/L
CARDIAC TROPONIN I PNL SERPL HS: 18 NG/L
CARDIAC TROPONIN I PNL SERPL HS: 22 NG/L
CHLORIDE SERPL-SCNC: 102 MMOL/L (ref 96–108)
CHLORIDE SERPL-SCNC: 104 MMOL/L (ref 96–108)
CLARITY UR: CLEAR
CO2 SERPL-SCNC: 32 MMOL/L (ref 21–32)
CO2 SERPL-SCNC: 33 MMOL/L (ref 21–32)
COLOR UR: COLORLESS
CREAT SERPL-MCNC: 1.41 MG/DL (ref 0.6–1.3)
CREAT SERPL-MCNC: 1.42 MG/DL (ref 0.6–1.3)
DOP CALC LVOT PEAK VEL VTI: 13.87 CM
DOP CALC LVOT PEAK VEL: 0.69 M/S
E WAVE DECELERATION TIME: 209 MS
E/A RATIO: 3.43
EOSINOPHIL # BLD AUTO: 0.13 THOUSAND/ÂΜL (ref 0–0.61)
EOSINOPHIL NFR BLD AUTO: 2 % (ref 0–6)
ERYTHROCYTE [DISTWIDTH] IN BLOOD BY AUTOMATED COUNT: 16.9 % (ref 11.6–15.1)
FLUAV RNA RESP QL NAA+PROBE: NEGATIVE
FLUBV RNA RESP QL NAA+PROBE: NEGATIVE
FRACTIONAL SHORTENING: 32 (ref 28–44)
GFR SERPL CREATININE-BSD FRML MDRD: 47 ML/MIN/1.73SQ M
GFR SERPL CREATININE-BSD FRML MDRD: 47 ML/MIN/1.73SQ M
GLUCOSE SERPL-MCNC: 103 MG/DL (ref 65–140)
GLUCOSE SERPL-MCNC: 128 MG/DL (ref 65–140)
GLUCOSE SERPL-MCNC: 148 MG/DL (ref 65–140)
GLUCOSE SERPL-MCNC: 171 MG/DL (ref 65–140)
GLUCOSE SERPL-MCNC: 99 MG/DL (ref 65–140)
GLUCOSE UR STRIP-MCNC: NEGATIVE MG/DL
HCT VFR BLD AUTO: 49.3 % (ref 36.5–49.3)
HGB BLD-MCNC: 15.1 G/DL (ref 12–17)
HGB UR QL STRIP.AUTO: NEGATIVE
IMM GRANULOCYTES # BLD AUTO: 0.04 THOUSAND/UL (ref 0–0.2)
IMM GRANULOCYTES NFR BLD AUTO: 1 % (ref 0–2)
INTERVENTRICULAR SEPTUM IN DIASTOLE (PARASTERNAL SHORT AXIS VIEW): 1.6 CM
INTERVENTRICULAR SEPTUM: 1.6 CM (ref 0.6–1.1)
KETONES UR STRIP-MCNC: NEGATIVE MG/DL
LAAS-AP2: 58.4 CM2
LAAS-AP4: 56.4 CM2
LEFT ATRIUM SIZE: 7.6 CM
LEFT ATRIUM VOLUME (MOD BIPLANE): 289 ML
LEFT ATRIUM VOLUME INDEX (MOD BIPLANE): 118 ML/M2
LEFT INTERNAL DIMENSION IN SYSTOLE: 3.9 CM (ref 2.1–4)
LEFT VENTRICULAR INTERNAL DIMENSION IN DIASTOLE: 5.7 CM (ref 3.5–6)
LEFT VENTRICULAR POSTERIOR WALL IN END DIASTOLE: 1.6 CM
LEFT VENTRICULAR STROKE VOLUME: 95 ML
LEUKOCYTE ESTERASE UR QL STRIP: NEGATIVE
LVSV (TEICH): 95 ML
LYMPHOCYTES # BLD AUTO: 1.46 THOUSANDS/ÂΜL (ref 0.6–4.47)
LYMPHOCYTES NFR BLD AUTO: 18 % (ref 14–44)
MAGNESIUM SERPL-MCNC: 2.3 MG/DL (ref 1.9–2.7)
MCH RBC QN AUTO: 32.5 PG (ref 26.8–34.3)
MCHC RBC AUTO-ENTMCNC: 30.6 G/DL (ref 31.4–37.4)
MCV RBC AUTO: 106 FL (ref 82–98)
MONOCYTES # BLD AUTO: 0.7 THOUSAND/ÂΜL (ref 0.17–1.22)
MONOCYTES NFR BLD AUTO: 9 % (ref 4–12)
MV E'TISSUE VEL-SEP: 10 CM/S
MV PEAK A VEL: 0.35 M/S
MV PEAK E VEL: 120 CM/S
MV STENOSIS PRESSURE HALF TIME: 61 MS
MV VALVE AREA P 1/2 METHOD: 3.61
NEUTROPHILS # BLD AUTO: 5.83 THOUSANDS/ÂΜL (ref 1.85–7.62)
NEUTS SEG NFR BLD AUTO: 69 % (ref 43–75)
NITRITE UR QL STRIP: NEGATIVE
NON-SQ EPI CELLS URNS QL MICRO: NORMAL /HPF
NRBC BLD AUTO-RTO: 0 /100 WBCS
PH UR STRIP.AUTO: 5 [PH]
PLATELET # BLD AUTO: 187 THOUSANDS/UL (ref 149–390)
PMV BLD AUTO: 10.3 FL (ref 8.9–12.7)
POTASSIUM SERPL-SCNC: 4.2 MMOL/L (ref 3.5–5.3)
POTASSIUM SERPL-SCNC: 4.6 MMOL/L (ref 3.5–5.3)
PROT SERPL-MCNC: 7.2 G/DL (ref 6.4–8.4)
PROT UR STRIP-MCNC: NEGATIVE MG/DL
QRS AXIS: -63 DEGREES
QRSD INTERVAL: 104 MS
QT INTERVAL: 438 MS
QTC INTERVAL: 469 MS
RA PRESSURE ESTIMATED: 15 MMHG
RBC # BLD AUTO: 4.64 MILLION/UL (ref 3.88–5.62)
RBC #/AREA URNS AUTO: NORMAL /HPF
RIGHT ATRIAL 2D VOLUME: 257 ML
RIGHT ATRIUM AREA SYSTOLE A4C: 49.5 CM2
RIGHT VENTRICLE ID DIMENSION: 6.7 CM
RSV RNA RESP QL NAA+PROBE: NEGATIVE
RV PSP: 68 MMHG
SARS-COV-2 RNA RESP QL NAA+PROBE: NEGATIVE
SINOTUBULAR JUNCTION: 2.9 CM
SL CV LEFT ATRIUM LENGTH A2C: 9.1 CM
SL CV LV EF: 45
SL CV PED ECHO LEFT VENTRICLE DIASTOLIC VOLUME (MOD BIPLANE) 2D: 159 ML
SL CV PED ECHO LEFT VENTRICLE SYSTOLIC VOLUME (MOD BIPLANE) 2D: 64 ML
SL CV SINUS OF VALSALVA 2D: 3.5 CM
SODIUM SERPL-SCNC: 142 MMOL/L (ref 135–147)
SODIUM SERPL-SCNC: 142 MMOL/L (ref 135–147)
SP GR UR STRIP.AUTO: 1.01 (ref 1–1.03)
STJ: 2.9 CM
T WAVE AXIS: 90 DEGREES
TR MAX PG: 53 MMHG
TR PEAK VELOCITY: 3.6 M/S
TRICUSPID ANNULAR PLANE SYSTOLIC EXCURSION: 1.6 CM
TRICUSPID VALVE PEAK REGURGITATION VELOCITY: 3.64 M/S
UROBILINOGEN UR STRIP-ACNC: <2 MG/DL
VENTRICULAR RATE: 69 BPM
WBC # BLD AUTO: 8.21 THOUSAND/UL (ref 4.31–10.16)
WBC #/AREA URNS AUTO: NORMAL /HPF

## 2024-03-12 PROCEDURE — 96374 THER/PROPH/DIAG INJ IV PUSH: CPT

## 2024-03-12 PROCEDURE — 93306 TTE W/DOPPLER COMPLETE: CPT | Performed by: INTERNAL MEDICINE

## 2024-03-12 PROCEDURE — 93010 ELECTROCARDIOGRAM REPORT: CPT | Performed by: INTERNAL MEDICINE

## 2024-03-12 PROCEDURE — 76705 ECHO EXAM OF ABDOMEN: CPT

## 2024-03-12 PROCEDURE — 0241U HB NFCT DS VIR RESP RNA 4 TRGT: CPT

## 2024-03-12 PROCEDURE — 93005 ELECTROCARDIOGRAM TRACING: CPT

## 2024-03-12 PROCEDURE — 83880 ASSAY OF NATRIURETIC PEPTIDE: CPT

## 2024-03-12 PROCEDURE — 93306 TTE W/DOPPLER COMPLETE: CPT

## 2024-03-12 PROCEDURE — 36415 COLL VENOUS BLD VENIPUNCTURE: CPT

## 2024-03-12 PROCEDURE — 99223 1ST HOSP IP/OBS HIGH 75: CPT | Performed by: INTERNAL MEDICINE

## 2024-03-12 PROCEDURE — 80048 BASIC METABOLIC PNL TOTAL CA: CPT

## 2024-03-12 PROCEDURE — 84484 ASSAY OF TROPONIN QUANT: CPT

## 2024-03-12 PROCEDURE — 71046 X-RAY EXAM CHEST 2 VIEWS: CPT

## 2024-03-12 PROCEDURE — 99285 EMERGENCY DEPT VISIT HI MDM: CPT | Performed by: EMERGENCY MEDICINE

## 2024-03-12 PROCEDURE — 80053 COMPREHEN METABOLIC PANEL: CPT

## 2024-03-12 PROCEDURE — 83735 ASSAY OF MAGNESIUM: CPT

## 2024-03-12 PROCEDURE — 82948 REAGENT STRIP/BLOOD GLUCOSE: CPT

## 2024-03-12 PROCEDURE — 99285 EMERGENCY DEPT VISIT HI MDM: CPT

## 2024-03-12 PROCEDURE — 85025 COMPLETE CBC W/AUTO DIFF WBC: CPT

## 2024-03-12 PROCEDURE — 81001 URINALYSIS AUTO W/SCOPE: CPT

## 2024-03-12 RX ORDER — FUROSEMIDE 10 MG/ML
40 INJECTION INTRAMUSCULAR; INTRAVENOUS ONCE
Status: DISCONTINUED | OUTPATIENT
Start: 2024-03-12 | End: 2024-03-12

## 2024-03-12 RX ORDER — LOSARTAN POTASSIUM 50 MG/1
100 TABLET ORAL ONCE
Status: COMPLETED | OUTPATIENT
Start: 2024-03-12 | End: 2024-03-12

## 2024-03-12 RX ORDER — ACETAMINOPHEN 325 MG/1
650 TABLET ORAL EVERY 4 HOURS PRN
Status: DISCONTINUED | OUTPATIENT
Start: 2024-03-12 | End: 2024-03-15 | Stop reason: HOSPADM

## 2024-03-12 RX ORDER — CARVEDILOL 12.5 MG/1
25 TABLET ORAL ONCE
Qty: 2 TABLET | Refills: 0 | Status: DISCONTINUED | OUTPATIENT
Start: 2024-03-12 | End: 2024-03-12

## 2024-03-12 RX ORDER — INSULIN LISPRO 100 [IU]/ML
2-12 INJECTION, SOLUTION INTRAVENOUS; SUBCUTANEOUS
Status: DISCONTINUED | OUTPATIENT
Start: 2024-03-12 | End: 2024-03-15 | Stop reason: HOSPADM

## 2024-03-12 RX ORDER — FUROSEMIDE 10 MG/ML
80 INJECTION INTRAMUSCULAR; INTRAVENOUS
Status: DISCONTINUED | OUTPATIENT
Start: 2024-03-12 | End: 2024-03-13

## 2024-03-12 RX ORDER — SENNOSIDES 8.6 MG
2 TABLET ORAL
Status: DISCONTINUED | OUTPATIENT
Start: 2024-03-12 | End: 2024-03-15 | Stop reason: HOSPADM

## 2024-03-12 RX ORDER — PRAVASTATIN SODIUM 80 MG/1
80 TABLET ORAL
Status: DISCONTINUED | OUTPATIENT
Start: 2024-03-12 | End: 2024-03-15 | Stop reason: HOSPADM

## 2024-03-12 RX ORDER — CARVEDILOL 6.25 MG/1
6.25 TABLET ORAL 2 TIMES DAILY WITH MEALS
Status: DISCONTINUED | OUTPATIENT
Start: 2024-03-12 | End: 2024-03-15 | Stop reason: HOSPADM

## 2024-03-12 RX ORDER — XYLITOL/YERBA SANTA
5 AEROSOL, SPRAY WITH PUMP (ML) MUCOUS MEMBRANE 4 TIMES DAILY PRN
Status: DISCONTINUED | OUTPATIENT
Start: 2024-03-12 | End: 2024-03-15 | Stop reason: HOSPADM

## 2024-03-12 RX ORDER — FUROSEMIDE 10 MG/ML
40 INJECTION INTRAMUSCULAR; INTRAVENOUS ONCE
Status: COMPLETED | OUTPATIENT
Start: 2024-03-12 | End: 2024-03-12

## 2024-03-12 RX ADMIN — SENNOSIDES 17.2 MG: 8.6 TABLET, FILM COATED ORAL at 21:14

## 2024-03-12 RX ADMIN — PRAVASTATIN SODIUM 80 MG: 80 TABLET ORAL at 17:45

## 2024-03-12 RX ADMIN — LOSARTAN POTASSIUM 100 MG: 50 TABLET, FILM COATED ORAL at 07:53

## 2024-03-12 RX ADMIN — FUROSEMIDE 40 MG: 10 INJECTION, SOLUTION INTRAMUSCULAR; INTRAVENOUS at 07:54

## 2024-03-12 RX ADMIN — RIVAROXABAN 20 MG: 20 TABLET, FILM COATED ORAL at 07:54

## 2024-03-12 RX ADMIN — FUROSEMIDE 80 MG: 10 INJECTION, SOLUTION INTRAMUSCULAR; INTRAVENOUS at 12:10

## 2024-03-12 RX ADMIN — FUROSEMIDE 80 MG: 10 INJECTION, SOLUTION INTRAMUSCULAR; INTRAVENOUS at 17:48

## 2024-03-12 RX ADMIN — ASPIRIN 81 MG: 81 TABLET, COATED ORAL at 12:10

## 2024-03-12 RX ADMIN — CARVEDILOL 6.25 MG: 6.25 TABLET, FILM COATED ORAL at 17:45

## 2024-03-12 NOTE — ASSESSMENT & PLAN NOTE
Lab Results   Component Value Date    TBILI 1.38 (H) 03/12/2024     With normal LFTs.  Suspected 2/2 liver congestion in the setting of CHF AE.  Monitor for now.

## 2024-03-12 NOTE — CONSULTS
Cardiology   Adrian Lewis 78 y.o. male MRN: [id number]  Unit/Bed#: ED-36 Encounter: [id number]      Reason for Consult / Principal Problem:CHF    Physician Requesting Consult:  Elieser Meza MD    Cardiologist: Dr Dre Guardado    Assessment  1. 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   2. Moderate to severe mitral valve regurgitation (posterior leaflet tethering, moderate to severe posteriorly directed MR)  3. RV dilatation/RV dysfunction, severe PH/RVOT systolic notching.  -Volume overloaded on exam/imaging.  -BNP level 583.  -Chest x-ray PA/lateral-pulmonary vascular congestion.  -Outpatient diuretic regimen; Lasix 40 mg twice daily  -Inpatient diuretic regimen; IV Lasix 80 mg TID  -24-hour JERICHO balance;none recorded  -Baseline dry weight around 242 pounds.  Pt reports recent weight of 330 pounds at home.  4. Atrial fibrillation, unclear classification, newly diagnosed 5/2023; possibly chronic at this juncture.  -ECG on admission demonstrated rate controlled atrial fibrillation.  -Telemetry reviewed-rate controlled atrial fibrillation  -On carvedilol 6.25 mg twice daily  -On Xarelto 20 mg daily for systemic AC.  5. Hypertension  -BP last recorded 156/87, HR 68.  -Outpatient BP regimen; carvedilol 6.25 mg twice daily  -Inpatient BP regimen; carvedilol 6.25 mg twice daily   6. Dyslipidemia  -Lipid profile 5/6/2023; cholesterol 104, triglyceride 64, HDL 44 and LDL calculated 47.  -Previously on rosuvastatin 10 mg daily PTA, nonformulary, was switched to pravastatin 80 mg daily this admission.  7. DM type II  -HgbA1c level 6.3.    Plan  -Follow up TTE imaging results.  -Etiology of acute HF exacerbation likely multifactorial, reported dietary indiscretions w/ effective diuretic dosing superimposed on RV dysfunction/severe PH - w/ concern for worsening RV failure. Historically patient not interested in pursuing ischemic testing.  He is significantly volume  overloaded on exam with predominant right-sided features.  Marked JVD, abdominal bloating and significant bilateral lower extremity edema extending from his feet up to his thighs.  He received 1 dose of IV Lasix 40 mg this morning, which has now been adjusted by the primary team to 80 mg TID last dose received at noon.  He reports a good urinary response thus far (only 2.5 L) - not reflected by the documented outputs.   -Continue carvedilol 6.25 mg twice daily  -Continue Xarelto 20 mg daily.  -Strict I's and O's, daily standing weights, 2 g Na+ diet 1.8 LFR.  -Monitor renal function and electrolytes closely.  Replete to maintain K+ level 4.0 magnesium level 2.0.      HPI: Adrian Lewis 78 y.o. year old male with a medical history of chronic combined diastolic and systolic CHF, valvular heart disease including moderate to severe MR and severe TR, chronic atrial fibrillation, hypertension, dyslipidemia, type 2 diabetes mellitus, and obesity.  Non-smoker, denies excessive alcohol or recreational/illicit drug use.    He is quite active at his usual baseline.  He currently is still working as a .  He routinely follows with Dr. Geo kearns/  CA last seen as an outpatient back in May 2023.  Office weight at that time was 242 pounds.  He presented to the [facility] campus on [facility]/12/2024 with a 2-month history of progressive SOB/BAHENA, abdominal bloating/distention, orthopnea, and increasing bilateral lower extremity edema.  Patient states he has been compliant with his oral diuretics at home.  He often does consume foods that are higher in sodium (canned tuna fish, and sardines), and thinks he consumes more fluid than his 2 L allotment.   He was found to be volume overloaded on both exam and imaging upon his presentation to the ED concerning for an acute CHF exacerbation.  He was initiated on a course of IV diuresis by the primary team.  Cardiology has now been consulted for further treatment  recommendations/management.    Family History: History reviewed. No pertinent family history.  Historical Information   Past Medical History:   Diagnosis Date    Diabetes mellitus (HCC)     Hypertension      Past Surgical History:   Procedure Laterality Date    BACK SURGERY       Social History   Social History     Substance and Sexual Activity   Alcohol Use None     Social History     Substance and Sexual Activity   Drug Use Not on file     Social History     Tobacco Use   Smoking Status Never   Smokeless Tobacco Never     Family History: History reviewed. No pertinent family history.    Review of Systems:  Review of Systems   Constitutional:  Positive for activity change and fatigue. Negative for chills and fever.   Eyes:  Negative for visual disturbance.   Respiratory:  Positive for shortness of breath. Negative for cough and chest tightness.    Cardiovascular:  Positive for leg swelling. Negative for chest pain and palpitations.        +BAHENA   Gastrointestinal:  Positive for abdominal distention. Negative for abdominal pain.   Neurological:  Negative for dizziness, light-headedness and headaches.   All other systems reviewed and are negative.          Scheduled Meds:  Current Facility-Administered Medications   Medication Dose Route Frequency Provider Last Rate    acetaminophen  650 mg Oral Q4H PRN Chuck Olson MD      aspirin  81 mg Oral Daily Chuck Olson MD      carvedilol  6.25 mg Oral BID With Meals Chuck Olson MD      furosemide  80 mg Intravenous TID (diuretic) Chuck Olson MD      insulin lispro  2-12 Units Subcutaneous 4x Daily (AC & HS) Chuck Olson MD      pravastatin  80 mg Oral Daily With Dinner Chuck Olson MD      [START ON 3/13/2024] rivaroxaban  20 mg Oral Daily With Breakfast Chuck Olson MD      saliva substitute  5 spray Mouth/Throat 4x Daily PRN Chuck Olson MD      senna  2 tablet Oral HS Chuck Olson MD       Continuous Infusions:   PRN Meds:.  acetaminophen    saliva substitute  all current active meds have been  reviewed and current meds:   Current Facility-Administered Medications   Medication Dose Route Frequency    acetaminophen (TYLENOL) tablet 650 mg  650 mg Oral Q4H PRN    aspirin (ECOTRIN LOW STRENGTH) EC tablet 81 mg  81 mg Oral Daily    carvedilol (COREG) tablet 6.25 mg  6.25 mg Oral BID With Meals    furosemide (LASIX) injection 80 mg  80 mg Intravenous TID (diuretic)    insulin lispro (HumALOG/ADMELOG) 100 units/mL subcutaneous injection 2-12 Units  2-12 Units Subcutaneous 4x Daily (AC & HS)    pravastatin (PRAVACHOL) tablet 80 mg  80 mg Oral Daily With Dinner    [START ON 3/13/2024] rivaroxaban (XARELTO) tablet 20 mg  20 mg Oral Daily With Breakfast    saliva substitute (MOUTH KOTE) mucosal solution 5 spray  5 spray Mouth/Throat 4x Daily PRN    senna (SENOKOT) tablet 17.2 mg  2 tablet Oral HS       Allergies   Allergen Reactions    Penicillins Rash       Objective   Vitals: Blood pressure 156/87, pulse 68, temperature 97.7 °F (36.5 °C), temperature source Oral, resp. rate 20, height 6' (1.829 m), weight 126 kg (277 lb), SpO2 93%., Body mass index is 37.57 kg/m².,   Orthostatic Blood Pressures      Flowsheet Row Most Recent Value   Blood Pressure 156/87 filed at 03/12/2024 1130   Patient Position - Orthostatic VS Lying filed at 03/12/2024 1030              Intake/Output Summary (Last 24 hours) at 3/12/2024 1224  Last data filed at 3/12/2024 0939  Gross per 24 hour   Intake --   Output 1000 ml   Net -1000 ml       Invasive Devices       Peripheral Intravenous Line  Duration             Peripheral IV 03/12/24 Left Antecubital <1 day                    Physical Exam:  Physical Exam  Vitals and nursing note reviewed.   Constitutional:       General: He is not in acute distress.     Appearance: He is obese. He is not diaphoretic.   HENT:      Head: Normocephalic and atraumatic.   Eyes:      General: No scleral icterus.  Neck:      Comments: JVD.  Cardiovascular:      Rate and Rhythm: Normal rate. Rhythm irregular.       Pulses: Normal pulses.      Heart sounds: Murmur heard.   Pulmonary:      Effort: Pulmonary effort is normal.      Breath sounds: Normal breath sounds. No wheezing or rales.   Abdominal:      General: There is distension.      Palpations: Abdomen is soft.      Tenderness: There is no abdominal tenderness.   Musculoskeletal:         General: Swelling present.      Right lower leg: Edema present.      Left lower leg: Edema present.   Skin:     General: Skin is warm and dry.      Capillary Refill: Capillary refill takes less than 2 seconds.      Comments: Peripheral extremities are cool.  Pt reports that this is his baseline.   Neurological:      General: No focal deficit present.      Mental Status: He is alert.   Psychiatric:         Mood and Affect: Mood normal.         Lab Results:   Recent Results (from the past 24 hour(s))   ECG 12 lead    Collection Time: 03/12/24  7:11 AM   Result Value Ref Range    Ventricular Rate 69 BPM    Atrial Rate 82 BPM    WV Interval  ms    QRSD Interval 104 ms    QT Interval 438 ms    QTC Interval 469 ms    P Axis  degrees    QRS Axis -63 degrees    T Wave Axis 90 degrees   CBC and differential    Collection Time: 03/12/24  7:52 AM   Result Value Ref Range    WBC 8.21 4.31 - 10.16 Thousand/uL    RBC 4.64 3.88 - 5.62 Million/uL    Hemoglobin 15.1 12.0 - 17.0 g/dL    Hematocrit 49.3 36.5 - 49.3 %     (H) 82 - 98 fL    MCH 32.5 26.8 - 34.3 pg    MCHC 30.6 (L) 31.4 - 37.4 g/dL    RDW 16.9 (H) 11.6 - 15.1 %    MPV 10.3 8.9 - 12.7 fL    Platelets 187 149 - 390 Thousands/uL    nRBC 0 /100 WBCs    Neutrophils Relative 69 43 - 75 %    Immature Grans % 1 0 - 2 %    Lymphocytes Relative 18 14 - 44 %    Monocytes Relative 9 4 - 12 %    Eosinophils Relative 2 0 - 6 %    Basophils Relative 1 0 - 1 %    Neutrophils Absolute 5.83 1.85 - 7.62 Thousands/µL    Absolute Immature Grans 0.04 0.00 - 0.20 Thousand/uL    Absolute Lymphocytes 1.46 0.60 - 4.47 Thousands/µL    Absolute Monocytes 0.70  "0.17 - 1.22 Thousand/µL    Eosinophils Absolute 0.13 0.00 - 0.61 Thousand/µL    Basophils Absolute 0.05 0.00 - 0.10 Thousands/µL   Comprehensive metabolic panel    Collection Time: 03/12/24  7:52 AM   Result Value Ref Range    Sodium 142 135 - 147 mmol/L    Potassium 4.6 3.5 - 5.3 mmol/L    Chloride 104 96 - 108 mmol/L    CO2 32 21 - 32 mmol/L    ANION GAP 6 4 - 13 mmol/L    BUN 35 (H) 5 - 25 mg/dL    Creatinine 1.41 (H) 0.60 - 1.30 mg/dL    Glucose 148 (H) 65 - 140 mg/dL    Calcium 9.3 8.4 - 10.2 mg/dL    AST 32 13 - 39 U/L    ALT 19 7 - 52 U/L    Alkaline Phosphatase 143 (H) 34 - 104 U/L    Total Protein 7.2 6.4 - 8.4 g/dL    Albumin 4.2 3.5 - 5.0 g/dL    Total Bilirubin 1.38 (H) 0.20 - 1.00 mg/dL    eGFR 47 ml/min/1.73sq m   Magnesium    Collection Time: 03/12/24  7:52 AM   Result Value Ref Range    Magnesium 2.3 1.9 - 2.7 mg/dL   B-Type Natriuretic Peptide(BNP)    Collection Time: 03/12/24  7:52 AM   Result Value Ref Range     (H) 0 - 100 pg/mL   HS Troponin 0hr (reflex protocol)    Collection Time: 03/12/24  7:52 AM   Result Value Ref Range    hs TnI 0hr 22 \"Refer to ACS Flowchart\"- see link ng/L   FLU/RSV/COVID - if FLU/RSV clinically relevant    Collection Time: 03/12/24  7:52 AM    Specimen: Nose; Nares   Result Value Ref Range    SARS-CoV-2 Negative Negative    INFLUENZA A PCR Negative Negative    INFLUENZA B PCR Negative Negative    RSV PCR Negative Negative   HS Troponin I 2hr    Collection Time: 03/12/24 10:03 AM   Result Value Ref Range    hs TnI 2hr 18 \"Refer to ACS Flowchart\"- see link ng/L    Delta 2hr hsTnI -4 <20 ng/L   Echo complete    Collection Time: 03/12/24 11:47 AM   Result Value Ref Range    BSA 2.45 m2    Triscuspid Valve Regurgitation Peak Gradient 53.0 mmHg    Sinus of Valsalva, 2D 3.5 cm    RAA A4C 49.5 cm2    LA Volume Index (BP) 118.0 mL/m2    MV Peak A Keith 0.35 m/s    MV stenosis pressure 1/2 time 61 ms    MV Peak E Keith 120 cm/s    LVOT peak VTI 13.87 cm    LVOT peak keith 0.69 " m/s    E wave deceleration time 209 ms    E/A ratio 3.43     MV valve area p 1/2 method 3.60     AV LVOT peak gradient 2 mmHg    RA 2D Volume 257.0 mL    TR Peak Keith 3.6 m/s    LVOT mn grad 1.0 mmHg    RVID d 6.7 cm    A4C EF 51 %    Tricuspid valve peak regurgitation velocity 3.64 m/s    Left ventricular stroke volume (2D) 95.00 mL    IVSd 1.60 cm    Tricuspid annular plane systolic excursion 1.60 cm    Ao root 3.40 cm    Ao STJ 2.90 cm    LVPWd 1.60 cm    LA size 7.6 cm    Asc Ao 3.8 cm    STJ 2.9 cm    LA volume (BP) 289 mL    FS 32 28 - 44    LVIDS 3.90 cm    IVS 1.6 cm    LVIDd 5.70 cm    LA length (A2C) 9.10 cm    LEFT VENTRICLE SYSTOLIC VOLUME (MOD BIPLANE) 2D 64 mL    LV DIASTOLIC VOLUME (MOD BIPLANE) 2D 159 mL    Left Atrium Area-systolic Four Chamber 56.4 cm2    Left Atrium Area-systolic Apical Two Chamber 58.4 cm2    MV E' Tissue Velocity Septal 10 cm/s    LVSV, 2D 95 mL       Imaging: I have personally reviewed pertinent reports.   and I have personally reviewed pertinent films in PACS  Code Status: LVL 1 Full Code  Epic/ Allscripts/Care Everywhere records reviewed: Yes    * Please Note: Fluency DirectDictation voice to text software may have been used in the creation of this document. **

## 2024-03-12 NOTE — H&P
[facility]  H&P  Name: Adrian Lewis 78 y.o. 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  Significant JVD for 5-6 weeks.  Patient is currently volume overloaded.  Home medications: Coreg 6.25 mg twice daily, Lasix 40 mg twice daily.  Dry weight likely: 242 lbs -248 lbs.  lbs.  Reported noncompliance with fluid restriction.  Of note, patient with known pulmonary hypertension, RV dysfunction but denied RHC before.    Plan:  GDMT: Diuretic: IV lasix 80mg TID for now, B-Blocker: Coreg 6.25 mg BID, ACE/ARB/ARNi: Losartan on hold.  Sodium restriction 2g, Fluid restriction 1500cc daily for now.  BMP BID, magnesium tomorrow a.m; Goal Mg > 2 and K > 4; Replete prn  HOB > 30°, Daily standing weights, Measure I/O  Monitor on Tele  Consult nutrition for dietary education  Consult cardiology, recs appreciated.  Repeat Echo.  GDMT if BP able per cardiology.  US abd to eval ascites. IR paracentesis if able & if patient is agreeable.    Elevated bilirubin  Assessment & Plan  Lab Results   Component Value Date    TBILI 1.38 (H) 03/12/2024     With normal LFTs.  Suspected 2/2 liver congestion in the setting of CHF AE.  Monitor for now.    FRANCESCO (acute kidney injury) (HCC)  Assessment & Plan  Lab Results   Component Value Date    CREATININE 1.41 (H) 03/12/2024    CREATININE 1.00 05/15/2023    CREATININE  "0.92 05/09/2023     POA 1.41.  Baseline around 1.  Likely second to fluid overload, cardiorenal.  Reported good urine output.  Received 40 mg Lasix IV in the ED.    Plan:  UA w/ microscopic to eval cast, Daily weights, I/O  Diuresis per CHF.  Monitor BMP BID while on high dose diuresis.  Avoid hypoperfusion of the kidneys, minimize nephrotoxins- if near euvolemic.  RAAS Blockers/Diuretics held: Losartan      Atrial fibrillation (HCC)  Assessment & Plan  Onset on 5/2023 with rate controlled.  Continue Home meds: Coreg 6.25mg BID and Xarelto 20 mg daily.    Primary hypertension  Assessment & Plan  Home medications: Coreg 6.25 mg twice daily, losartan 100 mg daily.  Continue Coreg 6.5 mg twice daily.  Hold losartan in the setting of FRANCESCO and allow higher dose of diuresis.    Type 2 diabetes mellitus with hyperglycemia, without long-term current use of insulin ([facility])  Assessment & Plan  Lab Results   Component Value Date    HGBA1C 6.3 (H) 05/06/2023       No results for input(s): \"POCGLU\" in the last 72 hours.    Blood Sugar Average: Last 72 hrs:  Home medication: Metformin 1000 mg twice daily.    Plan:  ISS Algo 4 with Accu-Chek 4 times daily.  Diabetic diet.  Hypoglycemic protocol.           VTE Pharmacologic Prophylaxis: VTE Score: 6 High Risk (Score >/= 5) - Pharmacological DVT Prophylaxis Ordered: rivaroxaban (Xarelto). Sequential Compression Devices Ordered.  Code Status: Level 1 - Full Code Discussed with patient.  Discussion with family:  Updated friend at bedside.     Anticipated Length of Stay: Patient will be admitted on an inpatient basis with an anticipated length of stay of greater than 2 midnights secondary to acute on chronic CHF exacerbation.    Chief Complaint: Shortness of breath/BAHENA in the past 5 to 6 weeks.    History of Present Illness:  Adrian Lewis is a 78 y.o. male with a PMH of hypertension, hyperlipidemia, A-fib on Xarelto, combined systolic and diastolic CHF (last EF 50% with wall motion " abdominally, RV dysfunction with pulmonary hypertension), T2DM on metformin who presents with shortness of breath in the past 5 to 6 weeks.  Patient is Frisian speaking only. Encounter was conducted by patient's friend as .  Patient reported he has experiencing worsening shortness of breath, BAHENA with occasional palpitation in the past 5 to 6 weeks.  Reported compliance with medications.  Never skipped a dosage.  However, friend at bedside reported patient was not compliant with his fluid restriction.  He was drinking a lot of water. Unclear of diet. Denied any canned foods. Reported he was not able to lay flat overnight and had to sleep on the chair in the past few weeks and also noticed his abdominal girth got larger and he was not able to wear his belt. + orthopnea, lower extremity edema and weight gain. Denied any headache, dizziness, URI symptoms, chest pain, nausea vomiting, abdominal pain. + Constipation.    In ED, elevated BP, on RA. CBC/CMP remarkable for FRANCESCO with creatinine 1.41. . Troponin 22-18-?  Viral panel negative.  EKG A-fib with rate controlled.  CXR per my reading- pulmonary edema with cardiomegaly, + Pleural effusion. Patient to be admitted under [facility] for further workup and management.    Review of Systems:  Review of Systems   Constitutional:  Negative for chills and fever.   HENT:  Negative for ear pain, sore throat, trouble swallowing and voice change.    Eyes:  Negative for pain and visual disturbance.   Respiratory:  Positive for cough and shortness of breath. Negative for wheezing.    Cardiovascular:  Positive for palpitations and leg swelling. Negative for chest pain.   Gastrointestinal:  Positive for abdominal distention and constipation. Negative for abdominal pain, diarrhea, nausea and vomiting.   Genitourinary:  Positive for frequency. Negative for dysuria, hematuria and urgency.   Skin:  Negative for color change.   Neurological:  Negative for dizziness, seizures,  syncope, facial asymmetry, speech difficulty, light-headedness and headaches.   Psychiatric/Behavioral:  Negative for agitation, behavioral problems, confusion, dysphoric mood and hallucinations. The patient is not nervous/anxious and is not hyperactive.    All other systems reviewed and are negative.      Past Medical and Surgical History:   Past Medical History:   Diagnosis Date    Diabetes mellitus (HCC)     Hypertension        Past Surgical History:   Procedure Laterality Date    BACK SURGERY         Meds/Allergies:  Prior to Admission medications    Medication Sig Start Date End Date Taking? Authorizing Provider   aspirin (ECOTRIN LOW STRENGTH) 81 mg EC tablet Take 81 mg by mouth daily   Yes Historical Provider, MD   carvedilol (COREG) 6.25 mg tablet Take 1 tablet (6.25 mg total) by mouth 2 (two) times a day with meals 5/9/23  Yes Narcisa Coffman PA-C   ergocalciferol (VITAMIN D2) 50,000 units Take 50,000 Units by mouth once a week 5/1/23  Yes Historical Provider, MD   furosemide (LASIX) 40 mg tablet Take 1 tablet (40 mg total) by mouth 2 (two) times a day 5/9/23  Yes Narcisa Coffman PA-C   losartan (COZAAR) 100 MG tablet Take 100 mg by mouth daily   Yes Historical Provider, MD   metFORMIN (GLUCOPHAGE) 1000 MG tablet Take 1 tablet (1,000 mg total) by mouth 2 (two) times a day with meals Pt reports that he should be taking 1,000mg BID but takes total 2,000mg once a day 5/9/23  Yes Narcisa Coffman PA-C   potassium chloride (K-DUR,KLOR-CON) 20 mEq tablet Take 1 tablet (20 mEq total) by mouth daily Do not start before May 10, 2023. 5/10/23  Yes Narcisa Coffman PA-C   rivaroxaban (Xarelto) 20 mg tablet Take 1 tablet (20 mg total) by mouth daily with breakfast 5/9/23  Yes Narcisa Coffman PA-C   rosuvastatin (CRESTOR) 10 MG tablet Take 10 mg by mouth daily   Yes Historical Provider, MD     I have reviewed home medications with patient- interpreted by friend at bedside.    Allergies:   Allergies   Allergen Reactions     Penicillins Rash       Social History:  Marital Status: Single   Occupation:   Patient Pre-hospital Living Situation: Alone  Patient Pre-hospital Level of Mobility: walks  Patient Pre-hospital Diet Restrictions: Fluid restriction but noncompliant  Substance Use History:   Social History     Substance and Sexual Activity   Alcohol Use None     Social History     Tobacco Use   Smoking Status Never   Smokeless Tobacco Never     Social History     Substance and Sexual Activity   Drug Use Not on file       Family History:  History reviewed. No pertinent family history.    Physical Exam:     Vitals:   Blood Pressure: 156/87 (03/12/24 1030)  Pulse: 68 (03/12/24 1030)  Temperature: 97.7 °F (36.5 °C) (03/12/24 0740)  Temp Source: Oral (03/12/24 0740)  Respirations: 20 (03/12/24 1030)  Height: 6' (182.9 cm) (03/12/24 0705)  Weight - Scale: 126 kg (277 lb 12.5 oz) (03/12/24 0705)  SpO2: 93 % (03/12/24 1030)    Physical Exam  Vitals and nursing note reviewed.   Constitutional:       General: He is not in acute distress.     Appearance: He is well-developed. He is obese. He is not ill-appearing.   HENT:      Head: Normocephalic and atraumatic.      Nose: Nose normal.      Mouth/Throat:      Mouth: Mucous membranes are moist.      Pharynx: No oropharyngeal exudate or posterior oropharyngeal erythema.   Eyes:      General:         Right eye: No discharge.         Left eye: No discharge.      Extraocular Movements: Extraocular movements intact.   Neck:      Comments: Significant JVD  Cardiovascular:      Rate and Rhythm: Normal rate. Rhythm irregular.      Pulses: Normal pulses.      Heart sounds: Murmur heard.   Pulmonary:      Effort: Pulmonary effort is normal. No respiratory distress.      Breath sounds: Rales present. No wheezing or rhonchi.      Comments: Bibasilar diminished breath sounds. Scattered rales.  Chest:      Chest wall: No tenderness.   Abdominal:      General: There is distension (significant).      Tenderness:  There is no abdominal tenderness. There is no guarding or rebound.      Comments: Fluid wave+  Abdominal wall with 1-2+ pitting edema   Musculoskeletal:         General: No tenderness.      Cervical back: Normal range of motion and neck supple. No tenderness.      Right lower leg: Edema present.      Left lower leg: Edema present.      Comments: Bilateral lower extremity 3+ edema up to mid thigh level.   Lymphadenopathy:      Cervical: No cervical adenopathy.   Skin:     General: Skin is warm and dry.      Capillary Refill: Capillary refill takes less than 2 seconds.   Neurological:      Mental Status: He is alert and oriented to person, place, and time.   Psychiatric:         Mood and Affect: Mood normal.         Behavior: Behavior normal.         Thought Content: Thought content normal.         Judgment: Judgment normal.          Additional Data:     Lab Results:  Results from last 7 days   Lab Units 03/12/24  0752   WBC Thousand/uL 8.21   HEMOGLOBIN g/dL 15.1   HEMATOCRIT % 49.3   PLATELETS Thousands/uL 187   NEUTROS PCT % 69   LYMPHS PCT % 18   MONOS PCT % 9   EOS PCT % 2     Results from last 7 days   Lab Units 03/12/24  0752   SODIUM mmol/L 142   POTASSIUM mmol/L 4.6   CHLORIDE mmol/L 104   CO2 mmol/L 32   BUN mg/dL 35*   CREATININE mg/dL 1.41*   ANION GAP mmol/L 6   CALCIUM mg/dL 9.3   ALBUMIN g/dL 4.2   TOTAL BILIRUBIN mg/dL 1.38*   ALK PHOS U/L 143*   ALT U/L 19   AST U/L 32   GLUCOSE RANDOM mg/dL 148*                       Lines/Drains:  Invasive Devices       Peripheral Intravenous Line  Duration             Peripheral IV 03/12/24 Left Antecubital <1 day                        Imaging: Reviewed radiology reports from this admission including: chest xray  XR chest 2 views    (Results Pending)   US abdomen limited    (Results Pending)       EKG and Other Studies Reviewed on Admission:   EKG: Atrial fibrillation. HR 69.    ** Please Note: This note has been constructed using a voice recognition system.  **

## 2024-03-12 NOTE — ASSESSMENT & PLAN NOTE
Wt Readings from Last 3 Encounters:   03/12/24 126 kg (277 lb 12.5 oz)   06/19/23 110 kg (242 lb 8 oz)   05/30/23 110 kg (243 lb)     Lab Results   Component Value Date    LVEF 50 05/07/2023     (H) 03/12/2024     (H) 05/06/2023       Presents with increased shortness of breath, dyspnea on exertion, lower extremity edema, cough with productive sputum, and increased abdominal girth.  Significant JVD for 5-6 weeks.  Patient is currently volume overloaded.  Home medications: Coreg 6.25 mg twice daily, Lasix 40 mg twice daily.  Dry weight likely: 242 lbs -248 lbs.  lbs.  Reported noncompliance with fluid restriction.  Of note, patient with known pulmonary hypertension, RV dysfunction but denied RHC before.    Plan:  GDMT: Diuretic: IV lasix 80mg TID for now, B-Blocker: Coreg 6.25 mg BID, ACE/ARB/ARNi: Losartan on hold.  Sodium restriction 2g. FR 1500cc daily.  BMP BID, magnesium tomorrow a.m; Goal Mg > 2 and K > 4; Replete prn  HOB > 30°, Daily standing weights, Measure I/O  Monitor on Tele  Consult nutrition for dietary education  Consult cardiology, recs appreciated.  Repeat Echo.  GDMT if BP able per cardiology.  US abd to eval ascites. IR paracentesis if able & if patient is agreeable.

## 2024-03-12 NOTE — ED PROVIDER NOTES
History  Chief Complaint   Patient presents with    Shortness of Breath     Hx of CHF, increased SOB on exertion & bilat leg swelling/pain. +ambulatory dysfunction.       Shortness of Breath  Associated symptoms: cough    Associated symptoms: no abdominal pain, no chest pain, no ear pain, no fever, no neck pain, no sore throat and no vomiting       78yoM with hx of CHF, pHTN, mitral regurgitation, Afib on xarelto, DMT2 presents to the hospital due to increasing sob, bilateral leg swelling and increased weight gain over the last 6 months. He takes lasix 40mg daily and states he has been compliant with it. Feel very fatigued, having trouble walking around. Has a dry mouth and noticed increased abdominal girth. Lives by himself. Sleeps in a chair sitting up due to difficulty breathing laying flat. Non-adherent with his CPAP. +orthopnea. No hx of PE/DVT, no recent long flights/car rides, no hx of malignancy that he's aware of. Denies any chest pain. Past smoker.       Dry weight: d/c may 2023 at 248lb, 113kg. Today 277lb, 126kg  Daily diuretic dosing: lasix 40 daily, compliant    Prior to Admission Medications   Prescriptions Last Dose Informant Patient Reported? Taking?   aspirin (ECOTRIN LOW STRENGTH) 81 mg EC tablet 3/11/2024 Friend Yes Yes   Sig: Take 81 mg by mouth daily   carvedilol (COREG) 6.25 mg tablet 3/11/2024 Friend No Yes   Sig: Take 1 tablet (6.25 mg total) by mouth 2 (two) times a day with meals   ergocalciferol (VITAMIN D2) 50,000 units 3/11/2024 Friend Yes Yes   Sig: Take 50,000 Units by mouth once a week   furosemide (LASIX) 40 mg tablet 3/11/2024 Friend No Yes   Sig: Take 1 tablet (40 mg total) by mouth 2 (two) times a day   losartan (COZAAR) 100 MG tablet 3/11/2024 Friend Yes Yes   Sig: Take 100 mg by mouth daily   metFORMIN (GLUCOPHAGE) 1000 MG tablet 3/11/2024 Friend No Yes   Sig: Take 1 tablet (1,000 mg total) by mouth 2 (two) times a day with meals Pt reports that he should be taking 1,000mg BID  but takes total 2,000mg once a day   potassium chloride (K-DUR,KLOR-CON) 20 mEq tablet 3/11/2024 Friend No Yes   Sig: Take 1 tablet (20 mEq total) by mouth daily Do not start before May 10, 2023.   rivaroxaban (Xarelto) 20 mg tablet 3/11/2024 Friend No Yes   Sig: Take 1 tablet (20 mg total) by mouth daily with breakfast   rosuvastatin (CRESTOR) 10 MG tablet 3/11/2024 Friend Yes Yes   Sig: Take 10 mg by mouth daily      Facility-Administered Medications: None       Past Medical History:   Diagnosis Date    Diabetes mellitus (HCC)     Hypertension        Past Surgical History:   Procedure Laterality Date    BACK SURGERY         History reviewed. No pertinent family history.  I have reviewed and agree with the history as documented.    E-Cigarette/Vaping     E-Cigarette/Vaping Substances     Social History     Tobacco Use    Smoking status: Never    Smokeless tobacco: Never        Review of Systems   Constitutional:  Positive for activity change and fatigue. Negative for chills and fever.   HENT:  Negative for ear pain and sore throat.    Eyes:  Negative for pain and visual disturbance.   Respiratory:  Positive for cough and shortness of breath.    Cardiovascular:  Positive for leg swelling. Negative for chest pain.   Gastrointestinal:  Positive for abdominal distention. Negative for abdominal pain, blood in stool, constipation, diarrhea, nausea and vomiting.   Genitourinary:  Negative for dysuria and hematuria.   Musculoskeletal:  Negative for neck pain and neck stiffness.   Neurological:  Negative for dizziness, syncope, weakness and light-headedness.   All other systems reviewed and are negative.      Physical Exam  ED Triage Vitals   Temperature Pulse Respirations Blood Pressure SpO2   03/12/24 0708 03/12/24 0705 03/12/24 0705 03/12/24 0705 03/12/24 0705   (!) 94.4 °F (34.7 °C) 63 22 156/71 93 %      Temp Source Heart Rate Source Patient Position - Orthostatic VS BP Location FiO2 (%)   03/12/24 0708 03/12/24 0705  03/12/24 0705 03/12/24 0705 --   Oral Monitor Sitting Right arm       Pain Score       03/12/24 1511       No Pain             Orthostatic Vital Signs  Vitals:    03/14/24 0732 03/14/24 0808 03/14/24 1126 03/14/24 1438   BP: 131/85  116/61 127/73   Pulse:  64 62 73   Patient Position - Orthostatic VS:           Physical Exam  Vitals and nursing note reviewed.   Constitutional:       General: He is not in acute distress.     Appearance: He is well-developed. He is not ill-appearing, toxic-appearing or diaphoretic.      Interventions: He is not intubated.  HENT:      Head: Normocephalic and atraumatic.   Eyes:      Conjunctiva/sclera: Conjunctivae normal.   Cardiovascular:      Rate and Rhythm: Normal rate and regular rhythm.      Heart sounds: No murmur heard.  Pulmonary:      Effort: Pulmonary effort is normal. No tachypnea, bradypnea, accessory muscle usage or respiratory distress. He is not intubated.      Breath sounds: No stridor. Examination of the right-middle field reveals rales. Examination of the left-middle field reveals rales. Examination of the right-lower field reveals rales. Examination of the left-lower field reveals rales. Rales present. No decreased breath sounds, wheezing or rhonchi.   Chest:      Chest wall: No mass, deformity, tenderness, crepitus or edema. There is no dullness to percussion.   Abdominal:      General: Abdomen is protuberant. Bowel sounds are normal.      Palpations: Abdomen is soft. There is no shifting dullness, fluid wave, hepatomegaly, splenomegaly, mass or pulsatile mass.      Tenderness: There is no abdominal tenderness.      Hernia: No hernia is present.   Musculoskeletal:         General: No swelling.      Cervical back: Neck supple.      Right lower leg: Edema present.      Left lower leg: Edema present.   Skin:     General: Skin is warm and dry.      Capillary Refill: Capillary refill takes less than 2 seconds.      Coloration: Skin is not cyanotic or pale.       Findings: No ecchymosis, erythema or rash.      Nails: There is no clubbing.      Comments: Right foot is colder to touch than left. DP pulses identified with doppler.    Neurological:      General: No focal deficit present.      Mental Status: He is alert and oriented to person, place, and time.   Psychiatric:         Mood and Affect: Mood normal.         ED Medications  Medications   senna (SENOKOT) tablet 17.2 mg (17.2 mg Oral Given 3/13/24 2155)   acetaminophen (TYLENOL) tablet 650 mg (has no administration in time range)   aspirin (ECOTRIN LOW STRENGTH) EC tablet 81 mg (81 mg Oral Given 3/14/24 0810)   carvedilol (COREG) tablet 6.25 mg (6.25 mg Oral Given 3/14/24 0811)   rivaroxaban (XARELTO) tablet 20 mg (20 mg Oral Given 3/14/24 0812)   pravastatin (PRAVACHOL) tablet 80 mg (80 mg Oral Given 3/13/24 1711)   saliva substitute (MOUTH KOTE) mucosal solution 5 spray (has no administration in time range)   insulin lispro (HumALOG/ADMELOG) 100 units/mL subcutaneous injection 2-12 Units ( Subcutaneous Not Given 3/14/24 1155)   diphenhydrAMINE (BENADRYL) tablet 12.5 mg (has no administration in time range)   losartan (COZAAR) tablet 50 mg (50 mg Oral Given 3/14/24 0811)   bisacodyl (DULCOLAX) rectal suppository 10 mg (has no administration in time range)   furosemide (LASIX) injection 40 mg (40 mg Intravenous Given 3/12/24 0754)   losartan (COZAAR) tablet 100 mg (100 mg Oral Given 3/12/24 0753)   rivaroxaban (XARELTO) tablet 20 mg (20 mg Oral Given 3/12/24 0754)   polyethylene glycol (MIRALAX) packet 17 g (17 g Oral Given 3/14/24 0928)       Diagnostic Studies  Results Reviewed       Procedure Component Value Units Date/Time    Basic metabolic panel [[id number]]  (Abnormal) Collected: 03/13/24 1950    Lab Status: Final result Specimen: Blood from Arm, Right Updated: 03/13/24 2052     Sodium 143 mmol/L      Potassium 3.9 mmol/L      Chloride 96 mmol/L      CO2 39 mmol/L      ANION GAP 8 mmol/L      BUN 36 mg/dL       Creatinine 1.43 mg/dL      Glucose 125 mg/dL      Calcium 9.2 mg/dL      eGFR 46 ml/min/1.73sq m     Narrative:      National Kidney Disease Foundation guidelines for Chronic Kidney Disease (CKD):     Stage 1 with normal or high GFR (GFR > 90 mL/min/1.73 square meters)    Stage 2 Mild CKD (GFR = 60-89 mL/min/1.73 square meters)    Stage 3A Moderate CKD (GFR = 45-59 mL/min/1.73 square meters)    Stage 3B Moderate CKD (GFR = 30-44 mL/min/1.73 square meters)    Stage 4 Severe CKD (GFR = 15-29 mL/min/1.73 square meters)    Stage 5 End Stage CKD (GFR <15 mL/min/1.73 square meters)  Note: GFR calculation is accurate only with a steady state creatinine    Basic metabolic panel [[id number]]  (Abnormal) Collected: 03/13/24 1038    Lab Status: Final result Specimen: Blood from Arm, Right Updated: 03/13/24 1111     Sodium 141 mmol/L      Potassium 4.1 mmol/L      Chloride 100 mmol/L      CO2 34 mmol/L      ANION GAP 7 mmol/L      BUN 32 mg/dL      Creatinine 1.26 mg/dL      Glucose 114 mg/dL      Calcium 9.0 mg/dL      eGFR 54 ml/min/1.73sq m     Narrative:      National Kidney Disease Foundation guidelines for Chronic Kidney Disease (CKD):     Stage 1 with normal or high GFR (GFR > 90 mL/min/1.73 square meters)    Stage 2 Mild CKD (GFR = 60-89 mL/min/1.73 square meters)    Stage 3A Moderate CKD (GFR = 45-59 mL/min/1.73 square meters)    Stage 3B Moderate CKD (GFR = 30-44 mL/min/1.73 square meters)    Stage 4 Severe CKD (GFR = 15-29 mL/min/1.73 square meters)    Stage 5 End Stage CKD (GFR <15 mL/min/1.73 square meters)  Note: GFR calculation is accurate only with a steady state creatinine    Magnesium [[id number]]  (Normal) Collected: 03/13/24 0557    Lab Status: Final result Specimen: Blood from Arm, Left Updated: 03/13/24 0728     Magnesium 1.9 mg/dL     Hepatic function panel [[id number]]  (Abnormal) Collected: 03/13/24 0598    Lab Status: Final result Specimen: Blood from Arm, Left Updated: 03/13/24 0739     Total Bilirubin  1.51 mg/dL      Bilirubin, Direct 0.46 mg/dL      Alkaline Phosphatase 123 U/L      AST 30 U/L      ALT 16 U/L      Total Protein 6.2 g/dL      Albumin 3.7 g/dL     CBC (With Platelets) [[id number]]  (Abnormal) Collected: 03/13/24 0557    Lab Status: Final result Specimen: Blood from Arm, Left Updated: 03/13/24 0716     WBC 7.25 Thousand/uL      RBC 4.30 Million/uL      Hemoglobin 13.9 g/dL      Hematocrit 44.4 %       fL      MCH 32.3 pg      MCHC 31.3 g/dL      RDW 16.9 %      Platelets 164 Thousands/uL      MPV 10.2 fL     Basic metabolic panel [[id number]]  (Abnormal) Collected: 03/12/24 1828    Lab Status: Final result Specimen: Blood from Arm, Left Updated: 03/12/24 1904     Sodium 142 mmol/L      Potassium 4.2 mmol/L      Chloride 102 mmol/L      CO2 33 mmol/L      ANION GAP 7 mmol/L      BUN 35 mg/dL      Creatinine 1.42 mg/dL      Glucose 171 mg/dL      Calcium 9.1 mg/dL      eGFR 47 ml/min/1.73sq m     Narrative:      National Kidney Disease Foundation guidelines for Chronic Kidney Disease (CKD):     Stage 1 with normal or high GFR (GFR > 90 mL/min/1.73 square meters)    Stage 2 Mild CKD (GFR = 60-89 mL/min/1.73 square meters)    Stage 3A Moderate CKD (GFR = 45-59 mL/min/1.73 square meters)    Stage 3B Moderate CKD (GFR = 30-44 mL/min/1.73 square meters)    Stage 4 Severe CKD (GFR = 15-29 mL/min/1.73 square meters)    Stage 5 End Stage CKD (GFR <15 mL/min/1.73 square meters)  Note: GFR calculation is accurate only with a steady state creatinine    Fingerstick Glucose (POCT) [[id number]]  (Normal) Collected: 03/12/24 1311    Lab Status: Final result Specimen: Blood Updated: 03/12/24 1312     POC Glucose 99 mg/dl     Urinalysis with microscopic [[id number]] Collected: 03/12/24 1209    Lab Status: Final result Specimen: Urine, Clean Catch Updated: 03/12/24 1308     Color, UA Colorless     Clarity, UA Clear     Specific Gravity, UA 1.008     pH, UA 5.0     Leukocytes, UA Negative     Nitrite, UA Negative      Protein, UA Negative mg/dl      Glucose, UA Negative mg/dl      Ketones, UA Negative mg/dl      Urobilinogen, UA <2.0 mg/dl      Bilirubin, UA Negative     Occult Blood, UA Negative     RBC, UA 1-2 /hpf      WBC, UA 1-2 /hpf      Epithelial Cells None Seen /hpf      Bacteria, UA None Seen /hpf     HS Troponin I 4hr [[id number]]  (Normal) Collected: 03/12/24 1209    Lab Status: Final result Specimen: Blood from Arm, Left Updated: 03/12/24 1250     hs TnI 4hr 18 ng/L      Delta 4hr hsTnI -4 ng/L     HS Troponin I 2hr [[id number]]  (Normal) Collected: 03/12/24 1003    Lab Status: Final result Specimen: Blood from Arm, Left Updated: 03/12/24 1036     hs TnI 2hr 18 ng/L      Delta 2hr hsTnI -4 ng/L     FLU/RSV/COVID - if FLU/RSV clinically relevant [[id number]]  (Normal) Collected: 03/12/24 0752    Lab Status: Final result Specimen: Nares from Nose Updated: 03/12/24 0845     SARS-CoV-2 Negative     INFLUENZA A PCR Negative     INFLUENZA B PCR Negative     RSV PCR Negative    Narrative:      FOR PEDIATRIC PATIENTS - copy/paste COVID Guidelines URL to browser: [email or web address]    SARS-CoV-2 assay is a Nucleic Acid Amplification assay intended for the  qualitative detection of nucleic acid from SARS-CoV-2 in nasopharyngeal  swabs. Results are for the presumptive identification of SARS-CoV-2 RNA.    Positive results are indicative of infection with SARS-CoV-2, the virus  causing COVID-19, but do not rule out bacterial infection or co-infection  with other viruses. Laboratories within the [place] and its  territories are required to report all positive results to the appropriate  public health authorities. Negative results do not preclude SARS-CoV-2  infection and should not be used as the sole basis for treatment or other  patient management decisions. Negative results must be combined with  clinical observations, patient history, and epidemiological  information.  This test has not been FDA cleared or approved.    This test has been authorized by FDA under an Emergency Use Authorization  (EUA). This test is only authorized for the duration of time the  declaration that circumstances exist justifying the authorization of the  emergency use of an in vitro diagnostic tests for detection of SARS-CoV-2  virus and/or diagnosis of COVID-19 infection under section 564(b)(1) of  the Act, 21 U.S.C. 360bbb-3(b)(1), unless the authorization is terminated  or revoked sooner. The test has been validated but independent review by FDA  and CLIA is pending.    Test performed using [employer] GeneXpert: This RT-PCR assay targets N2,  a region unique to SARS-CoV-2. A conserved region in the E-gene was chosen  for pan-Sarbecovirus detection which includes SARS-CoV-2.    According to CMS-2020-01-R, this platform meets the definition of high-throughput technology.    B-Type Natriuretic Peptide(BNP) [[id number]]  (Abnormal) Collected: 03/12/24 0752    Lab Status: Final result Specimen: Blood from Arm, Left Updated: 03/12/24 0837      pg/mL     HS Troponin 0hr (reflex protocol) [[id number]]  (Normal) Collected: 03/12/24 0752    Lab Status: Final result Specimen: Blood from Arm, Left Updated: 03/12/24 0831     hs TnI 0hr 22 ng/L     Comprehensive metabolic panel [[id number]]  (Abnormal) Collected: 03/12/24 0752    Lab Status: Final result Specimen: Blood from Arm, Left Updated: 03/12/24 0826     Sodium 142 mmol/L      Potassium 4.6 mmol/L      Chloride 104 mmol/L      CO2 32 mmol/L      ANION GAP 6 mmol/L      BUN 35 mg/dL      Creatinine 1.41 mg/dL      Glucose 148 mg/dL      Calcium 9.3 mg/dL      AST 32 U/L      ALT 19 U/L      Alkaline Phosphatase 143 U/L      Total Protein 7.2 g/dL      Albumin 4.2 g/dL      Total Bilirubin 1.38 mg/dL      eGFR 47 ml/min/1.73sq m     Narrative:      National Kidney Disease Foundation guidelines for Chronic Kidney Disease (CKD):     Stage 1 with  normal or high GFR (GFR > 90 mL/min/1.73 square meters)    Stage 2 Mild CKD (GFR = 60-89 mL/min/1.73 square meters)    Stage 3A Moderate CKD (GFR = 45-59 mL/min/1.73 square meters)    Stage 3B Moderate CKD (GFR = 30-44 mL/min/1.73 square meters)    Stage 4 Severe CKD (GFR = 15-29 mL/min/1.73 square meters)    Stage 5 End Stage CKD (GFR <15 mL/min/1.73 square meters)  Note: GFR calculation is accurate only with a steady state creatinine    Magnesium [[id number]]  (Normal) Collected: 03/12/24 0752    Lab Status: Final result Specimen: Blood from Arm, Left Updated: 03/12/24 0826     Magnesium 2.3 mg/dL     CBC and differential [[id number]]  (Abnormal) Collected: 03/12/24 0752    Lab Status: Final result Specimen: Blood from Arm, Left Updated: 03/12/24 0809     WBC 8.21 Thousand/uL      RBC 4.64 Million/uL      Hemoglobin 15.1 g/dL      Hematocrit 49.3 %       fL      MCH 32.5 pg      MCHC 30.6 g/dL      RDW 16.9 %      MPV 10.3 fL      Platelets 187 Thousands/uL      nRBC 0 /100 WBCs      Neutrophils Relative 69 %      Immature Grans % 1 %      Lymphocytes Relative 18 %      Monocytes Relative 9 %      Eosinophils Relative 2 %      Basophils Relative 1 %      Neutrophils Absolute 5.83 Thousands/µL      Absolute Immature Grans 0.04 Thousand/uL      Absolute Lymphocytes 1.46 Thousands/µL      Absolute Monocytes 0.70 Thousand/µL      Eosinophils Absolute 0.13 Thousand/µL      Basophils Absolute 0.05 Thousands/µL                    US abdomen limited   Final Result by Juany Kyle MD (03/12 1501)   Present ascites, as above.                  Workstation performed: [id number]         XR chest 2 views   Final Result by Arvind Lucas MD (03/12 7331)      Mild pulmonary vascular congestion and small right pleural effusion. Markedly enlarged cardiac silhouette similar to prior study may reflect underlying cardiomegaly or pericardial effusion.            Workstation performed: [id number]               Procedures  ECG  12 Lead Documentation Only    Date/Time: 3/12/2024 2:41 PM    Performed by: Sultana Camp MD  Authorized by: Sultana Camp MD    Indications / Diagnosis:  Sob  ECG reviewed by me, the ED Provider: yes    Patient location:  ED  Previous ECG:     Previous ECG:  Compared to current  Rate:     ECG rate:  69  Rhythm:     Rhythm: atrial fibrillation    Ectopy:     Ectopy: none    QRS:     QRS axis:  Left    QRS intervals:  Wide  ST segments:     ST segments:  Normal  T waves:     T waves: normal    Q waves:     Q waves:  V1, V2, V3, V4, V6, III, II and aVF  Comments:      Qtc 469        ED Course  ED Course as of 03/14/24 1452   Tue Mar 12, 2024   0959 Creatinine(!): 1.41  FRANCESCO   0959 BNP(!): 583                             SBIRT 22yo+      Flowsheet Row Most Recent Value   Initial Alcohol Screen: US AUDIT-C     1. How often do you have a drink containing alcohol? 0 Filed at: 03/12/2024 0734   2. How many drinks containing alcohol do you have on a typical day you are drinking?  0 Filed at: 03/12/2024 0734   3a. Male UNDER 65: How often do you have five or more drinks on one occasion? 0 Filed at: 03/12/2024 0734   3b. FEMALE Any Age, or MALE 65+: How often do you have 4 or more drinks on one occassion? 0 Filed at: 03/12/2024 0734   Audit-C Score 0 Filed at: 03/12/2024 0734   AJAY: How many times in the past year have you...    Used an illegal drug or used a prescription medication for non-medical reasons? Never Filed at: 03/12/2024 0734                  Medical Decision Making  78yoM with hx of CHF, pHTN, mitral regurgitation, Afib on xarelto, DMT2 presents to the hospital due to increasing sob, bilateral leg swelling and increased weight gain over the last 6 months.     Ddx includes but is not limited to: CHF exacerbation, worsening valvular disease, worsening pulmonary HTN, liver disease, protein deficiency, considered PE (although less likely with compliant hx of taking his xarelto, no missed doses) , ACS/MI,  PNA, PTX, pleural effusion    On exam, patient has good color and tone, is awake, alert, speaking in full sentences, in no acute distress. Normal work of breathing. Hemodynamically stable 156/71, afebrile, non tachycardic with a HR in the 60s, appears afib on the monitor, normal work of breathing and looks comfortable laying at about 45 degrees on ER stretcher, satting 93% on RA. Crackles noticed at the bilateral bases. Pitting edema to bilateral LEs. CBC without a leukocytosis. CMP shows FRANCESCO and elevated BUN -- likely prerenal in etiology in the setting of volume overload and 3rd spacing. . CXR without evidence of a PNA, PTX or pleural effusions. Initial troponin is 22 and EKG without evidence of acute ischemia, bumped troponin likely secondary to demand.     Gave 40mg IV lasix. Admitted to medicine for continued diuresis and monitoring of renal function in the setting of FRANCESCO. Patient amenable with this plan.     Amount and/or Complexity of Data Reviewed  Labs: ordered. Decision-making details documented in ED Course.  Radiology: ordered.    Risk  Prescription drug management.  Decision regarding hospitalization.          Disposition  Final diagnoses:   CHF exacerbation (HCC)   FRANCESCO (acute kidney injury) (HCC)     Time reflects when diagnosis was documented in both MDM as applicable and the Disposition within this note       Time User Action Codes Description Comment    3/12/2024  9:59 AM Sultana Camp Add [I50.9] CHF exacerbation (HCC)     3/12/2024  9:59 AM Sultana Camp Add [N17.9] FRANCESCO (acute kidney injury) (HCC)           ED Disposition       ED Disposition   Admit    Condition   Stable    Date/Time   Tue Mar 12, 2024 4753    Comment   Case was discussed with Pato and the patient's admission status was agreed to be Admission Status: inpatient status to the service of Dr. Whyte .               Follow-up Information    None         Current Discharge Medication List        START taking these  medications    Details   Empagliflozin (Jardiance) 10 MG TABS tablet Take 1 tablet (10 mg total) by mouth every morning Requires for CHF. Price check  Qty: 30 tablet, Refills: 0    Associated Diagnoses: CHF exacerbation (HCC)           CONTINUE these medications which have NOT CHANGED    Details   aspirin (ECOTRIN LOW STRENGTH) 81 mg EC tablet Take 81 mg by mouth daily      carvedilol (COREG) 6.25 mg tablet Take 1 tablet (6.25 mg total) by mouth 2 (two) times a day with meals  Qty: 60 tablet, Refills: 0    Associated Diagnoses: Acute exacerbation of CHF (congestive heart failure) (HCC)      ergocalciferol (VITAMIN D2) 50,000 units Take 50,000 Units by mouth once a week      furosemide (LASIX) 40 mg tablet Take 1 tablet (40 mg total) by mouth 2 (two) times a day  Qty: 60 tablet, Refills: 0    Associated Diagnoses: Acute exacerbation of CHF (congestive heart failure) (HCC)      losartan (COZAAR) 100 MG tablet Take 100 mg by mouth daily      metFORMIN (GLUCOPHAGE) 1000 MG tablet Take 1 tablet (1,000 mg total) by mouth 2 (two) times a day with meals Pt reports that he should be taking 1,000mg BID but takes total 2,000mg once a day  Refills: 0    Associated Diagnoses: Acute exacerbation of CHF (congestive heart failure) ([facility])      potassium chloride (K-DUR,KLOR-CON) 20 mEq tablet Take 1 tablet (20 mEq total) by mouth daily Do not start before May 10, 2023.  Qty: 30 tablet, Refills: 0    Associated Diagnoses: Acute exacerbation of CHF (congestive heart failure) (HCC)      rivaroxaban (Xarelto) 20 mg tablet Take 1 tablet (20 mg total) by mouth daily with breakfast  Qty: 30 tablet, Refills: 0    Associated Diagnoses: Atrial fibrillation (HCC)      rosuvastatin (CRESTOR) 10 MG tablet Take 10 mg by mouth daily           No discharge procedures on file.    PDMP Review       None             ED Provider  Attending physically available and evaluated Adrian [place] Debbie. I managed the patient along with the ED  Attending.    Electronically Signed by           Sultana Camp MD  03/14/24 7743

## 2024-03-12 NOTE — ASSESSMENT & PLAN NOTE
Wt Readings from Last 3 Encounters:   03/12/24 126 kg (277 lb 12.5 oz)   06/19/23 110 kg (242 lb 8 oz)   05/30/23 110 kg (243 lb)     Lab Results   Component Value Date    LVEF 50 05/07/2023     (H) 03/12/2024     (H) 05/06/2023       Presents with increased shortness of breath, dyspnea on exertion, lower extremity edema, cough with productive sputum, and increased abdominal girth.  Significant JVD for 5-6 weeks.  Patient is currently volume overloaded.  Home medications: Coreg 6.25 mg twice daily, Lasix 40 mg twice daily.  Dry weight likely: 242 lbs -248 lbs.  lbs.  Reported noncompliance with fluid restriction.  Of note, patient with known pulmonary hypertension, RV dysfunction but denied RHC before.    Plan:  GDMT: Diuretic: IV lasix 80mg TID for now, B-Blocker: Coreg 6.25 mg BID, ACE/ARB/ARNi: Losartan on hold.  Sodium restriction 2g  BMP BID, magnesium tomorrow a.m; Goal Mg > 2 and K > 4; Replete prn  HOB > 30°, Daily standing weights, Measure I/O  Monitor on Tele  Consult nutrition for dietary education  Consult cardiology, recs appreciated.  Repeat Echo.  GDMT if BP able per cardiology.  US abd to eval ascites. IR paracentesis if able & if patient is agreeable.

## 2024-03-12 NOTE — ASSESSMENT & PLAN NOTE
Onset on 5/2023 with rate controlled.  Continue Home meds: Coreg 6.25mg BID and Xarelto 20 mg daily.

## 2024-03-12 NOTE — ED ATTENDING ATTESTATION
3/12/2024  IZuleima DO, saw and evaluated the patient. I have discussed the patient with the resident/non-physician practitioner and agree with the resident's/non-physician practitioner's findings, Plan of Care, and MDM as documented in the resident's/non-physician practitioner's note, except where noted. All available labs and Radiology studies were reviewed.  I was present for key portions of any procedure(s) performed by the resident/non-physician practitioner and I was immediately available to provide assistance.       At this point I agree with the current assessment done in the Emergency Department.  I have conducted an independent evaluation of this patient a history and physical is as follows:    ED Course   78 year old male presents to the ED for evaluation of 1 month history of worsening bilateral lower extremity edema, 30 lb weight gain, dyspnea at rest and with exertion.  Patient continues to work as a  and has had difficulty completing his jobs due to dyspnea and fatigue.  Patient's friend at the bedside provides translation he noticed that he has had increased fatigue and increased work of breathing.  Patient is having difficulty closing his pants due to abdominal distention.  Patient denies chest pain.  No recent fevers or chills.    PmHX: atrial fibrillation, CHF, mitral regurgitation    Physical exam: Patient is awake and alert, resting in no acute distress.  Pupils are equal and reactive.  Mucous membranes are slightly dry.  Neck is supple.  Heart is regular rate 2 out of 6 systolic ejection murmur.  Abdomen is softly distended there is pitting edema of the sacrum, bilateral lower extremities.  Rales at both bases noted.  No increased work of breathing.    Assessment:  78 year old male presents with weight gain, edema and shortness of breath.  Differential diagnosis includes was not limited to acute exacerbation of congestive heart failure, renal failure, venous stasis changes,  electrolyte abnormality, cardiac dysrhythmia.  Plan:  will check EKG, CXR, BNP, labs.  Patient has significant weight gain likely secondary to fluid overload, layton start diuresis, reassess.    Wt Readings from Last 3 Encounters:   03/12/24 126 kg (277 lb 12.5 oz)   06/19/23 110 kg (242 lb 8 oz)   05/30/23 110 kg (243 lb)        Critical Care Time  Procedures

## 2024-03-12 NOTE — ASSESSMENT & PLAN NOTE
"Lab Results   Component Value Date    HGBA1C 6.3 (H) 05/06/2023       No results for input(s): \"POCGLU\" in the last 72 hours.    Blood Sugar Average: Last 72 hrs:  Home medication: Metformin 1000 mg twice daily.    Plan:  ISS Algo 4 with Accu-Chek 4 times daily.  Diabetic diet.  Hypoglycemic protocol.    "

## 2024-03-12 NOTE — ASSESSMENT & PLAN NOTE
Home medications: Coreg 6.25 mg twice daily, losartan 100 mg daily.  Continue Coreg 6.5 mg twice daily.  Hold losartan in the setting of FRANCESCO and allow higher dose of diuresis.

## 2024-03-13 LAB
ALBUMIN SERPL BCP-MCNC: 3.7 G/DL (ref 3.5–5)
ALP SERPL-CCNC: 123 U/L (ref 34–104)
ALT SERPL W P-5'-P-CCNC: 16 U/L (ref 7–52)
ANION GAP SERPL CALCULATED.3IONS-SCNC: 7 MMOL/L (ref 4–13)
ANION GAP SERPL CALCULATED.3IONS-SCNC: 8 MMOL/L (ref 4–13)
AST SERPL W P-5'-P-CCNC: 30 U/L (ref 13–39)
BILIRUB DIRECT SERPL-MCNC: 0.46 MG/DL (ref 0–0.2)
BILIRUB SERPL-MCNC: 1.51 MG/DL (ref 0.2–1)
BUN SERPL-MCNC: 32 MG/DL (ref 5–25)
BUN SERPL-MCNC: 36 MG/DL (ref 5–25)
CALCIUM SERPL-MCNC: 9 MG/DL (ref 8.4–10.2)
CALCIUM SERPL-MCNC: 9.2 MG/DL (ref 8.4–10.2)
CHLORIDE SERPL-SCNC: 100 MMOL/L (ref 96–108)
CHLORIDE SERPL-SCNC: 96 MMOL/L (ref 96–108)
CO2 SERPL-SCNC: 34 MMOL/L (ref 21–32)
CO2 SERPL-SCNC: 39 MMOL/L (ref 21–32)
CREAT SERPL-MCNC: 1.26 MG/DL (ref 0.6–1.3)
CREAT SERPL-MCNC: 1.43 MG/DL (ref 0.6–1.3)
ERYTHROCYTE [DISTWIDTH] IN BLOOD BY AUTOMATED COUNT: 16.9 % (ref 11.6–15.1)
GFR SERPL CREATININE-BSD FRML MDRD: 46 ML/MIN/1.73SQ M
GFR SERPL CREATININE-BSD FRML MDRD: 54 ML/MIN/1.73SQ M
GLUCOSE SERPL-MCNC: 114 MG/DL (ref 65–140)
GLUCOSE SERPL-MCNC: 118 MG/DL (ref 65–140)
GLUCOSE SERPL-MCNC: 124 MG/DL (ref 65–140)
GLUCOSE SERPL-MCNC: 124 MG/DL (ref 65–140)
GLUCOSE SERPL-MCNC: 125 MG/DL (ref 65–140)
HCT VFR BLD AUTO: 44.4 % (ref 36.5–49.3)
HGB BLD-MCNC: 13.9 G/DL (ref 12–17)
MAGNESIUM SERPL-MCNC: 1.9 MG/DL (ref 1.9–2.7)
MCH RBC QN AUTO: 32.3 PG (ref 26.8–34.3)
MCHC RBC AUTO-ENTMCNC: 31.3 G/DL (ref 31.4–37.4)
MCV RBC AUTO: 103 FL (ref 82–98)
PLATELET # BLD AUTO: 164 THOUSANDS/UL (ref 149–390)
PMV BLD AUTO: 10.2 FL (ref 8.9–12.7)
POTASSIUM SERPL-SCNC: 3.9 MMOL/L (ref 3.5–5.3)
POTASSIUM SERPL-SCNC: 4.1 MMOL/L (ref 3.5–5.3)
PROT SERPL-MCNC: 6.2 G/DL (ref 6.4–8.4)
RBC # BLD AUTO: 4.3 MILLION/UL (ref 3.88–5.62)
SODIUM SERPL-SCNC: 141 MMOL/L (ref 135–147)
SODIUM SERPL-SCNC: 143 MMOL/L (ref 135–147)
WBC # BLD AUTO: 7.25 THOUSAND/UL (ref 4.31–10.16)

## 2024-03-13 PROCEDURE — 99232 SBSQ HOSP IP/OBS MODERATE 35: CPT | Performed by: HOSPITALIST

## 2024-03-13 PROCEDURE — 85027 COMPLETE CBC AUTOMATED: CPT

## 2024-03-13 PROCEDURE — 80048 BASIC METABOLIC PNL TOTAL CA: CPT

## 2024-03-13 PROCEDURE — 99232 SBSQ HOSP IP/OBS MODERATE 35: CPT | Performed by: INTERNAL MEDICINE

## 2024-03-13 PROCEDURE — 80076 HEPATIC FUNCTION PANEL: CPT

## 2024-03-13 PROCEDURE — 83735 ASSAY OF MAGNESIUM: CPT

## 2024-03-13 PROCEDURE — 82948 REAGENT STRIP/BLOOD GLUCOSE: CPT

## 2024-03-13 RX ORDER — DIPHENHYDRAMINE HCL 25 MG
12.5 TABLET ORAL
Status: DISCONTINUED | OUTPATIENT
Start: 2024-03-13 | End: 2024-03-15 | Stop reason: HOSPADM

## 2024-03-13 RX ORDER — FUROSEMIDE 10 MG/ML
80 INJECTION INTRAMUSCULAR; INTRAVENOUS
Status: DISCONTINUED | OUTPATIENT
Start: 2024-03-13 | End: 2024-03-14

## 2024-03-13 RX ORDER — LOSARTAN POTASSIUM 50 MG/1
50 TABLET ORAL DAILY
Status: DISCONTINUED | OUTPATIENT
Start: 2024-03-13 | End: 2024-03-15

## 2024-03-13 RX ORDER — POLYETHYLENE GLYCOL 3350 17 G/17G
17 POWDER, FOR SOLUTION ORAL DAILY PRN
Status: DISCONTINUED | OUTPATIENT
Start: 2024-03-13 | End: 2024-03-14

## 2024-03-13 RX ADMIN — RIVAROXABAN 20 MG: 20 TABLET, FILM COATED ORAL at 08:24

## 2024-03-13 RX ADMIN — CARVEDILOL 6.25 MG: 6.25 TABLET, FILM COATED ORAL at 08:24

## 2024-03-13 RX ADMIN — PRAVASTATIN SODIUM 80 MG: 80 TABLET ORAL at 17:11

## 2024-03-13 RX ADMIN — FUROSEMIDE 80 MG: 10 INJECTION, SOLUTION INTRAMUSCULAR; INTRAVENOUS at 12:52

## 2024-03-13 RX ADMIN — FUROSEMIDE 80 MG: 10 INJECTION, SOLUTION INTRAMUSCULAR; INTRAVENOUS at 05:42

## 2024-03-13 RX ADMIN — FUROSEMIDE 80 MG: 10 INJECTION, SOLUTION INTRAMUSCULAR; INTRAVENOUS at 17:11

## 2024-03-13 RX ADMIN — LOSARTAN POTASSIUM 50 MG: 50 TABLET, FILM COATED ORAL at 17:11

## 2024-03-13 RX ADMIN — SENNOSIDES 17.2 MG: 8.6 TABLET, FILM COATED ORAL at 21:55

## 2024-03-13 RX ADMIN — ASPIRIN 81 MG: 81 TABLET, COATED ORAL at 08:23

## 2024-03-13 RX ADMIN — CARVEDILOL 6.25 MG: 6.25 TABLET, FILM COATED ORAL at 17:11

## 2024-03-13 NOTE — PROGRESS NOTES
General Cardiology   Progress Note -  [facility]   Adrian MEGHA Jean-Baptisteangela 78 y.o. male MRN: [id number]    Unit/Bed#: S -01 Encounter: [id number]    Assessment  1. 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   2. Moderate to severe mitral valve regurgitation (posterior leaflet tethering, moderate to severe posteriorly directed MR)  3. RV dilatation/RV dysfunction, severe PH/RVOT systolic notching.  -Volume overloaded on exam/imaging upon admission.  Gradually improving.  -BNP level 583.  -Chest x-ray PA/lateral-pulmonary vascular congestion.  -TTE 3/12/2024; LVEF 45%, mild global hypokinesis with regional variation, diastolic function is abnormal, RV moderately dilated/systolic function moderately reduced, LA severely dilated, RA severely dilated, mild AI, moderate to severe MR, severe TR; estimated RV systolic pressure 68 mmHg.  -Outpatient diuretic regimen; Lasix 40 mg twice daily  -Inpatient diuretic regimen; IV Lasix 80 mg TID  -24-hour JERICHO balance; -6.7 L overall -8.6 L  -Baseline dry weight around 242 pounds.  Pt reports recent weight of 330 pounds at home ?,  Weight 277 pounds on admission, currently 257 pounds.  4. Atrial fibrillation, unclear classification, newly diagnosed 5/2023; possibly chronic at this juncture.  -ECG on admission demonstrated rate controlled atrial fibrillation.  -Telemetry reviewed-rate controlled atrial fibrillation  -On carvedilol 6.25 mg twice daily  -On Xarelto 20 mg daily for systemic AC.  5. Hypertension  -Average /71 last recorded 116/67, HR 65.  -Outpatient BP regimen; carvedilol 6.25 mg twice daily  -Inpatient BP regimen; carvedilol 6.25 mg twice daily   6. Dyslipidemia  -Lipid profile 5/6/2023; cholesterol 104, triglyceride 64, HDL 44 and LDL calculated 47.  -Previously on rosuvastatin 10 mg daily PTA, nonformulary, was switched to pravastatin 80 mg daily this admission.  7. DM type II  -HgbA1c level 6.3.     Plan  -Significant  improvement in volume/respiratory status over the past 24 hours with aggressive IV diuresis.  He still examines volume overloaded and remains approximately 15 pounds above his dry weight.  No current supplemental O2 requirements.  -Continue IV furosemide 80 mg TID.  Hemodynamics, renal function and electrolytes all remain stable.  -Continue carvedilol 6.25 mg twice daily  -Continue Xarelto 20 mg daily.  -Strict I's and O's, daily standing weights, 2 g Na+ diet 1.8 LFR.  -Monitor renal function and electrolytes closely.  Replete to maintain K+ level 4.0 magnesium level 2.0.  -No indication for telemetry.    Subjective  Review of Systems   Constitutional: Positive for malaise/fatigue. Negative for chills and fever.   Eyes:  Negative for visual disturbance.   Cardiovascular:  Positive for leg swelling. Negative for chest pain, dyspnea on exertion, orthopnea and palpitations.   Respiratory:  Negative for cough and shortness of breath.    Gastrointestinal:  Positive for bloating.   Neurological:  Negative for dizziness, headaches and light-headedness.       Objective:   Physical Exam  Vitals and nursing note reviewed.   Constitutional:       General: He is not in acute distress.     Appearance: He is not diaphoretic.   HENT:      Head: Normocephalic and atraumatic.   Eyes:      General: No scleral icterus.  Neck:      Comments: JVD  Cardiovascular:      Rate and Rhythm: Normal rate. Rhythm irregular.      Pulses: Normal pulses.      Heart sounds: Normal heart sounds.   Pulmonary:      Effort: Pulmonary effort is normal.      Breath sounds: Normal breath sounds. No wheezing or rales.   Abdominal:      General: Bowel sounds are normal. There is distension.      Palpations: Abdomen is soft.      Tenderness: There is abdominal tenderness.   Musculoskeletal:         General: Swelling present.      Right lower leg: Edema present.      Left lower leg: Edema present.   Skin:     General: Skin is warm and dry.      Capillary  Refill: Capillary refill takes less than 2 seconds.   Neurological:      Mental Status: He is alert.   Psychiatric:         Mood and Affect: Mood normal.         Vitals: Blood pressure 116/67, pulse 65, temperature (!) 97.4 °F (36.3 °C), resp. rate 18, height 6' (1.829 m), weight 117 kg (257 lb 9.6 oz), SpO2 90%.,     Body mass index is 34.94 kg/m².,   Systolic (24hrs), Av , Min:113 , Max:156     Diastolic (24hrs), Av, Min:55, Max:87      Intake/Output Summary (Last 24 hours) at 3/13/2024 1128  Last data filed at 3/13/2024 0917  Gross per 24 hour   Intake 530 ml   Output 8080 ml   Net -7550 ml     Weight (last 2 days)       Date/Time Weight    24 0559 117 (257.6)    24 1130 126 (277)    24 0705 126 (277.78)            LABORATORY RESULTS      CBC with diff:   Results from last 7 days   Lab Units 24  0557 24  0752   WBC Thousand/uL 7.25 8.21   HEMOGLOBIN g/dL 13.9 15.1   HEMATOCRIT % 44.4 49.3   MCV fL 103* 106*   PLATELETS Thousands/uL 164 187   RBC Million/uL 4.30 4.64   MCH pg 32.3 32.5   MCHC g/dL 31.3* 30.6*   RDW % 16.9* 16.9*   MPV fL 10.2 10.3   NRBC AUTO /100 WBCs  --  0       CMP:  Results from last 7 days   Lab Units 24  1038 24  0557 24  1828 24  0752   POTASSIUM mmol/L 4.1  --  4.2 4.6   CHLORIDE mmol/L 100  --  102 104   CO2 mmol/L 34*  --  33* 32   BUN mg/dL 32*  --  35* 35*   CREATININE mg/dL 1.26  --  1.42* 1.41*   CALCIUM mg/dL 9.0  --  9.1 9.3   AST U/L  --  30  --  32   ALT U/L  --  16  --  19   ALK PHOS U/L  --  123*  --  143*   EGFR ml/min/1.73sq m 54  --  47 47       BMP:  Results from last 7 days   Lab Units 24  1038 24  1828 24  0752   POTASSIUM mmol/L 4.1 4.2 4.6   CHLORIDE mmol/L 100 102 104   CO2 mmol/L 34* 33* 32   BUN mg/dL 32* 35* 35*   CREATININE mg/dL 1.26 1.42* 1.41*   CALCIUM mg/dL 9.0 9.1 9.3       Lab Results   Component Value Date    NTBNP 416 05/15/2023        Results from last 7 days   Lab Units  "03/13/24  0557 03/12/24  0752   MAGNESIUM mg/dL 1.9 2.3                         Lipid Profile:   No results found for: \"CHOL\"  Lab Results   Component Value Date    HDL 44 05/06/2023     Lab Results   Component Value Date    LDLCALC 47 05/06/2023     Lab Results   Component Value Date    TRIG 64 05/06/2023       Cardiac testing:   No results found for this or any previous visit.    No results found for this or any previous visit.    No results found for this or any previous visit.    No valid procedures specified.  No results found for this or any previous visit.      Meds/Allergies   all current active meds have been reviewed and current meds:   Current Facility-Administered Medications   Medication Dose Route Frequency    acetaminophen (TYLENOL) tablet 650 mg  650 mg Oral Q4H PRN    aspirin (ECOTRIN LOW STRENGTH) EC tablet 81 mg  81 mg Oral Daily    carvedilol (COREG) tablet 6.25 mg  6.25 mg Oral BID With Meals    diphenhydrAMINE (BENADRYL) tablet 12.5 mg  12.5 mg Oral HS PRN    furosemide (LASIX) injection 80 mg  80 mg Intravenous TID (diuretic)    insulin lispro (HumALOG/ADMELOG) 100 units/mL subcutaneous injection 2-12 Units  2-12 Units Subcutaneous 4x Daily (AC & HS)    polyethylene glycol (MIRALAX) packet 17 g  17 g Oral Daily PRN    pravastatin (PRAVACHOL) tablet 80 mg  80 mg Oral Daily With Dinner    rivaroxaban (XARELTO) tablet 20 mg  20 mg Oral Daily With Breakfast    saliva substitute (MOUTH KOTE) mucosal solution 5 spray  5 spray Mouth/Throat 4x Daily PRN    senna (SENOKOT) tablet 17.2 mg  2 tablet Oral HS            Assessment:  Principal Problem:    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  Greater than 50% of total time was spent with the patient and / or family counseling and / or coordination of care.      ** Please Note: Dragon 360 Dictation voice to text software may have been used in the creation of this document. **

## 2024-03-13 NOTE — ASSESSMENT & PLAN NOTE
Onset on 5/2023 with rate controlled.  Continue Home meds: Coreg 6.25mg BID and Xarelto 20 mg daily.  Pulse: 58

## 2024-03-13 NOTE — ASSESSMENT & PLAN NOTE
Lab Results   Component Value Date    CREATININE 1.42 (H) 03/12/2024    CREATININE 1.41 (H) 03/12/2024    CREATININE 1.00 05/15/2023     POA 1.41.  Baseline around 1.  Likely second to fluid overload, cardiorenal.  Reported good urine output.  Received 40 mg Lasix IV in the ED.    Plan:  UA w/ microscopic to eval cast, Daily weights, I/O  Diuresis per CHF.  Monitor BMP BID while on high dose diuresis.  Avoid hypoperfusion of the kidneys, minimize nephrotoxins- if near euvolemic.  RAAS Blockers/Diuretics held: Losartan

## 2024-03-13 NOTE — ASSESSMENT & PLAN NOTE
Lab Results   Component Value Date    TBILI 1.51 (H) 03/13/2024     With normal LFTs.  Suspected 2/2 liver congestion in the setting of CHF AE.  Monitor for now.

## 2024-03-13 NOTE — UTILIZATION REVIEW
Initial Clinical Review    Admission: Date/Time/Statement:   Admission Orders (From admission, onward)       Ordered        03/12/24 1000  INPATIENT ADMISSION  Once                          Orders Placed This Encounter   Procedures    INPATIENT ADMISSION     Standing Status:   Standing     Number of Occurrences:   1     Order Specific Question:   Level of Care     Answer:   Med Surg [16]     Order Specific Question:   Estimated length of stay     Answer:   More than 2 Midnights     Order Specific Question:   Certification     Answer:   I certify that inpatient services are medically necessary for this patient for a duration of greater than two midnights. See H&P and MD Progress Notes for additional information about the patient's course of treatment.     ED Arrival Information       Expected   -    Arrival   3/12/2024 06:52    Acuity   Urgent              Means of arrival   Wheelchair    Escorted by   Friend    Service   Hospitalist    Admission type   Emergency              Arrival complaint   SOB/Legs swelling             Chief Complaint   Patient presents with    Shortness of Breath     Hx of CHF, increased SOB on exertion & bilat leg swelling/pain. +ambulatory dysfunction.       Initial Presentation: 78 y.o. male with a PMH of hypertension, hyperlipidemia, A-fib on Xarelto, combined systolic and diastolic CHF (last EF 50% with wall motion abdominally, RV dysfunction with pulmonary hypertension), T2DM on metformin who presents with shortness of breath in the past 5 to 6 weeks. Patient reported he has experiencing worsening shortness of breath, BAHENA with occasional palpitation in the past 5 to 6 weeks. Reported compliance with medications. Never skipped a dosage. However, friend at bedside reported patient was not compliant with his fluid restriction. He was drinking a lot of water. Unclear of diet. Denied any canned foods. Reported he was not able to lay flat overnight and had to sleep on the chair in the past few  weeks and also noticed his abdominal girth got larger and he was not able to wear his belt. + orthopnea, lower extremity edema and weight gain. Plan: Inpatient admission for evaluation and treatment of CHF, elevated bilirubin, FRANCESCO with creatinine of 1.41 (baseline 1), Afib, HTNDM: IV lasix 80 mg tid, Coreg, losartan on hold, 2 gm Na diet with 1500 ml fluid restriction, BMP bid, Mg level in am, telemetry, Cardiology consult, Echo, US Abd to evaluate ascites, continue Xarelto, diabetic diet with SSI.     Cardiology consult: follow TTE, continue IV lasix 80 mg tid, continue carvedilol and Xarelto, follow renal function and electrolytes.     Date: 3/13   Day 2:     Internal medicine: continue IV lasix 80 mg tid, Coreg, losartan, Xarelto, 2 gm Na diet with 1500 ml fluid restriction, BMP bid, telemetry, Echo,.    Cardiology: continue IV lasix 80 mg tid, consider addition of SGLT2 inhibitor, decrease losartan to 50 mg.     ED Triage Vitals   Temperature Pulse Respirations Blood Pressure SpO2   03/12/24 0708 03/12/24 0705 03/12/24 0705 03/12/24 0705 03/12/24 0705   (!) 94.4 °F (34.7 °C) 63 22 156/71 93 %      Temp Source Heart Rate Source Patient Position - Orthostatic VS BP Location FiO2 (%)   03/12/24 0708 03/12/24 0705 03/12/24 0705 03/12/24 0705 --   Oral Monitor Sitting Right arm       Pain Score       03/12/24 1511       No Pain          Wt Readings from Last 1 Encounters:   03/13/24 117 kg (257 lb 9.6 oz)     Additional Vital Signs:     Date/Time Temp Pulse Resp BP MAP (mmHg) SpO2 O2 Device   03/13/24 05:46:24 -- 65 -- 116/67 83 90 % --   03/13/24 0100 -- -- -- -- -- -- None (Room air)   03/12/24 23:41:47 97.4 °F (36.3 °C) Abnormal  63 18 114/63 80 92 % --   03/12/24 15:17:48 -- 59 19 156/83 107 89 % Abnormal  --   03/12/24 1400 -- 69 18 113/55 79 91 % None (Room air)   03/12/24 1130 -- 68 -- 156/87 -- -- --   03/12/24 1030 -- 68 20 156/87 115 93 % None (Room air)   03/12/24 1000 -- 65 20 162/78 112 94 % None (Room  air)   03/12/24 0900 -- 64 22 171/91 Abnormal  125 95 % None (Room air)   03/12/24 0750 -- 63 22 151/81 -- 93 % None (Room air)   03/12/24 0740 97.7 °F (36.5 °C) -- -- -- -- -- --   03/12/24 0708 94.4 °F (34.7 °C) Abnormal  -- -- -- -- -- --     Pertinent Labs/Diagnostic Test Results:   US abdomen limited   Final Result by Juany Kyle MD (03/12 1514)   Present ascites, as above.                  Workstation performed: [id number]         XR chest 2 views   Final Result by Arvind Lucas MD (03/12 1358)      Mild pulmonary vascular congestion and small right pleural effusion. Markedly enlarged cardiac silhouette similar to prior study may reflect underlying cardiomegaly or pericardial effusion.            Workstation performed: [id number]           3/12 Echo:  Interpretation Summary     Left Ventricle: Left ventricular cavity size is normal. Wall thickness is moderately increased. The left ventricular ejection fraction is 45%. Systolic function is mildly reduced. There is mild global hypokinesis with regional variation. Diastolic function is abnormal.    Right Ventricle: Right ventricular cavity size is moderately dilated. Systolic function is moderately reduced.    Left Atrium: The atrium is severely dilated.    Right Atrium: The atrium is severely dilated.    Aortic Valve: There is mild regurgitation.    Mitral Valve: There is moderate to severe regurgitation with an eccentrically directed jet.    Tricuspid Valve: There is severe regurgitation. The estimated right ventricular systolic pressure is 68.00 mmHg.    Pulmonic Valve: There is mild regurgitation.    Pericardium: There is a trivial pericardial effusion.    3/12 EKG:  Atrial fibrillation  Left axis deviation  Inferior infarct (cited on or before 06-MAY-2023)  Anterolateral infarct (cited on or before 06-MAY-2023)  Abnormal ECG  When compared with ECG of 06-MAY-2023 07:24,  No significant change was found    Results from last 7 days   Lab Units  03/12/24  0752   SARS-COV-2  Negative     Results from last 7 days   Lab Units 03/13/24  0557 03/12/24  0752   WBC Thousand/uL 7.25 8.21   HEMOGLOBIN g/dL 13.9 15.1   HEMATOCRIT % 44.4 49.3   PLATELETS Thousands/uL 164 187   NEUTROS ABS Thousands/µL  --  5.83         Results from last 7 days   Lab Units 03/13/24  1038 03/13/24  0557 03/12/24  1828 03/12/24  0752   SODIUM mmol/L 141  --  142 142   POTASSIUM mmol/L 4.1  --  4.2 4.6   CHLORIDE mmol/L 100  --  102 104   CO2 mmol/L 34*  --  33* 32   ANION GAP mmol/L 7  --  7 6   BUN mg/dL 32*  --  35* 35*   CREATININE mg/dL 1.26  --  1.42* 1.41*   EGFR ml/min/1.73sq m 54  --  47 47   CALCIUM mg/dL 9.0  --  9.1 9.3   MAGNESIUM mg/dL  --  1.9  --  2.3     Results from last 7 days   Lab Units 03/13/24  0557 03/12/24  0752   AST U/L 30 32   ALT U/L 16 19   ALK PHOS U/L 123* 143*   TOTAL PROTEIN g/dL 6.2* 7.2   ALBUMIN g/dL 3.7 4.2   TOTAL BILIRUBIN mg/dL 1.51* 1.38*   BILIRUBIN DIRECT mg/dL 0.46*  --      Results from last 7 days   Lab Units 03/12/24  2100 03/12/24  1616 03/12/24  1311   POC GLUCOSE mg/dl 103 128 99     Results from last 7 days   Lab Units 03/13/24  1038 03/12/24  1828 03/12/24  0752   GLUCOSE RANDOM mg/dL 114 171* 148*             Results from last 7 days   Lab Units 03/12/24  1209 03/12/24  1003 03/12/24  0752   HS TNI 0HR ng/L  --   --  22   HS TNI 2HR ng/L  --  18  --    HSTNI D2 ng/L  --  -4  --    HS TNI 4HR ng/L 18  --   --    HSTNI D4 ng/L -4  --   --            Results from last 7 days   Lab Units 03/12/24  0752   BNP pg/mL 583*           Results from last 7 days   Lab Units 03/12/24  1209   CLARITY UA  Clear   COLOR UA  Colorless   SPEC GRAV UA  1.008   PH UA  5.0   GLUCOSE UA mg/dl Negative   KETONES UA mg/dl Negative   BLOOD UA  Negative   PROTEIN UA mg/dl Negative   NITRITE UA  Negative   BILIRUBIN UA  Negative   UROBILINOGEN UA (BE) mg/dl <2.0   LEUKOCYTES UA  Negative   WBC UA /hpf 1-2   RBC UA /hpf 1-2   BACTERIA UA /hpf None Seen    EPITHELIAL CELLS WET PREP /hpf None Seen     Results from last 7 days   Lab Units 03/12/24  0752   INFLUENZA A PCR  Negative   INFLUENZA B PCR  Negative   RSV PCR  Negative         ED Treatment:   Medication Administration from 03/12/2024 0652 to 03/12/2024 1434         Date/Time Order Dose Route Action     03/12/2024 0754 EDT furosemide (LASIX) injection 40 mg 40 mg Intravenous Given     03/12/2024 0753 EDT losartan (COZAAR) tablet 100 mg 100 mg Oral Given     03/12/2024 0754 EDT rivaroxaban (XARELTO) tablet 20 mg 20 mg Oral Given     03/12/2024 1210 EDT furosemide (LASIX) injection 80 mg 80 mg Intravenous Given     03/12/2024 1210 EDT aspirin (ECOTRIN LOW STRENGTH) EC tablet 81 mg 81 mg Oral Given          Past Medical History:   Diagnosis Date    Diabetes mellitus ([facility])     Hypertension      Present on Admission:   Type 2 diabetes mellitus with hyperglycemia, without long-term current use of insulin ([facility])   Primary hypertension   Atrial fibrillation ([facility])   Acute on chronic combined systolic and diastolic heart failure with pulmonary hypertension      Admitting Diagnosis: CHF exacerbation ([facility]) [I50.9]  FRANCESCO (acute kidney injury) ([facility]) [N17.9]  Age/Sex: 78 y.o. male  Admission Orders:  Scheduled Medications:  aspirin, 81 mg, Oral, Daily  carvedilol, 6.25 mg, Oral, BID With Meals  furosemide, 80 mg, Intravenous, TID (diuretic)  insulin lispro, 2-12 Units, Subcutaneous, 4x Daily (AC & HS)  pravastatin, 80 mg, Oral, Daily With Dinner  rivaroxaban, 20 mg, Oral, Daily With Breakfast  senna, 2 tablet, Oral, HS      Continuous IV Infusions:     PRN Meds:  acetaminophen, 650 mg, Oral, Q4H PRN  diphenhydrAMINE, 12.5 mg, Oral, HS PRN  polyethylene glycol, 17 g, Oral, Daily PRN  saliva substitute, 5 spray, Mouth/Throat, 4x Daily PRN        IP CONSULT TO NUTRITION SERVICES  IP CONSULT TO CARDIOLOGY    Network Utilization Review Department  ATTENTION: Please call with any questions or concerns to [phone] and carefully  listen to the prompts so that you are directed to the right person. All voicemails are confidential.   For Discharge needs, contact Care Management DC Support Team at [phone] opt. [phone]  Send all requests for admission clinical reviews, approved or denied determinations and any other requests to dedicated fax number below belonging to the campus where the patient is receiving treatment. List of dedicated fax numbers for the Facilities:  FACILITY NAME UR FAX NUMBER   ADMISSION DENIALS (Administrative/Medical Necessity) [phone]   DISCHARGE SUPPORT TEAM (NETWORK) [phone]   PARENT CHILD HEALTH (Maternity/NICU/Pediatrics) [phone]   [facility] [phone]   [facility] [phone]   [facility] [phone]   [facility] [phone]   Replaced by [facility] [phone]   [facility] [phone]   [facility] [phone]   [facility] [phone]   [facility] [phone]   [facility] [phone]   [facility] [phone]   [facility] [phone]

## 2024-03-13 NOTE — PROGRESS NOTES
[facility]  Progress Note  Name: Adrian Lewis I  MRN: [id number]  Unit/Bed#: S MS Vishnu-01 I Date of Admission: 3/12/2024   Date of Service: 3/13/2024 I Hospital Day: 1    Assessment/Plan   * Acute on chronic combined systolic and diastolic heart failure with pulmonary hypertension  Assessment & Plan  Wt Readings from Last 3 Encounters:   03/12/24 126 kg (277 lb 12.5 oz)   06/19/23 110 kg (242 lb 8 oz)   05/30/23 110 kg (243 lb)     Lab Results   Component Value Date    LVEF 50 05/07/2023     (H) 03/12/2024     (H) 05/06/2023       Presents with increased shortness of breath, dyspnea on exertion, lower extremity edema, cough with productive sputum, and increased abdominal girth.  Significant JVD for 5-6 weeks.  Patient is currently volume overloaded.  Home medications: Coreg 6.25 mg twice daily, Lasix 40 mg twice daily.  Dry weight likely: 242 lbs -248 lbs.  lbs.  Reported noncompliance with fluid restriction.  Of note, patient with known pulmonary hypertension, RV dysfunction but denied RHC before.    Plan:  GDMT: Diuretic: IV lasix 80mg TID for now, B-Blocker: Coreg 6.25 mg BID, ACE/ARB/ARNi: Losartan on hold.  Sodium restriction 2g. FR 1500cc daily.  BMP BID, magnesium tomorrow a.m; Goal Mg > 2 and K > 4; Replete prn  HOB > 30°, Daily standing weights, Measure I/O  Monitor on Tele  Consult nutrition for dietary education  Consult cardiology, recs appreciated.  Repeat Echo.  GDMT if BP able per cardiology.  US abd to eval ascites. IR paracentesis if able & if patient is agreeable.    Primary hypertension  Assessment & Plan  Home medications: Coreg 6.25 mg twice daily, losartan 100 mg daily.  Continue Coreg 6.5 mg twice daily.  Hold losartan in the setting of FRANCESCO and allow higher dose of diuresis.  Blood Pressure: 130/68      FRANCESCO (acute kidney injury) (HCC)  Assessment & Plan  Lab Results   Component Value Date    CREATININE 1.42 (H) 03/12/2024    CREATININE 1.41 (H)  03/12/2024    CREATININE 1.00 05/15/2023     POA 1.41.  Baseline around 1.  Likely second to fluid overload, cardiorenal.  Reported good urine output.  Received 40 mg Lasix IV in the ED.    Plan:  UA w/ microscopic to eval cast, Daily weights, I/O  Diuresis per CHF.  Monitor BMP BID while on high dose diuresis.  Avoid hypoperfusion of the kidneys, minimize nephrotoxins- if near euvolemic.  RAAS Blockers/Diuretics held: Losartan      Atrial fibrillation (HCC)  Assessment & Plan  Onset on 5/2023 with rate controlled.  Continue Home meds: Coreg 6.25mg BID and Xarelto 20 mg daily.  Pulse: 58      Elevated bilirubin  Assessment & Plan  Lab Results   Component Value Date    TBILI 1.51 (H) 03/13/2024     With normal LFTs.  Suspected 2/2 liver congestion in the setting of CHF AE.  Monitor for now.    Type 2 diabetes mellitus with hyperglycemia, without long-term current use of insulin (HCC)  Assessment & Plan  Lab Results   Component Value Date    HGBA1C 6.3 (H) 05/06/2023       Recent Labs     03/12/24  1311 03/12/24  1616 03/12/24  2100   POCGLU 99 128 103       Blood Sugar Average: Last 72 hrs:  (P) 110Home medication: Metformin 1000 mg twice daily.    Plan:  ISS Algo 4 with Accu-Chek 4 times daily.  Diabetic diet.  Hypoglycemic protocol.                 VTE Pharmacologic Prophylaxis: VTE Score: 6 High Risk (Score >/= 5) - Pharmacological DVT Prophylaxis Ordered: rivaroxaban (Xarelto). Sequential Compression Devices Ordered.    Mobility:   JH-HLM Achieved: 6: Walk 10 steps or more  HLM Goal achieved. Continue to encourage appropriate mobility.    Patient Centered Rounds: I performed bedside rounds with nursing staff today.  Discussions with Specialists or Other Care Team Provider: Consulted cardiology.  awaiting recommendations    Education and Discussions with Family / Patient: Updated  (friend) via phone.    Current Length of Stay: 1 day(s)  Current Patient Status: Inpatient   Discharge Plan: Anticipate  discharge in 48 hrs to home.    Code Status: Level 1 - Full Code    Subjective:   pt is doing well.  No overnight events    Objective:     Vitals:   Temp (24hrs), Av.4 °F (36.3 °C), Min:97.4 °F (36.3 °C), Max:97.4 °F (36.3 °C)    Temp:  [97.4 °F (36.3 °C)] 97.4 °F (36.3 °C)  HR:  [58-65] 58  Resp:  [16-18] 16  BP: (114-130)/(63-68) 130/68  SpO2:  [90 %-93 %] 93 %  Body mass index is 34.94 kg/m².     Input and Output Summary (last 24 hours):     Intake/Output Summary (Last 24 hours) at 3/13/2024 1642  Last data filed at 3/13/2024 1530  Gross per 24 hour   Intake 360 ml   Output 7300 ml   Net -6940 ml       Physical Exam:   Physical Exam  Vitals and nursing note reviewed.   Constitutional:       General: He is not in acute distress.     Appearance: He is well-developed. He is obese. He is not ill-appearing.   HENT:      Head: Normocephalic and atraumatic.      Nose: Nose normal.      Mouth/Throat:      Mouth: Mucous membranes are moist.      Pharynx: No oropharyngeal exudate or posterior oropharyngeal erythema.   Eyes:      General:         Right eye: No discharge.         Left eye: No discharge.      Extraocular Movements: Extraocular movements intact.   Neck:      Comments: Significant JVD  Cardiovascular:      Rate and Rhythm: Normal rate. Rhythm irregular.      Pulses: Normal pulses.      Heart sounds: Murmur heard.   Pulmonary:      Effort: Pulmonary effort is normal. No respiratory distress.      Breath sounds: Rales present. No wheezing or rhonchi.      Comments: Bibasilar diminished breath sounds. Scattered rales.  Chest:      Chest wall: No tenderness.   Abdominal:      General: There is distension (significant).      Tenderness: There is no abdominal tenderness. There is no guarding or rebound.      Comments: Fluid wave+  Abdominal wall with 1-2+ pitting edema   Musculoskeletal:         General: No tenderness.      Cervical back: Normal range of motion and neck supple. No tenderness.      Right lower  leg: Edema present.      Left lower leg: Edema present.      Comments: Bilateral lower extremity 3+ edema up to mid thigh level.   Lymphadenopathy:      Cervical: No cervical adenopathy.   Skin:     General: Skin is warm and dry.      Capillary Refill: Capillary refill takes less than 2 seconds.   Neurological:      Mental Status: He is alert and oriented to person, place, and time.   Psychiatric:         Mood and Affect: Mood normal.         Behavior: Behavior normal.         Thought Content: Thought content normal.         Judgment: Judgment normal.          Additional Data:     Labs:  Results from last 7 days   Lab Units 03/13/24  0557 03/12/24  0752   WBC Thousand/uL 7.25 8.21   HEMOGLOBIN g/dL 13.9 15.1   HEMATOCRIT % 44.4 49.3   PLATELETS Thousands/uL 164 187   NEUTROS PCT %  --  69   LYMPHS PCT %  --  18   MONOS PCT %  --  9   EOS PCT %  --  2     Results from last 7 days   Lab Units 03/13/24  1038 03/13/24  0557   SODIUM mmol/L 141  --    POTASSIUM mmol/L 4.1  --    CHLORIDE mmol/L 100  --    CO2 mmol/L 34*  --    BUN mg/dL 32*  --    CREATININE mg/dL 1.26  --    ANION GAP mmol/L 7  --    CALCIUM mg/dL 9.0  --    ALBUMIN g/dL  --  3.7   TOTAL BILIRUBIN mg/dL  --  1.51*   ALK PHOS U/L  --  123*   ALT U/L  --  16   AST U/L  --  30   GLUCOSE RANDOM mg/dL 114  --          Results from last 7 days   Lab Units 03/13/24  1619 03/13/24  1135 03/12/24  2100 03/12/24  1616 03/12/24  1311   POC GLUCOSE mg/dl 124 118 103 128 99               Lines/Drains:  Invasive Devices       Peripheral Intravenous Line  Duration             Peripheral IV 03/13/24 Right;Ventral (anterior) Forearm <1 day                      Telemetry:  Telemetry Orders (From admission, onward)               24 Hour Telemetry Monitoring  Continuous x 24 Hours (Telem)        Question:  Reason for 24 Hour Telemetry  Answer:  Decompensated CHF- and any one of the following: continuous diuretic infusion or total diuretic dose >200 mg daily, associated  electrolyte derangement (I.e. K < 3.0), ionotropic drip (continuous infusion), hx of ventricular arrhythmia, or new EF < 35%                     Telemetry Reviewed: Sinus Tachycardia and Atrial fibrillation. HR averaging 89  Indication for Continued Telemetry Use: Arrthymias requiring medical therapy             Imaging: Reviewed radiology reports from this admission including: chest xray    Recent Cultures (last 7 days):         Last 24 Hours Medication List:   Current Facility-Administered Medications   Medication Dose Route Frequency Provider Last Rate    acetaminophen  650 mg Oral Q4H PRN Chuck Olson MD      aspirin  81 mg Oral Daily Chuck Olson MD      carvedilol  6.25 mg Oral BID With Meals Chuck Olson MD      diphenhydrAMINE  12.5 mg Oral HS PRN Kiana Lagunas MD      furosemide  80 mg Intravenous TID (diuretic) Kiana Lagunas MD      insulin lispro  2-12 Units Subcutaneous 4x Daily (AC & HS) Chuck Olson MD      losartan  50 mg Oral Daily Dre Guardado MD      polyethylene glycol  17 g Oral Daily PRN Kiana Lagunas MD      pravastatin  80 mg Oral Daily With Dinner Chuck Olson MD      rivaroxaban  20 mg Oral Daily With Breakfast Chuck Olson MD      saliva substitute  5 spray Mouth/Throat 4x Daily PRN Chuck Olson MD      senna  2 tablet Oral HS Chuck Olson MD          Today, Patient Was Seen By: Kiana Lagunas MD    **Please Note: This note may have been constructed using a voice recognition system.**

## 2024-03-13 NOTE — ASSESSMENT & PLAN NOTE
Home medications: Coreg 6.25 mg twice daily, losartan 100 mg daily.  Continue Coreg 6.5 mg twice daily.  Hold losartan in the setting of FRANCESCO and allow higher dose of diuresis.  Blood Pressure: 130/68

## 2024-03-13 NOTE — ASSESSMENT & PLAN NOTE
Lab Results   Component Value Date    HGBA1C 6.3 (H) 05/06/2023       Recent Labs     03/12/24  1311 03/12/24  1616 03/12/24  2100   POCGLU 99 128 103       Blood Sugar Average: Last 72 hrs:  (P) 110Home medication: Metformin 1000 mg twice daily.    Plan:  ISS Algo 4 with Accu-Chek 4 times daily.  Diabetic diet.  Hypoglycemic protocol.

## 2024-03-14 PROBLEM — J90 PLEURAL EFFUSION: Status: ACTIVE | Noted: 2024-03-14

## 2024-03-14 PROBLEM — R18.8 ASCITES: Status: ACTIVE | Noted: 2024-03-14

## 2024-03-14 PROBLEM — K59.09 OTHER CONSTIPATION: Status: ACTIVE | Noted: 2024-03-14

## 2024-03-14 LAB
GLUCOSE SERPL-MCNC: 102 MG/DL (ref 65–140)
GLUCOSE SERPL-MCNC: 108 MG/DL (ref 65–140)
GLUCOSE SERPL-MCNC: 137 MG/DL (ref 65–140)
GLUCOSE SERPL-MCNC: 145 MG/DL (ref 65–140)
GLUCOSE SERPL-MCNC: 146 MG/DL (ref 65–140)
GLUCOSE SERPL-MCNC: 174 MG/DL (ref 65–140)

## 2024-03-14 PROCEDURE — 99232 SBSQ HOSP IP/OBS MODERATE 35: CPT | Performed by: HOSPITALIST

## 2024-03-14 PROCEDURE — 82948 REAGENT STRIP/BLOOD GLUCOSE: CPT

## 2024-03-14 PROCEDURE — 99232 SBSQ HOSP IP/OBS MODERATE 35: CPT | Performed by: INTERNAL MEDICINE

## 2024-03-14 RX ORDER — POLYETHYLENE GLYCOL 3350 17 G/17G
17 POWDER, FOR SOLUTION ORAL ONCE
Status: COMPLETED | OUTPATIENT
Start: 2024-03-14 | End: 2024-03-14

## 2024-03-14 RX ORDER — BISACODYL 10 MG
10 SUPPOSITORY, RECTAL RECTAL DAILY PRN
Status: DISCONTINUED | OUTPATIENT
Start: 2024-03-14 | End: 2024-03-15 | Stop reason: HOSPADM

## 2024-03-14 RX ADMIN — POLYETHYLENE GLYCOL 3350 17 G: 17 POWDER, FOR SOLUTION ORAL at 09:28

## 2024-03-14 RX ADMIN — RIVAROXABAN 20 MG: 20 TABLET, FILM COATED ORAL at 08:12

## 2024-03-14 RX ADMIN — CARVEDILOL 6.25 MG: 6.25 TABLET, FILM COATED ORAL at 08:11

## 2024-03-14 RX ADMIN — CARVEDILOL 6.25 MG: 6.25 TABLET, FILM COATED ORAL at 16:05

## 2024-03-14 RX ADMIN — ASPIRIN 81 MG: 81 TABLET, COATED ORAL at 08:10

## 2024-03-14 RX ADMIN — LOSARTAN POTASSIUM 50 MG: 50 TABLET, FILM COATED ORAL at 08:11

## 2024-03-14 RX ADMIN — SENNOSIDES 17.2 MG: 8.6 TABLET, FILM COATED ORAL at 21:59

## 2024-03-14 RX ADMIN — PRAVASTATIN SODIUM 80 MG: 80 TABLET ORAL at 16:05

## 2024-03-14 RX ADMIN — FUROSEMIDE 80 MG: 10 INJECTION, SOLUTION INTRAMUSCULAR; INTRAVENOUS at 05:09

## 2024-03-14 NOTE — ASSESSMENT & PLAN NOTE
Lab Results   Component Value Date    HGBA1C 6.3 (H) 05/06/2023       Recent Labs     03/13/24  1135 03/13/24  1619 03/13/24  2118 03/14/24  0729   POCGLU 118 124 124 102       Blood Sugar Average: Last 72 hrs:  (P) 114Home medication: Metformin 1000 mg twice daily.    Plan:  ISS Algo 4 with Accu-Chek 4 times daily.  Diabetic diet.  Hypoglycemic protocol.

## 2024-03-14 NOTE — CASE MANAGEMENT
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 05/15/2023    Suspected sleep apnea 2023    Class 2 obesity in adult 2023    Pulmonary hypertension (HCC) 2023    Mitral regurgitation, moderate to severe 2023    Type 2 diabetes mellitus with hyperglycemia, without long-term current use of insulin (HCC) 2023    Primary hypertension 2023    Atrial fibrillation ([facility]) 2023      LOS (days): 2  Geometric Mean LOS (GMLOS) (days): 3.9  Days to GMLOS:1.9     OBJECTIVE:    Risk of Unplanned Readmission Score: 12.66         Current admission status: Inpatient       Preferred Pharmacy:   [facility] [address] [place] [id number]  Phone: [phone] Fax: [phone]    Primary Care Provider: Haven Allred MD    Primary Insurance: BLUE CROSS  Secondary Insurance:     ASSESSMENT:  Active Health Care Proxies    There are no active Health Care Proxies on file.                      Patient Information  Admitted from:: Home  Mental Status: Alert  During Assessment patient was accompanied by: Not accompanied during assessment  Assessment information provided by:: Friend  Primary Caregiver: Self  Support Systems: Friend  County of Residence: [place]  What city do you live in?: [place]  Home entry access options. Select all that apply.: Stairs  Number of steps to enter home.: One Flight  Type of Current  Residence: Apartment  Floor Level: 2  Upon entering residence, is there a bedroom on the main floor (no further steps)?: Yes  Upon entering residence, is there a bathroom on the main floor (no further steps)?: Yes  Living Arrangements: Lives Alone    Activities of Daily Living Prior to Admission  Functional Status: Independent  Completes ADLs independently?: Yes  Ambulates independently?: Yes  Does patient use assisted devices?: No  Does patient currently own DME?: No  Does patient have a history of Outpatient Therapy (PT/OT)?: No  Does the patient have a history of Short-Term Rehab?: No  Does patient have a history of HHC?: No  Does patient currently have HHC?: No         Patient Information Continued  Income Source: Employed  Does patient have prescription coverage?: Yes  Does patient receive dialysis treatments?: No         Means of Transportation  Means of Transport to Appts:: Drives Self    CM s/w pt's friend Neville re: pt assessment. Neville relayed pt lives in a one room apartment within a hotel building on the second level as pt has been renting from a friend for the last 10-15 years. Neville reported pt is independent w/ ADLS, does not use nor own any DME, has no hx of PT/OT and drives self to appointments. Neville relayed pt is employed as a . Neville confirmed pt PCP ([facility]) and preferred pharmacy ([facility]). Friend aware CM placed OP CM referral due to pt CHF diagnosis. Neville relayed he will be visiting pt later today - pt aware of same. No CM d/c needs identified at this time, CM remains available.     Social Determinants of Health (SDOH)      Flowsheet Row Most Recent Value   Housing Stability    In the last 12 months, was there a time when you were not able to pay the mortgage or rent on time? N   In the last 12 months, was there a time when you did not have a steady place to sleep or slept in a shelter (including now)? N   Transportation Needs    In the past 12 months, has lack of transportation kept  you from medical appointments or from getting medications? no   In the past 12 months, has lack of transportation kept you from meetings, work, or from getting things needed for daily living? No   Food Insecurity    Within the past 12 months, you worried that your food would run out before you got the money to buy more. Never true   Within the past 12 months, the food you bought just didn't last and you didn't have money to get more. Never true   Utilities    In the past 12 months has the electric, gas, oil, or water company threatened to shut off services in your home? No

## 2024-03-14 NOTE — ASSESSMENT & PLAN NOTE
Onset on 5/2023 with rate controlled.  Continue Home meds: Coreg 6.25mg BID and Xarelto 20 mg daily.  Pulse: 64

## 2024-03-14 NOTE — PLAN OF CARE
Problem: DISCHARGE PLANNING  Goal: Discharge to home or other facility with appropriate resources  Description: INTERVENTIONS:  - Identify barriers to discharge w/patient and caregiver  - Arrange for needed discharge resources and transportation as appropriate  - Identify discharge learning needs (meds, wound care, etc.)  - Arrange for interpretive services to assist at discharge as needed  - Refer to Case Management Department for coordinating discharge planning if the patient needs post-hospital services based on physician/advanced practitioner order or complex needs related to functional status, cognitive ability, or social support system  Outcome: Progressing     Problem: Knowledge Deficit  Goal: Patient/family/caregiver demonstrates understanding of disease process, treatment plan, medications, and discharge instructions  Description: Complete learning assessment and assess knowledge base.  Interventions:  - Provide teaching at level of understanding  - Provide teaching via preferred learning methods  Outcome: Progressing     Problem: METABOLIC, FLUID AND ELECTROLYTES - ADULT  Goal: Fluid balance maintained  Description: INTERVENTIONS:  - Monitor labs   - Monitor I/O and WT  - Instruct patient on fluid and nutrition as appropriate  - Assess for signs & symptoms of volume excess or deficit  Outcome: Progressing

## 2024-03-14 NOTE — ASSESSMENT & PLAN NOTE
Wt Readings from Last 3 Encounters:   03/14/24 109 kg (239 lb 6.7 oz)   06/19/23 110 kg (242 lb 8 oz)   05/30/23 110 kg (243 lb)     Lab Results   Component Value Date    LVEF 45 03/12/2024     (H) 03/12/2024     (H) 05/06/2023       Presents with increased shortness of breath, dyspnea on exertion, lower extremity edema, cough with productive sputum, and increased abdominal girth.  Significant JVD for 5-6 weeks.  Patient is currently volume overloaded.  Home medications: Coreg 6.25 mg twice daily, Lasix 40 mg twice daily.  Dry weight likely: 242 lbs -248 lbs.  lbs.  Reported noncompliance with fluid restriction.  Of note, patient with known pulmonary hypertension, RV dysfunction but denied RHC before.  Net IO Since Admission: -14,270 mL [03/14/24 0903]     Plan:  GDMT:   Diuretic: was on IV lasix 80mg TID.  On hold due to increasing creatinine levels.   Per cardiology plan to start him on furosemide 40 mg twice daily (this was his prior home dose) and add spironolactone 25 mg daily and Jardiance 10 mg daily    B-Blocker: Coreg 6.25 mg BID,   ACE/ARB/ARNi: Losartan on hold.  Jardiance price check.   Sodium restriction 2g. FR 1500cc daily.  BMP BID, magnesium tomorrow a.m; Goal Mg > 2 and K > 4; Replete prn  HOB > 30°, Daily standing weights, Measure I/O  Monitor on Tele  Consult nutrition for dietary education  Consult cardiology, recs appreciated.  Repeat Echo.  GDMT if BP able per cardiology.  US abd to eval ascites. IR paracentesis if able & if patient is agreeable.

## 2024-03-14 NOTE — ASSESSMENT & PLAN NOTE
Lab Results   Component Value Date    CREATININE 1.43 (H) 03/13/2024    CREATININE 1.26 03/13/2024    CREATININE 1.42 (H) 03/12/2024     POA 1.41.  Baseline around 1.  Likely second to fluid overload, cardiorenal.  Reported good urine output.  Received 40 mg Lasix IV in the ED.    Plan:  UA w/ microscopic to eval cast, Daily weights, I/O  Diuresis per CHF.  Monitor BMP BID while on high dose diuresis.  Avoid hypoperfusion of the kidneys, minimize nephrotoxins- if near euvolemic.  RAAS Blockers/Diuretics held: Losartan

## 2024-03-14 NOTE — PROGRESS NOTES
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  Significant JVD for 5-6 weeks.  Patient is currently volume overloaded.  Home medications: Coreg 6.25 mg twice daily, Lasix 40 mg twice daily.  Dry weight likely: 242 lbs -248 lbs.  lbs.  Reported noncompliance with fluid restriction.  Of note, patient with known pulmonary hypertension, RV dysfunction but denied RHC before.  Net IO Since Admission: -14,270 mL [03/14/24 0903]     Plan:  GDMT:   Diuretic: was on IV lasix 80mg TID.  On hold due to increasing creatinine levels.   Per cardiology plan to start him on furosemide 40 mg twice daily (this was his prior home dose) and add spironolactone 25 mg daily and Jardiance 10 mg daily    B-Blocker: Coreg 6.25 mg BID,   ACE/ARB/ARNi: Losartan on hold.  Jardiance price check.   Sodium restriction 2g. FR 1500cc daily.  BMP BID, magnesium tomorrow a.m; Goal Mg > 2 and K > 4; Replete prn  HOB > 30°, Daily standing weights, Measure I/O  Monitor on Tele  Consult nutrition for dietary education  Consult cardiology, recs appreciated.  Repeat Echo.  GDMT if BP able per cardiology.  US abd to eval ascites. IR paracentesis if able & if patient is agreeable.    Primary hypertension  Assessment & Plan  Home medications: Coreg 6.25 mg twice daily, losartan 100 mg  daily.  Continue Coreg 6.5 mg twice daily.  Hold losartan in the setting of FRANCESCO and allow higher dose of diuresis.  Blood Pressure: 131/85      FRANCESCO (acute kidney injury) (HCC)  Assessment & Plan  Lab Results   Component Value Date    CREATININE 1.43 (H) 03/13/2024    CREATININE 1.26 03/13/2024    CREATININE 1.42 (H) 03/12/2024     POA 1.41.  Baseline around 1.  Likely second to fluid overload, cardiorenal.  Reported good urine output.  Received 40 mg Lasix IV in the ED.    Plan:  UA w/ microscopic to eval cast, Daily weights, I/O  Diuresis per CHF.  Monitor BMP BID while on high dose diuresis.  Avoid hypoperfusion of the kidneys, minimize nephrotoxins- if near euvolemic.  RAAS Blockers/Diuretics held: Losartan      Atrial fibrillation (HCC)  Assessment & Plan  Onset on 5/2023 with rate controlled.  Continue Home meds: Coreg 6.25mg BID and Xarelto 20 mg daily.  Pulse: 64      Other constipation  Assessment & Plan  Senna daily  Miralax prn  Rectal suppository today    Pleural effusion  Assessment & Plan  Chest x ray: Mild pulmonary vascular congestion and small right pleural effusion. Markedly enlarged cardiac silhouette similar to prior study may reflect underlying cardiomegaly or pericardial effusion.       Ascites  Assessment & Plan  3/12/24: Present ascites     Elevated bilirubin  Assessment & Plan  Lab Results   Component Value Date    TBILI 1.51 (H) 03/13/2024     With normal LFTs.  Suspected 2/2 liver congestion in the setting of CHF AE.  Monitor for now.    Type 2 diabetes mellitus with hyperglycemia, without long-term current use of insulin ([facility])  Assessment & Plan  Lab Results   Component Value Date    HGBA1C 6.3 (H) 05/06/2023       Recent Labs     03/13/24  1135 03/13/24  1619 03/13/24  2118 03/14/24  0729   POCGLU 118 124 124 102       Blood Sugar Average: Last 72 hrs:  (P) 114Home medication: Metformin 1000 mg twice daily.    Plan:  ISS Algo 4 with Accu-Chek 4 times daily.  Diabetic diet.  Hypoglycemic  protocol.                 VTE Pharmacologic Prophylaxis: VTE Score: 6 High Risk (Score >/= 5) - Pharmacological DVT Prophylaxis Ordered: rivaroxaban (Xarelto). Sequential Compression Devices Ordered.    Mobility:   JH-HLM Achieved: 7: Walk 25 feet or more  HLM Goal achieved. Continue to encourage appropriate mobility.    Patient Centered Rounds: I performed bedside rounds with nursing staff today.  Discussions with Specialists or Other Care Team Provider: Cardiology    Education and Discussions with Family / Patient: Updated  (friend) via phone.    Current Length of Stay: 2 day(s)  Current Patient Status: Inpatient   Discharge Plan: Anticipate discharge in 48 hrs to home.    Code Status: Level 1 - Full Code    Subjective:   Pt is doing well. No overnight events.    Objective:     Vitals:   Temp (24hrs), Av.4 °F (36.3 °C), Min:97.3 °F (36.3 °C), Max:97.6 °F (36.4 °C)    Temp:  [97.3 °F (36.3 °C)-97.6 °F (36.4 °C)] 97.6 °F (36.4 °C)  HR:  [58-64] 62  Resp:  [16] 16  BP: (116-131)/(61-85) 116/61  SpO2:  [92 %-95 %] 95 %  Body mass index is 32.47 kg/m².     Input and Output Summary (last 24 hours):     Intake/Output Summary (Last 24 hours) at 3/14/2024 1251  Last data filed at 3/14/2024 1034  Gross per 24 hour   Intake 1078 ml   Output 7375 ml   Net -6297 ml       Physical Exam:   Physical Exam  Vitals and nursing note reviewed.   Constitutional:       General: He is not in acute distress.     Appearance: He is well-developed. He is obese. He is not ill-appearing.   HENT:      Head: Normocephalic and atraumatic.      Nose: Nose normal.      Mouth/Throat:      Mouth: Mucous membranes are moist.      Pharynx: No oropharyngeal exudate or posterior oropharyngeal erythema.   Eyes:      General:         Right eye: No discharge.         Left eye: No discharge.      Extraocular Movements: Extraocular movements intact.   Neck:      Comments: Significant JVD  Cardiovascular:      Rate and Rhythm: Normal rate.  Rhythm irregular.      Pulses: Normal pulses.      Heart sounds: Murmur heard.   Pulmonary:      Effort: Pulmonary effort is normal. No respiratory distress.      Breath sounds: Rales present. No wheezing or rhonchi.      Comments: Bibasilar diminished breath sounds. Scattered rales.  Chest:      Chest wall: No tenderness.   Abdominal:      General: There is distension (significant).      Tenderness: There is no abdominal tenderness. There is no guarding or rebound.      Comments: Fluid wave+  Abdominal wall with 1-2+ pitting edema   Musculoskeletal:         General: No tenderness.      Cervical back: Normal range of motion and neck supple. No tenderness.      Right lower leg: Edema present.      Left lower leg: Edema present.      Comments: Bilateral lower extremity 3+ edema up to mid thigh level.   Lymphadenopathy:      Cervical: No cervical adenopathy.   Skin:     General: Skin is warm and dry.      Capillary Refill: Capillary refill takes less than 2 seconds.   Neurological:      Mental Status: He is alert and oriented to person, place, and time.   Psychiatric:         Mood and Affect: Mood normal.         Behavior: Behavior normal.         Thought Content: Thought content normal.         Judgment: Judgment normal.          Additional Data:     Labs:  Results from last 7 days   Lab Units 03/13/24  0557 03/12/24  0752   WBC Thousand/uL 7.25 8.21   HEMOGLOBIN g/dL 13.9 15.1   HEMATOCRIT % 44.4 49.3   PLATELETS Thousands/uL 164 187   NEUTROS PCT %  --  69   LYMPHS PCT %  --  18   MONOS PCT %  --  9   EOS PCT %  --  2     Results from last 7 days   Lab Units 03/13/24  1950 03/13/24  1038 03/13/24  0557   SODIUM mmol/L 143   < >  --    POTASSIUM mmol/L 3.9   < >  --    CHLORIDE mmol/L 96   < >  --    CO2 mmol/L 39*   < >  --    BUN mg/dL 36*   < >  --    CREATININE mg/dL 1.43*   < >  --    ANION GAP mmol/L 8   < >  --    CALCIUM mg/dL 9.2   < >  --    ALBUMIN g/dL  --   --  3.7   TOTAL BILIRUBIN mg/dL  --   --  1.51*    ALK PHOS U/L  --   --  123*   ALT U/L  --   --  16   AST U/L  --   --  30   GLUCOSE RANDOM mg/dL 125   < >  --     < > = values in this interval not displayed.         Results from last 7 days   Lab Units 03/14/24  0729 03/13/24  2118 03/13/24  1619 03/13/24  1135 03/12/24  2100 03/12/24  1616 03/12/24  1311   POC GLUCOSE mg/dl 102 124 124 118 103 128 99               Lines/Drains:  Invasive Devices       Peripheral Intravenous Line  Duration             Peripheral IV 03/13/24 Right;Ventral (anterior) Forearm 1 day                          Imaging: Reviewed radiology reports from this admission including: chest xray    Recent Cultures (last 7 days):         Last 24 Hours Medication List:   Current Facility-Administered Medications   Medication Dose Route Frequency Provider Last Rate    acetaminophen  650 mg Oral Q4H PRN Chuck Olson MD      aspirin  81 mg Oral Daily Chuck Olson MD      carvedilol  6.25 mg Oral BID With Meals Chuck Olson MD      diphenhydrAMINE  12.5 mg Oral HS PRN Kiana Lagunas MD      insulin lispro  2-12 Units Subcutaneous 4x Daily (AC & HS) Chuck Olson MD      losartan  50 mg Oral Daily Dre Guardado MD      pravastatin  80 mg Oral Daily With Dinner Chuck Olson MD      rivaroxaban  20 mg Oral Daily With Breakfast Chuck Olson MD      saliva substitute  5 spray Mouth/Throat 4x Daily PRN Chuck Olson MD      senna  2 tablet Oral HS Chuck Olson MD          Today, Patient Was Seen By: Kiana Lagunas MD    **Please Note: This note may have been constructed using a voice recognition system.**

## 2024-03-14 NOTE — ASSESSMENT & PLAN NOTE
Chest x ray: Mild pulmonary vascular congestion and small right pleural effusion. Markedly enlarged cardiac silhouette similar to prior study may reflect underlying cardiomegaly or pericardial effusion.

## 2024-03-14 NOTE — PROGRESS NOTES
General Cardiology   Progress Note -  Team One   Adrian MEGHA Jean-Baptisteangela 78 y.o. male MRN: [id number]    Unit/Bed#: S -01 Encounter: [id number]    Assessment  1. 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   2. Moderate to severe mitral valve regurgitation (posterior leaflet tethering, moderate to severe posteriorly directed MR)  3. RV dilatation/RV dysfunction, severe PH/RVOT systolic notching.  -Volume overloaded on exam/imaging upon admission.  Gradually improving.  -BNP level 583.  -Chest x-ray PA/lateral-pulmonary vascular congestion.  -TTE 3/12/2024; LVEF 45%, mild global hypokinesis with regional variation, diastolic function is abnormal, RV moderately dilated/systolic function moderately reduced, LA severely dilated, RA severely dilated, mild AI, moderate to severe MR, severe TR; estimated RV systolic pressure 68 mmHg.  -Outpatient diuretic regimen; Lasix 40 mg twice daily  -Inpatient diuretic regimen; IV Lasix 80 mg TID  -24-hour JERICHO balance; -7.1 L overall -14.4 L  -Baseline dry weight around 242 pounds.  Pt reports recent weight of 330 pounds at home ?,  Weight 277 pounds on admission, currently 239 pounds.  4. Atrial fibrillation, unclear classification, newly diagnosed 5/2023; possibly chronic at this juncture.  -ECG on admission demonstrated rate controlled atrial fibrillation.  -Telemetry reviewed-rate controlled atrial fibrillation  -On carvedilol 6.25 mg twice daily  -On Xarelto 20 mg daily for systemic AC.  5. Hypertension  -Average /74 last recorded 131/85, HR 64  -Outpatient BP regimen; carvedilol 6.25 mg twice daily  -Inpatient BP regimen; carvedilol 6.25 mg twice daily   6. Dyslipidemia  -Lipid profile 5/6/2023; cholesterol 104, triglyceride 64, HDL 44 and LDL calculated 47.  -Previously on rosuvastatin 10 mg daily PTA, nonformulary, was switched to pravastatin 80 mg daily this admission.  7. DM type II  -HgbA1c level 6.3.     Plan  -Significant  improvement in volume/respiratory status over the past 24-48 hours with aggressive IV diuresis.  He appears euvolemic on exam this a.m.  He is approximately 3 pounds below his prior dry weights.  Creatinine did bump slightly to 1.4 from 1.26 yesterday.  Will hold further diuretics for today.  Will restart him back on oral diuretics in the a.m., we will utilize furosemide 40 mg twice daily (this was his prior home dose) however we will also add spironolactone 25 mg daily and Jardiance 10 mg daily (will request a price check from the primary team).  We can continue to uptitrate these goal-directed medical therapies in reference to his mild CM as an outpatient as his hemodynamics allow.  -Continue carvedilol 6.25 mg twice daily  -Continue losartan 50 mg daily.   -Continue Xarelto 20 mg daily.  -Strict I's and O's, daily standing weights, 2 g Na+ diet 1.8 LFR.  -Monitor renal function and electrolytes closely.  Replete to maintain K+ level 4.0 magnesium level 2.0.  -No indication for telemetry.    Subjective  Review of Systems   Constitutional: Negative for chills, fever and malaise/fatigue.   Eyes:  Negative for visual disturbance.   Cardiovascular:  Negative for chest pain, dyspnea on exertion, leg swelling, orthopnea and palpitations.   Respiratory:  Negative for cough and shortness of breath.    Gastrointestinal:  Negative for abdominal pain.   Neurological:  Negative for dizziness, headaches and light-headedness.       Objective:   Physical Exam  Vitals and nursing note reviewed.   Constitutional:       General: He is not in acute distress.     Appearance: He is obese. He is not diaphoretic.   HENT:      Head: Normocephalic and atraumatic.   Eyes:      General: No scleral icterus.  Cardiovascular:      Rate and Rhythm: Normal rate. Rhythm irregular.      Pulses: Normal pulses.   Pulmonary:      Effort: Pulmonary effort is normal.      Breath sounds: No wheezing or rales.   Abdominal:      Palpations: Abdomen is  soft.   Musculoskeletal:      Right lower leg: Edema present.      Left lower leg: Edema present.      Comments: Trace nonpitting bilateral lower extremity edema.   Skin:     General: Skin is warm and dry.      Capillary Refill: Capillary refill takes less than 2 seconds.   Neurological:      General: No focal deficit present.      Mental Status: He is alert and oriented to person, place, and time.   Psychiatric:         Mood and Affect: Mood normal.         Vitals: Blood pressure 131/85, pulse 64, temperature 97.6 °F (36.4 °C), resp. rate 16, height 6' (1.829 m), weight 109 kg (239 lb 6.7 oz), SpO2 92%.,     Body mass index is 32.47 kg/m².,   Systolic (24hrs), Av , Min:130 , Max:131     Diastolic (24hrs), Av, Min:68, Max:85      Intake/Output Summary (Last 24 hours) at 3/14/2024 0946  Last data filed at 3/14/2024 0934  Gross per 24 hour   Intake 1078 ml   Output 6950 ml   Net -5872 ml     Weight (last 2 days)       Date/Time Weight    24 0600 109 (239.42)    24 0559 117 (257.6)    24 1130 126 (277)    24 0705 126 (277.78)            LABORATORY RESULTS      CBC with diff:   Results from last 7 days   Lab Units 24  0557 24  0752   WBC Thousand/uL 7.25 8.21   HEMOGLOBIN g/dL 13.9 15.1   HEMATOCRIT % 44.4 49.3   MCV fL 103* 106*   PLATELETS Thousands/uL 164 187   RBC Million/uL 4.30 4.64   MCH pg 32.3 32.5   MCHC g/dL 31.3* 30.6*   RDW % 16.9* 16.9*   MPV fL 10.2 10.3   NRBC AUTO /100 WBCs  --  0       CMP:  Results from last 7 days   Lab Units 24  1950 24  1038 24  0557 24  1828 24  0752   POTASSIUM mmol/L 3.9 4.1  --  4.2 4.6   CHLORIDE mmol/L 96 100  --  102 104   CO2 mmol/L 39* 34*  --  33* 32   BUN mg/dL 36* 32*  --  35* 35*   CREATININE mg/dL 1.43* 1.26  --  1.42* 1.41*   CALCIUM mg/dL 9.2 9.0  --  9.1 9.3   AST U/L  --   --  30  --  32   ALT U/L  --   --  16  --  19   ALK PHOS U/L  --   --  123*  --  143*   EGFR ml/min/1.73sq m 46 54  --   "47 47       BMP:  Results from last 7 days   Lab Units 03/13/24  1950 03/13/24  1038 03/12/24  1828 03/12/24  0752   POTASSIUM mmol/L 3.9 4.1 4.2 4.6   CHLORIDE mmol/L 96 100 102 104   CO2 mmol/L 39* 34* 33* 32   BUN mg/dL 36* 32* 35* 35*   CREATININE mg/dL 1.43* 1.26 1.42* 1.41*   CALCIUM mg/dL 9.2 9.0 9.1 9.3       Lab Results   Component Value Date    NTBNP 416 05/15/2023        Results from last 7 days   Lab Units 03/13/24  0557 03/12/24  0752   MAGNESIUM mg/dL 1.9 2.3                         Lipid Profile:   No results found for: \"CHOL\"  Lab Results   Component Value Date    HDL 44 05/06/2023     Lab Results   Component Value Date    LDLCALC 47 05/06/2023     Lab Results   Component Value Date    TRIG 64 05/06/2023       Cardiac testing:   No results found for this or any previous visit.    No results found for this or any previous visit.    No results found for this or any previous visit.    No valid procedures specified.  No results found for this or any previous visit.      Meds/Allergies   all current active meds have been reviewed and current meds:   Current Facility-Administered Medications   Medication Dose Route Frequency    acetaminophen (TYLENOL) tablet 650 mg  650 mg Oral Q4H PRN    aspirin (ECOTRIN LOW STRENGTH) EC tablet 81 mg  81 mg Oral Daily    carvedilol (COREG) tablet 6.25 mg  6.25 mg Oral BID With Meals    diphenhydrAMINE (BENADRYL) tablet 12.5 mg  12.5 mg Oral HS PRN    furosemide (LASIX) injection 80 mg  80 mg Intravenous TID (diuretic)    insulin lispro (HumALOG/ADMELOG) 100 units/mL subcutaneous injection 2-12 Units  2-12 Units Subcutaneous 4x Daily (AC & HS)    losartan (COZAAR) tablet 50 mg  50 mg Oral Daily    pravastatin (PRAVACHOL) tablet 80 mg  80 mg Oral Daily With Dinner    rivaroxaban (XARELTO) tablet 20 mg  20 mg Oral Daily With Breakfast    saliva substitute (MOUTH KOTE) mucosal solution 5 spray  5 spray Mouth/Throat 4x Daily PRN    senna (SENOKOT) tablet 17.2 mg  2 tablet Oral " HS                Counseling / Coordination of Care  Total floor / unit time spent today 20 minutes.  Greater than 50% of total time was spent with the patient and / or family counseling and / or coordination of care.      ** Please Note: Dragon 360 Dictation voice to text software may have been used in the creation of this document. **

## 2024-03-14 NOTE — ASSESSMENT & PLAN NOTE
Home medications: Coreg 6.25 mg twice daily, losartan 100 mg daily.  Continue Coreg 6.5 mg twice daily.  Hold losartan in the setting of FRANCESCO and allow higher dose of diuresis.  Blood Pressure: 131/85

## 2024-03-15 VITALS
HEART RATE: 64 BPM | RESPIRATION RATE: 18 BRPM | SYSTOLIC BLOOD PRESSURE: 149 MMHG | WEIGHT: 233.6 LBS | HEIGHT: 72 IN | OXYGEN SATURATION: 95 % | BODY MASS INDEX: 31.64 KG/M2 | DIASTOLIC BLOOD PRESSURE: 75 MMHG | TEMPERATURE: 97.6 F

## 2024-03-15 LAB
ANION GAP SERPL CALCULATED.3IONS-SCNC: 7 MMOL/L (ref 4–13)
BUN SERPL-MCNC: 28 MG/DL (ref 5–25)
CALCIUM SERPL-MCNC: 8.9 MG/DL (ref 8.4–10.2)
CHLORIDE SERPL-SCNC: 98 MMOL/L (ref 96–108)
CO2 SERPL-SCNC: 35 MMOL/L (ref 21–32)
CREAT SERPL-MCNC: 1.04 MG/DL (ref 0.6–1.3)
ERYTHROCYTE [DISTWIDTH] IN BLOOD BY AUTOMATED COUNT: 16.6 % (ref 11.6–15.1)
GFR SERPL CREATININE-BSD FRML MDRD: 68 ML/MIN/1.73SQ M
GLUCOSE SERPL-MCNC: 104 MG/DL (ref 65–140)
GLUCOSE SERPL-MCNC: 161 MG/DL (ref 65–140)
GLUCOSE SERPL-MCNC: 94 MG/DL (ref 65–140)
HCT VFR BLD AUTO: 43.3 % (ref 36.5–49.3)
HGB BLD-MCNC: 13.7 G/DL (ref 12–17)
MAGNESIUM SERPL-MCNC: 2 MG/DL (ref 1.9–2.7)
MCH RBC QN AUTO: 32 PG (ref 26.8–34.3)
MCHC RBC AUTO-ENTMCNC: 31.6 G/DL (ref 31.4–37.4)
MCV RBC AUTO: 101 FL (ref 82–98)
PLATELET # BLD AUTO: 162 THOUSANDS/UL (ref 149–390)
PMV BLD AUTO: 9.9 FL (ref 8.9–12.7)
POTASSIUM SERPL-SCNC: 3.9 MMOL/L (ref 3.5–5.3)
RBC # BLD AUTO: 4.28 MILLION/UL (ref 3.88–5.62)
SODIUM SERPL-SCNC: 140 MMOL/L (ref 135–147)
WBC # BLD AUTO: 6.3 THOUSAND/UL (ref 4.31–10.16)

## 2024-03-15 PROCEDURE — 99232 SBSQ HOSP IP/OBS MODERATE 35: CPT | Performed by: INTERNAL MEDICINE

## 2024-03-15 PROCEDURE — 80048 BASIC METABOLIC PNL TOTAL CA: CPT | Performed by: NURSE PRACTITIONER

## 2024-03-15 PROCEDURE — 83735 ASSAY OF MAGNESIUM: CPT | Performed by: NURSE PRACTITIONER

## 2024-03-15 PROCEDURE — 99239 HOSP IP/OBS DSCHRG MGMT >30: CPT | Performed by: HOSPITALIST

## 2024-03-15 PROCEDURE — 82948 REAGENT STRIP/BLOOD GLUCOSE: CPT

## 2024-03-15 PROCEDURE — 85027 COMPLETE CBC AUTOMATED: CPT

## 2024-03-15 RX ORDER — FUROSEMIDE 80 MG
80 TABLET ORAL
Status: DISCONTINUED | OUTPATIENT
Start: 2024-03-15 | End: 2024-03-15 | Stop reason: HOSPADM

## 2024-03-15 RX ORDER — SENNOSIDES 8.6 MG
17.2 TABLET ORAL
Qty: 60 TABLET | Refills: 0 | Status: SHIPPED | OUTPATIENT
Start: 2024-03-15 | End: 2024-04-14

## 2024-03-15 RX ORDER — FUROSEMIDE 80 MG
80 TABLET ORAL 2 TIMES DAILY
Qty: 60 TABLET | Refills: 0 | Status: SHIPPED | OUTPATIENT
Start: 2024-03-15 | End: 2024-04-14

## 2024-03-15 RX ORDER — SPIRONOLACTONE 25 MG/1
25 TABLET ORAL DAILY
Status: DISCONTINUED | OUTPATIENT
Start: 2024-03-15 | End: 2024-03-15 | Stop reason: HOSPADM

## 2024-03-15 RX ORDER — FUROSEMIDE 40 MG/1
40 TABLET ORAL
Status: DISCONTINUED | OUTPATIENT
Start: 2024-03-15 | End: 2024-03-15

## 2024-03-15 RX ORDER — LOSARTAN POTASSIUM 50 MG/1
100 TABLET ORAL DAILY
Status: DISCONTINUED | OUTPATIENT
Start: 2024-03-15 | End: 2024-03-15 | Stop reason: HOSPADM

## 2024-03-15 RX ORDER — POTASSIUM CHLORIDE 20 MEQ/1
20 TABLET, EXTENDED RELEASE ORAL 2 TIMES DAILY
Status: DISCONTINUED | OUTPATIENT
Start: 2024-03-15 | End: 2024-03-15 | Stop reason: HOSPADM

## 2024-03-15 RX ORDER — FUROSEMIDE 40 MG/1
40 TABLET ORAL ONCE
Status: COMPLETED | OUTPATIENT
Start: 2024-03-15 | End: 2024-03-15

## 2024-03-15 RX ORDER — SPIRONOLACTONE 25 MG/1
25 TABLET ORAL DAILY
Qty: 30 TABLET | Refills: 0 | Status: SHIPPED | OUTPATIENT
Start: 2024-03-16 | End: 2024-04-15

## 2024-03-15 RX ADMIN — POTASSIUM CHLORIDE 20 MEQ: 1500 TABLET, EXTENDED RELEASE ORAL at 12:28

## 2024-03-15 RX ADMIN — ASPIRIN 81 MG: 81 TABLET, COATED ORAL at 09:24

## 2024-03-15 RX ADMIN — SPIRONOLACTONE 25 MG: 25 TABLET ORAL at 09:25

## 2024-03-15 RX ADMIN — INSULIN LISPRO 2 UNITS: 100 INJECTION, SOLUTION INTRAVENOUS; SUBCUTANEOUS at 12:34

## 2024-03-15 RX ADMIN — LOSARTAN POTASSIUM 100 MG: 50 TABLET, FILM COATED ORAL at 09:25

## 2024-03-15 RX ADMIN — CARVEDILOL 6.25 MG: 6.25 TABLET, FILM COATED ORAL at 09:25

## 2024-03-15 RX ADMIN — FUROSEMIDE 40 MG: 40 TABLET ORAL at 09:25

## 2024-03-15 RX ADMIN — FUROSEMIDE 40 MG: 40 TABLET ORAL at 12:27

## 2024-03-15 RX ADMIN — RIVAROXABAN 20 MG: 20 TABLET, FILM COATED ORAL at 09:25

## 2024-03-15 NOTE — UTILIZATION REVIEW
NOTIFICATION OF ADMISSION DISCHARGE   This is a Notification of Discharge from [facility]. Please be advised that this patient has been discharge from our facility. Below you will find the admission and discharge date and time including the patient’s disposition.   UTILIZATION REVIEW CONTACT:  Asa Jc  Utilization   Network Utilization Review Department  Phone: [phone] x carefully listen to the prompts. All voicemails are confidential.  Email: [email or web address]     ADMISSION INFORMATION  PRESENTATION DATE: 3/12/2024  7:00 AM  OBERVATION ADMISSION DATE:   INPATIENT ADMISSION DATE: 3/12/24 10:00 AM   DISCHARGE DATE: 3/15/2024  3:52 PM   DISPOSITION:Home/Self Care    Network Utilization Review Department  ATTENTION: Please call with any questions or concerns to [phone] and carefully listen to the prompts so that you are directed to the right person. All voicemails are confidential.   For Discharge needs, contact Care Management DC Support Team at [phone] opt. 2  Send all requests for admission clinical reviews, approved or denied determinations and any other requests to dedicated fax number below belonging to the campus where the patient is receiving treatment. List of dedicated fax numbers for the Facilities:  FACILITY NAME UR FAX NUMBER   ADMISSION DENIALS (Administrative/Medical Necessity) [phone]   DISCHARGE SUPPORT TEAM ([facility]) [phone]   PARENT CHILD HEALTH (Maternity/NICU/Pediatrics) [phone]   [facility] [phone]   [facility] [phone]   [facility] [phone]   [facility] [phone]   [facility] [phone]   [facility] [phone]   [facility] [phone]   [facility] [phone]   [facility]  [facility] [phone]   [facility] [phone]   [facility] [phone]   [facility] [phone]

## 2024-03-15 NOTE — PLAN OF CARE
Problem: DISCHARGE PLANNING  Goal: Discharge to home or other facility with appropriate resources  Description: INTERVENTIONS:  - Identify barriers to discharge w/patient and caregiver  - Arrange for needed discharge resources and transportation as appropriate  - Identify discharge learning needs (meds, wound care, etc.)  - Arrange for interpretive services to assist at discharge as needed  - Refer to Case Management Department for coordinating discharge planning if the patient needs post-hospital services based on physician/advanced practitioner order or complex needs related to functional status, cognitive ability, or social support system  3/15/2024 1430 by Soni Han RN  Outcome: Adequate for Discharge  3/15/2024 1104 by Soni Han RN  Outcome: Progressing     Problem: Knowledge Deficit  Goal: Patient/family/caregiver demonstrates understanding of disease process, treatment plan, medications, and discharge instructions  Description: Complete learning assessment and assess knowledge base.  Interventions:  - Provide teaching at level of understanding  - Provide teaching via preferred learning methods  3/15/2024 1430 by Soni Han RN  Outcome: Adequate for Discharge  3/15/2024 1104 by Soni Han RN  Outcome: Progressing     Problem: METABOLIC, FLUID AND ELECTROLYTES - ADULT  Goal: Fluid balance maintained  Description: INTERVENTIONS:  - Monitor labs   - Monitor I/O and WT  - Instruct patient on fluid and nutrition as appropriate  - Assess for signs & symptoms of volume excess or deficit  3/15/2024 1430 by Soni Han RN  Outcome: Adequate for Discharge  3/15/2024 1104 by Soni Han RN  Outcome: Progressing

## 2024-03-15 NOTE — ASSESSMENT & PLAN NOTE
3/12/24: Present ascites   Patient is not willing for paracentesis at this time.  Patient should follow-up with outpatient gastroenterology

## 2024-03-15 NOTE — ASSESSMENT & PLAN NOTE
Wt Readings from Last 3 Encounters:   03/15/24 106 kg (233 lb 9.6 oz)   06/19/23 110 kg (242 lb 8 oz)   05/30/23 110 kg (243 lb)     Lab Results   Component Value Date    LVEF 45 03/12/2024     (H) 03/12/2024     (H) 05/06/2023       Presents with increased shortness of breath, dyspnea on exertion, lower extremity edema, cough with productive sputum, and increased abdominal girth.  Significant JVD for 5-6 weeks.  Patient is currently volume overloaded.  Home medications: Coreg 6.25 mg twice daily, Lasix 40 mg twice daily.  Dry weight likely: 242 lbs -248 lbs.  lbs.  Reported noncompliance with fluid restriction.  Of note, patient with known pulmonary hypertension, RV dysfunction but denied RHC before.  Net IO Since Admission: -16,797 mL [03/15/24 1422]     Plan:  GDMT:   Diuretic: was on IV lasix 80mg TID.   On 3/14 Lasix were kept on hold due to increasing creatinine levels.   On 3/15, he was started on Lasix 80 mg twice daily due to increasing JVD and bilateral lower extremity edema.  Patient was not started on SGLT2 inhibitors during this hospitalization as patient was not able to afford.  It cost $200  B-Blocker: Coreg 6.25 mg BID,   ACE/ARB/ARNi: resume losartan  Sodium restriction 2g. FR 1500cc daily.  Goal Mg > 2 and K > 4;   GDMT if BP able per cardiology.

## 2024-03-15 NOTE — ASSESSMENT & PLAN NOTE
Chest x ray: Mild pulmonary vascular congestion and small right pleural effusion. Markedly enlarged cardiac silhouette similar to prior study may reflect underlying cardiomegaly or pericardial effusion.  Patient is currently asymptomatic.   Patient to follow-up with outpatient pulmonology/pcp for repeat chest x ray

## 2024-03-15 NOTE — DISCHARGE SUMMARY
[facility]  Discharge- Adrian Lewis 1945, 78 y.o. 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  Significant JVD for 5-6 weeks.  Patient is currently volume overloaded.  Home medications: Coreg 6.25 mg twice daily, Lasix 40 mg twice daily.  Dry weight likely: 242 lbs -248 lbs.  lbs.  Reported noncompliance with fluid restriction.  Of note, patient with known pulmonary hypertension, RV dysfunction but denied RHC before.  Net IO Since Admission: -16,797 mL [03/15/24 1422]     Plan:  GDMT:   Diuretic: was on IV lasix 80mg TID.   On 3/14 Lasix were kept on hold due to increasing creatinine levels.   On 3/15, he was started on Lasix 80 mg twice daily due to increasing JVD and bilateral lower extremity edema.  Patient was not started on SGLT2 inhibitors during this hospitalization as patient was not able to afford.  It cost $200  B-Blocker: Coreg 6.25 mg BID,   ACE/ARB/ARNi: resume losartan  Sodium restriction 2g. FR 1500cc daily.  Goal Mg > 2 and K > 4;   GDMT if BP able per cardiology.    Primary hypertension  Assessment & Plan  Home medications: Coreg 6.25 mg twice daily, losartan 100 mg daily.  Continue Coreg 6.5 mg twice daily.  Hold losartan in the setting of FRANCESCO and allow higher dose of diuresis.  Blood Pressure: 149/75      FRANCESCO (acute kidney injury)  ([facility])  Assessment & Plan  Lab Results   Component Value Date    CREATININE 1.04 03/15/2024    CREATININE 1.43 (H) 03/13/2024    CREATININE 1.26 03/13/2024     POA 1.41.  Baseline around 1.  Likely second to fluid overload, cardiorenal.  Reported good urine output.  Received 40 mg Lasix IV in the ED.    Plan:  UA w/ microscopic to eval cast, Daily weights, I/O  Diuresis per CHF.  Monitor BMP BID while on high dose diuresis.  Avoid hypoperfusion of the kidneys, minimize nephrotoxins- if near euvolemic.  RAAS Blockers/Diuretics held: Losartan      Atrial fibrillation (HCC)  Assessment & Plan  Onset on 5/2023 with rate controlled.  Continue Home meds: Coreg 6.25mg BID and Xarelto 20 mg daily.  Pulse: 64      Other constipation  Assessment & Plan  Senna daily  Miralax prn      Pleural effusion  Assessment & Plan  Chest x ray: Mild pulmonary vascular congestion and small right pleural effusion. Markedly enlarged cardiac silhouette similar to prior study may reflect underlying cardiomegaly or pericardial effusion.  Patient is currently asymptomatic.   Patient to follow-up with outpatient pulmonology/pcp for repeat chest x ray    Ascites  Assessment & Plan  3/12/24: Present ascites   Patient is not willing for paracentesis at this time.  Patient should follow-up with outpatient gastroenterology    Elevated bilirubin  Assessment & Plan  Lab Results   Component Value Date    TBILI 1.51 (H) 03/13/2024     With normal LFTs.  Suspected 2/2 liver congestion in the setting of CHF AE.  Monitor for now.    Type 2 diabetes mellitus with hyperglycemia, without long-term current use of insulin ([facility])  Assessment & Plan  Lab Results   Component Value Date    HGBA1C 6.3 (H) 05/06/2023       Recent Labs     03/14/24  1715 03/14/24  2113 03/15/24  0741 03/15/24  1153   POCGLU 108 137 104 161*       Blood Sugar Average: Last 72 hrs:  (P) [id number] medication: Metformin 1000 mg twice daily.    Plan:  ISS Algo 4 with Accu-Chek 4  times daily.  Diabetic diet.  Hypoglycemic protocol.          Medical Problems       Resolved Problems  Date Reviewed: 3/15/2024   None       Discharging Resident: Kiana Lagunas MD  Discharging Attending: Rosio Madrid MD  PCP: Haven Allred MD  Admission Date:   Admission Orders (From admission, onward)       Ordered        03/12/24 1000  INPATIENT ADMISSION  Once                          Discharge Date: 03/15/24    Consultations During Hospital Stay:  cardiology  Procedures Performed:   Chest x ray: Mild pulmonary vascular congestion and small right pleural effusion. Markedly enlarged cardiac silhouette similar to prior study may reflect underlying cardiomegaly or pericardial effusion.   U/s abdomen: ascites present  Significant Findings / Test Results:   See the procedures performed    Incidental Findings:   none    Test Results Pending at Discharge (will require follow up):  none     Outpatient Tests Requested:  Bmp in 1 week    Complications:  none    Reason for Admission: Shortness of breath, increased weight gain and dyspnea on exertion for few weeks.     Hospital Course:   Adrian Lewis is a 78 y.o. male patient who originally presented to the hospital on 3/12/2024 due to Shortness of breath, increased weight gain.  On exam patient is massively volume overloaded with elevated JVD, bilateral lower extremities scrotal and sacral edema. Lung exam reveals decreased breath sounds at bases..  Neurology was consulted and he was maintained on Lasix 80 mg 3 times daily and has total 16 L of urine output since admission.  Significant improvement in volume/respiratory status over the past 48-72 hours with aggressive IV diuresis.  He required very high-dose IV diuretics during this admission  in order to achieve effective volume removal.  Creatinine level did bump to 1.4 yesterday, after withholding further IV diuretics; his creatinine level has normalized.  He is about 9 pounds below his prior dry  weight.  Though his volume status has improved, he still examines with mild volume overload (JVD, possible component of chronicity to this given severe TR) and trace to mild nonpitting bilateral lower extremity edema.  Hold further IV diuresis due to mild resolving FRANCESCO.  cardiology recommend increasing his oral diuretic regimen to Lasix 80 mg twice daily and adding spironolactone 25 mg daily.    At the time of discharge patient is doing well no shortness of breath, mild pedal edema bilaterally.  Advised salt 2g and water restriction 2L.   Patient should follow-up with outpatient cardiology within 1 week after discharge and PCP for repeat BMP.  During this hospitalization patient was found to have mild ascites on ultrasound abdomen and pleural effusion on chest x-ray patient does not wish to go through paracentesis at this time.  Patient to follow-up with outpatient PCP and gastroenterology.    Please see above list of diagnoses and related plan for additional information.     Condition at Discharge: stable    Discharge Day Visit / Exam:   Subjective: Patient is doing well .no shortness of breath..   Vitals: Blood Pressure: 149/75 (03/15/24 0717)  Pulse: 64 (03/15/24 0717)  Temperature: 97.6 °F (36.4 °C) (03/15/24 0717)  Temp Source: Oral (03/12/24 0740)  Respirations: 18 (03/14/24 2111)  Height: 6' (182.9 cm) (03/12/24 1130)  Weight - Scale: 106 kg (233 lb 9.6 oz) (03/15/24 0600)  SpO2: 95 % (03/15/24 0717)  Exam:   Physical Exam  Vitals and nursing note reviewed.   Constitutional:       General: He is not in acute distress.     Appearance: He is well-developed. He is obese. He is not ill-appearing.   HENT:      Head: Normocephalic and atraumatic.      Nose: Nose normal.      Mouth/Throat:      Mouth: Mucous membranes are moist.      Pharynx: No oropharyngeal exudate or posterior oropharyngeal erythema.   Eyes:      General:         Right eye: No discharge.         Left eye: No discharge.      Extraocular Movements:  Extraocular movements intact.   Neck:      Comments: Significant JVD  Cardiovascular:      Rate and Rhythm: Normal rate. Rhythm irregular.      Pulses: Normal pulses.      Heart sounds: Murmur heard.   Pulmonary:      Effort: Pulmonary effort is normal. No respiratory distress.      Breath sounds: Rales present. No wheezing or rhonchi.      Comments: Bibasilar diminished breath sounds. Scattered rales.  Chest:      Chest wall: No tenderness.   Abdominal:      General: There is distension (significant).      Tenderness: There is no abdominal tenderness. There is no guarding or rebound.      Comments: Fluid wave+  Abdominal wall with 1-2+ pitting edema   Musculoskeletal:         General: No tenderness.      Cervical back: Normal range of motion and neck supple. No tenderness.      Right lower leg: Edema present.      Left lower leg: Edema present.      Comments: Bilateral lower extremity 3+ edema up to mid thigh level.   Lymphadenopathy:      Cervical: No cervical adenopathy.   Skin:     General: Skin is warm and dry.      Capillary Refill: Capillary refill takes less than 2 seconds.   Neurological:      Mental Status: He is alert and oriented to person, place, and time.   Psychiatric:         Mood and Affect: Mood normal.         Behavior: Behavior normal.         Thought Content: Thought content normal.         Judgment: Judgment normal.          Discussion with Family: Updated  (friend) via phone.    Discharge instructions/Information to patient and family:   See after visit summary for information provided to patient and family.      Provisions for Follow-Up Care:  See after visit summary for information related to follow-up care and any pertinent home health orders.      Mobility at time of Discharge:   Basic Mobility Inpatient Raw Score: 24  JH-HLM Goal: 8: Walk 250 feet or more  JH-HLM Achieved: 7: Walk 25 feet or more  HLM Goal achieved. Continue to encourage appropriate mobility.      Disposition:   Home    Planned Readmission: none     Discharge Medications:  See after visit summary for reconciled discharge medications provided to patient and/or family.      **Please Note: This note may have been constructed using a voice recognition system**

## 2024-03-15 NOTE — ASSESSMENT & PLAN NOTE
Home medications: Coreg 6.25 mg twice daily, losartan 100 mg daily.  Continue Coreg 6.5 mg twice daily.  Hold losartan in the setting of FRANCESCO and allow higher dose of diuresis.  Blood Pressure: 149/75

## 2024-03-15 NOTE — ASSESSMENT & PLAN NOTE
Lab Results   Component Value Date    CREATININE 1.04 03/15/2024    CREATININE 1.43 (H) 03/13/2024    CREATININE 1.26 03/13/2024     POA 1.41.  Baseline around 1.  Likely second to fluid overload, cardiorenal.  Reported good urine output.  Received 40 mg Lasix IV in the ED.    Plan:  UA w/ microscopic to eval cast, Daily weights, I/O  Diuresis per CHF.  Monitor BMP BID while on high dose diuresis.  Avoid hypoperfusion of the kidneys, minimize nephrotoxins- if near euvolemic.  RAAS Blockers/Diuretics held: Losartan

## 2024-03-15 NOTE — PROGRESS NOTES
General Cardiology   Progress Note -  [facility]   Adrian MEGHA Jean-Baptisteangela 78 y.o. male MRN: [id number]    Unit/Bed#: S -01 Encounter: [id number]    Assessment  1. 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   2. Moderate to severe mitral valve regurgitation (posterior leaflet tethering, moderate to severe posteriorly directed MR).   3. RV dilatation/RV dysfunction, severe PH/RVOT systolic notching.  -Volume overloaded on exam/imaging upon admission.  Gradually improving.  -BNP level 583.  -Chest x-ray PA/lateral-pulmonary vascular congestion.  -TTE 3/12/2024; LVEF 45%, mild global hypokinesis with regional variation, diastolic function is abnormal, RV moderately dilated/systolic function moderately reduced, LA severely dilated, RA severely dilated, mild AI, moderate to severe MR, severe TR; estimated RV systolic pressure 68 mmHg.  -Outpatient diuretic regimen; Lasix 40 mg twice daily  -Inpatient diuretic regimen; s/p short course of aggressive IV diuresis, was held on 3/15 due to bump in creatinine.  -24-hour JERICHO balance; -2.2 L overall -16.3 L  -Baseline dry weight around 242 pounds.  Pt reports recent weight of 330 pounds at home ?,  Weight 277 pounds on admission, currently 233 pounds.  4. Atrial fibrillation, unclear classification, newly diagnosed 5/2023; possibly chronic at this juncture.  -ECG on admission demonstrated rate controlled atrial fibrillation.  -Telemetry reviewed-rate controlled atrial fibrillation  -On carvedilol 6.25 mg twice daily  -On Xarelto 20 mg daily for systemic AC.  5. Hypertension  -Average /74 last recorded 131/85, HR 64  -Outpatient BP regimen; carvedilol 6.25 mg twice daily  -Inpatient BP regimen; carvedilol 6.25 mg twice daily   6. Dyslipidemia  -Lipid profile 5/6/2023; cholesterol 104, triglyceride 64, HDL 44 and LDL calculated 47.  -Previously on rosuvastatin 10 mg daily PTA, nonformulary, was switched to pravastatin 80 mg daily  this admission.  7. DM type II  -HgbA1c level 6.3.     Plan  -Significant improvement in volume/respiratory status over the past 48-72 hours with aggressive IV diuresis.  He required very high-dose IV diuretics during this admission  in order to achieve effective volume removal.  Creatinine level did bump to 1.4 yesterday, after withholding further IV diuretics; his creatinine level has normalized.  He is about 9 pounds below his prior dry weight.  Though his volume status has improved, he still examines with mild volume overload (JVD, possible component of chronicity to this given severe TR) and trace to mild nonpitting bilateral lower extremity edema.  Hold further IV diuresis due to mild resolving FRANCESCO.  Will recommend increasing his oral diuretic regimen to Lasix 80 mg twice daily and adding spironolactone 25 mg daily.  We will assess his response to this regimen today if stable on this current regimen we will plan to discharge him later today with close outpatient HF follow-up.  -Continue carvedilol 6.25 mg twice daily  -Continue losartan 100 mg daily.   -Price checked Jardiance, unfortunately cost prohibitive.  -Continue Xarelto 20 mg daily.  -Strict I's and O's, daily standing weights, 2 g Na+ diet 1.8 LFR.  -Monitor renal function and electrolytes closely.  Replete to maintain K+ level 4.0 magnesium level 2.0.  -No indication for telemetry.      Subjective  Review of Systems   Constitutional: Negative for chills, fever and malaise/fatigue.   Eyes:  Negative for visual disturbance.   Cardiovascular:  Positive for leg swelling. Negative for chest pain, dyspnea on exertion, orthopnea and palpitations.   Respiratory:  Negative for cough and shortness of breath.    Gastrointestinal:  Negative for abdominal pain.   Neurological:  Negative for dizziness, headaches and light-headedness.       Objective:   Physical Exam  Vitals and nursing note reviewed.   Constitutional:       General: He is not in acute distress.      Appearance: He is not diaphoretic.   Eyes:      General: No scleral icterus.  Neck:      Comments: JVD  Cardiovascular:      Rate and Rhythm: Normal rate.      Pulses: Normal pulses.      Heart sounds: Normal heart sounds. No murmur heard.  Pulmonary:      Effort: Pulmonary effort is normal.      Breath sounds: Normal breath sounds. No wheezing or rales.   Abdominal:      Palpations: Abdomen is soft.      Tenderness: There is no abdominal tenderness.   Musculoskeletal:      Right lower leg: Edema present.      Left lower leg: Edema present.      Comments: Mild nonpitting bilateral lower extremity edema.   Skin:     General: Skin is warm and dry.      Capillary Refill: Capillary refill takes less than 2 seconds.   Neurological:      General: No focal deficit present.      Mental Status: He is alert and oriented to person, place, and time.   Psychiatric:         Mood and Affect: Mood normal.         Vitals: Blood pressure 149/75, pulse 64, temperature 97.6 °F (36.4 °C), resp. rate 18, height 6' (1.829 m), weight 106 kg (233 lb 9.6 oz), SpO2 95%.,     Body mass index is 31.68 kg/m².,   Systolic (24hrs), Av , Min:116 , Max:149     Diastolic (24hrs), Av, Min:61, Max:82      Intake/Output Summary (Last 24 hours) at 3/15/2024 1115  Last data filed at 3/15/2024 0927  Gross per 24 hour   Intake 750 ml   Output 2250 ml   Net -1500 ml     Weight (last 2 days)       Date/Time Weight    03/15/24 0600 106 (233.6)    24 0600 109 (239.42)    24 0559 117 (257.6)            LABORATORY RESULTS      CBC with diff:   Results from last 7 days   Lab Units 03/15/24  0704 24  0557 24  0752   WBC Thousand/uL 6.30 7.25 8.21   HEMOGLOBIN g/dL 13.7 13.9 15.1   HEMATOCRIT % 43.3 44.4 49.3   MCV fL 101* 103* 106*   PLATELETS Thousands/uL 162 164 187   RBC Million/uL 4.28 4.30 4.64   MCH pg 32.0 32.3 32.5   MCHC g/dL 31.6 31.3* 30.6*   RDW % 16.6* 16.9* 16.9*   MPV fL 9.9 10.2 10.3   NRBC AUTO /100 WBCs  --   --   "0       CMP:  Results from last 7 days   Lab Units 03/15/24  0704 03/13/24  1950 03/13/24  1038 03/13/24  0557 03/12/24 1828 03/12/24  0752   POTASSIUM mmol/L 3.9 3.9 4.1  --  4.2 4.6   CHLORIDE mmol/L 98 96 100  --  102 104   CO2 mmol/L 35* 39* 34*  --  33* 32   BUN mg/dL 28* 36* 32*  --  35* 35*   CREATININE mg/dL 1.04 1.43* 1.26  --  1.42* 1.41*   CALCIUM mg/dL 8.9 9.2 9.0  --  9.1 9.3   AST U/L  --   --   --  30  --  32   ALT U/L  --   --   --  16  --  19   ALK PHOS U/L  --   --   --  123*  --  143*   EGFR ml/min/1.73sq m 68 46 54  --  47 47       BMP:  Results from last 7 days   Lab Units 03/15/24  0704 03/13/24  1950 03/13/24  1038 03/12/24 1828 03/12/24  0752   POTASSIUM mmol/L 3.9 3.9 4.1 4.2 4.6   CHLORIDE mmol/L 98 96 100 102 104   CO2 mmol/L 35* 39* 34* 33* 32   BUN mg/dL 28* 36* 32* 35* 35*   CREATININE mg/dL 1.04 1.43* 1.26 1.42* 1.41*   CALCIUM mg/dL 8.9 9.2 9.0 9.1 9.3       Lab Results   Component Value Date    NTBNP 416 05/15/2023        Results from last 7 days   Lab Units 03/15/24  0704 03/13/24  0557 03/12/24  0752   MAGNESIUM mg/dL 2.0 1.9 2.3                         Lipid Profile:   No results found for: \"CHOL\"  Lab Results   Component Value Date    HDL 44 05/06/2023     Lab Results   Component Value Date    LDLCALC 47 05/06/2023     Lab Results   Component Value Date    TRIG 64 05/06/2023       Cardiac testing:   No results found for this or any previous visit.    No results found for this or any previous visit.    No results found for this or any previous visit.    No valid procedures specified.  No results found for this or any previous visit.      Meds/Allergies   all current active meds have been reviewed and current meds:   Current Facility-Administered Medications   Medication Dose Route Frequency    acetaminophen (TYLENOL) tablet 650 mg  650 mg Oral Q4H PRN    aspirin (ECOTRIN LOW STRENGTH) EC tablet 81 mg  81 mg Oral Daily    bisacodyl (DULCOLAX) rectal suppository 10 mg  10 mg " Rectal Daily PRN    carvedilol (COREG) tablet 6.25 mg  6.25 mg Oral BID With Meals    diphenhydrAMINE (BENADRYL) tablet 12.5 mg  12.5 mg Oral HS PRN    furosemide (LASIX) tablet 40 mg  40 mg Oral BID (diuretic)    insulin lispro (HumALOG/ADMELOG) 100 units/mL subcutaneous injection 2-12 Units  2-12 Units Subcutaneous 4x Daily (AC & HS)    losartan (COZAAR) tablet 100 mg  100 mg Oral Daily    pravastatin (PRAVACHOL) tablet 80 mg  80 mg Oral Daily With Dinner    rivaroxaban (XARELTO) tablet 20 mg  20 mg Oral Daily With Breakfast    saliva substitute (MOUTH KOTE) mucosal solution 5 spray  5 spray Mouth/Throat 4x Daily PRN    senna (SENOKOT) tablet 17.2 mg  2 tablet Oral HS    spironolactone (ALDACTONE) tablet 25 mg  25 mg Oral Daily       Counseling / Coordination of Care  Total floor / unit time spent today 20 minutes.  Greater than 50% of total time was spent with the patient and / or family counseling and / or coordination of care.      ** Please Note: Dragon 360 Dictation voice to text software may have been used in the creation of this document. **

## 2024-03-15 NOTE — ASSESSMENT & PLAN NOTE
Lab Results   Component Value Date    HGBA1C 6.3 (H) 05/06/2023       Recent Labs     03/14/24  1715 03/14/24  2113 03/15/24  0741 03/15/24  1153   POCGLU 108 137 104 161*       Blood Sugar Average: Last 72 hrs:  (P) [id number] medication: Metformin 1000 mg twice daily.    Plan:  ISS Algo 4 with Accu-Chek 4 times daily.  Diabetic diet.  Hypoglycemic protocol.

## 2024-03-15 NOTE — DISCHARGE INSTR - AVS FIRST PAGE
Dear Adrian Lewis,     It was our pleasure to care for you here at [facility].  It is our hope that we were always able to exceed the expected standards for your care during your stay.  You were hospitalized due to CHF exacerbation. you were cared for on the 3 floor by Kiana Lagunas MD under the service of Rosio Madrid MD with the [facility] who covers for your primary care physician (PCP), Haven Allred MD, while you were hospitalized.  If you have any questions or concerns related to this hospitalization, you may contact us at .  For follow up as well as any medication refills, we recommend that you follow up with your primary care physician.  A registered nurse will reach out to you by phone within a few days after your discharge to answer any additional questions that you may have after going home.  However, at this time we provide for you here, the most important instructions / recommendations at discharge:     Notable Medication Adjustments -   Please start taking Senokot 8.6 mg tablet daily at bedtime for constipation  Please start taking spironolactone 25 mg tablet daily  Please start taking Lasix 80 mg twice a day  Testing Required after Discharge -   BMP (blood test) in 4-7 days to check potassium  ** Please contact your PCP to request testing orders for any of the testing recommended here **  Important follow up information -   Please follow-up with your PCP and cardiology within 1 week after discharge  Other Instructions -   Please return to hospital if you have chest pain, shortness of breath, dizziness, increase in weight.  Please review this entire after visit summary as additional general instructions including medication list, appointments, activity, diet, any pertinent wound care, and other additional recommendations from your care team that may be provided for you.      Sincerely,     Kiana Hope  MD Janneth

## 2024-08-02 NOTE — ASSESSMENT & PLAN NOTE
· Blood pressure was modestly elevated   · Home medications include Coreg 25 mg daily, HCTZ 25 mg daily, Cozaar 100 mg daily, Lasix 20 mg daily  · At discharge he will be on Lasix 40 mg p o  twice daily, Coreg 6 25 mg twice daily and Cozaar 100 mg daily [see HPI] : see HPI [Negative] : Heme/Lymph [Eyesight Problems] : eyesight problems

## 2025-05-12 ENCOUNTER — OFFICE VISIT (OUTPATIENT)
Dept: INTERNAL MEDICINE CLINIC | Facility: CLINIC | Age: 80
End: 2025-05-12
Payer: COMMERCIAL

## 2025-05-12 VITALS
OXYGEN SATURATION: 95 % | TEMPERATURE: 98.9 F | HEART RATE: 70 BPM | SYSTOLIC BLOOD PRESSURE: 130 MMHG | DIASTOLIC BLOOD PRESSURE: 80 MMHG | HEIGHT: 69 IN | BODY MASS INDEX: 36.43 KG/M2 | WEIGHT: 246 LBS

## 2025-05-12 DIAGNOSIS — I48.91 ATRIAL FIBRILLATION (HCC): ICD-10-CM

## 2025-05-12 DIAGNOSIS — I10 PRIMARY HYPERTENSION: ICD-10-CM

## 2025-05-12 DIAGNOSIS — I50.42 CHRONIC COMBINED SYSTOLIC AND DIASTOLIC CHF (CONGESTIVE HEART FAILURE) (HCC): Primary | ICD-10-CM

## 2025-05-12 DIAGNOSIS — E11.65 TYPE 2 DIABETES MELLITUS WITH HYPERGLYCEMIA, WITHOUT LONG-TERM CURRENT USE OF INSULIN (HCC): ICD-10-CM

## 2025-05-12 PROBLEM — N17.9 AKI (ACUTE KIDNEY INJURY) (HCC): Status: RESOLVED | Noted: 2024-03-12 | Resolved: 2025-05-12

## 2025-05-12 PROBLEM — I50.20 HFREF (HEART FAILURE WITH REDUCED EJECTION FRACTION) (HCC): Status: ACTIVE | Noted: 2025-05-12

## 2025-05-12 PROBLEM — I50.22 CHRONIC HEART FAILURE WITH MILDLY REDUCED EJECTION FRACTION (HFMREF, 41-49%) (HCC): Status: ACTIVE | Noted: 2025-05-12

## 2025-05-12 PROBLEM — R18.8 ASCITES: Status: RESOLVED | Noted: 2024-03-14 | Resolved: 2025-05-12

## 2025-05-12 PROBLEM — I50.43 ACUTE ON CHRONIC COMBINED SYSTOLIC AND DIASTOLIC HEART FAILURE (HCC): Status: RESOLVED | Noted: 2023-05-15 | Resolved: 2025-05-12

## 2025-05-12 PROBLEM — J90 PLEURAL EFFUSION: Status: RESOLVED | Noted: 2024-03-14 | Resolved: 2025-05-12

## 2025-05-12 PROCEDURE — 99204 OFFICE O/P NEW MOD 45 MIN: CPT | Performed by: INTERNAL MEDICINE

## 2025-05-12 RX ORDER — SPIRONOLACTONE 25 MG/1
25 TABLET ORAL DAILY
Qty: 90 TABLET | Refills: 1 | Status: SHIPPED | OUTPATIENT
Start: 2025-05-12

## 2025-05-12 RX ORDER — ROSUVASTATIN CALCIUM 10 MG/1
10 TABLET, COATED ORAL DAILY
Qty: 90 TABLET | Refills: 1 | Status: SHIPPED | OUTPATIENT
Start: 2025-05-12

## 2025-05-12 RX ORDER — FUROSEMIDE 40 MG/1
40 TABLET ORAL DAILY
Qty: 30 TABLET | Refills: 2 | Status: SHIPPED | OUTPATIENT
Start: 2025-05-12

## 2025-05-12 RX ORDER — CARVEDILOL 6.25 MG/1
6.25 TABLET ORAL 2 TIMES DAILY WITH MEALS
Qty: 180 TABLET | Refills: 1 | Status: SHIPPED | OUTPATIENT
Start: 2025-05-12

## 2025-05-12 RX ORDER — CLOPIDOGREL BISULFATE 75 MG/1
75 TABLET ORAL DAILY
Qty: 90 TABLET | Refills: 1 | Status: SHIPPED | OUTPATIENT
Start: 2025-05-12

## 2025-05-12 RX ORDER — LOSARTAN POTASSIUM 100 MG/1
100 TABLET ORAL DAILY
Qty: 90 TABLET | Refills: 1 | Status: SHIPPED | OUTPATIENT
Start: 2025-05-12

## 2025-05-12 RX ORDER — HYDROCHLOROTHIAZIDE 25 MG/1
1 TABLET ORAL DAILY
COMMUNITY
Start: 2025-05-06 | End: 2025-05-12

## 2025-05-12 RX ORDER — CLOPIDOGREL BISULFATE 75 MG/1
1 TABLET ORAL DAILY
COMMUNITY
Start: 2025-05-06 | End: 2025-05-12 | Stop reason: SDUPTHER

## 2025-05-12 NOTE — ASSESSMENT & PLAN NOTE
BP acceptable.   Orders:    CBC and differential; Future    Comprehensive metabolic panel; Future    Lipid Panel with Direct LDL reflex; Future    Hemoglobin A1C; Future    TSH, 3rd generation with Free T4 reflex; Future    Urinalysis with microscopic; Future    spironolactone (ALDACTONE) 25 mg tablet; Take 1 tablet (25 mg total) by mouth daily    Vitamin B12; Future    losartan (COZAAR) 100 MG tablet; Take 1 tablet (100 mg total) by mouth daily

## 2025-05-12 NOTE — PROGRESS NOTES
Name: Adrian Lewis      : 1945      MRN: [id number]  Encounter Provider: Nusrat Kumar MD  Encounter Date: 2025   Encounter department: [facility] INTERNAL MEDICINE [place]  :  Assessment & Plan  Chronic combined systolic and diastolic CHF (congestive heart failure) (HCC)  Wt Readings from Last 3 Encounters:   25 112 kg (246 lb)   03/15/24 106 kg (233 lb 9.6 oz)   23 110 kg (242 lb 8 oz)   Patient has gained weight in the last 6 months, about 20lb. He is otherwise asymptomatic.  He leaves alone, states he is compliant with low salt diet.  Continue lasix, stop HCTZ and potassium supplementation and add spironolactone 25mg.  Blood work ordered to determine further adjustment on diuretic as well as clinical assessment in 3 weeks.              Orders:    CBC and differential; Future    Comprehensive metabolic panel; Future    Lipid Panel with Direct LDL reflex; Future    Hemoglobin A1C; Future    TSH, 3rd generation with Free T4 reflex; Future    Urinalysis with microscopic; Future    spironolactone (ALDACTONE) 25 mg tablet; Take 1 tablet (25 mg total) by mouth daily    Vitamin B12; Future    B-Type Natriuretic Peptide(BNP); Future    carvedilol (COREG) 6.25 mg tablet; Take 1 tablet (6.25 mg total) by mouth 2 (two) times a day with meals    furosemide (LASIX) 40 mg tablet; Take 1 tablet (40 mg total) by mouth daily    clopidogrel (PLAVIX) 75 mg tablet; Take 1 tablet (75 mg total) by mouth daily    Primary hypertension  BP acceptable.   Orders:    CBC and differential; Future    Comprehensive metabolic panel; Future    Lipid Panel with Direct LDL reflex; Future    Hemoglobin A1C; Future    TSH, 3rd generation with Free T4 reflex; Future    Urinalysis with microscopic; Future    spironolactone (ALDACTONE) 25 mg tablet; Take 1 tablet (25 mg total) by mouth daily    Vitamin B12; Future    losartan (COZAAR) 100 MG tablet; Take 1 tablet (100 mg total) by mouth daily    Type 2 diabetes  mellitus with hyperglycemia, without long-term current use of insulin (HCC)    Lab Results   Component Value Date    HGBA1C 6.3 (H) 05/06/2023   Repeat A1c  On ARB, statin.      Orders:    CBC and differential; Future    Comprehensive metabolic panel; Future    Lipid Panel with Direct LDL reflex; Future    Hemoglobin A1C; Future    TSH, 3rd generation with Free T4 reflex; Future    Urinalysis with microscopic; Future    spironolactone (ALDACTONE) 25 mg tablet; Take 1 tablet (25 mg total) by mouth daily    Vitamin B12; Future    rosuvastatin (CRESTOR) 10 MG tablet; Take 1 tablet (10 mg total) by mouth daily    metFORMIN (GLUCOPHAGE) 1000 MG tablet; Take 1 tablet (1,000 mg total) by mouth 2 (two) times a day with meals Pt reports that he should be taking 1,000mg BID but takes total 2,000mg once a day    Albumin / creatinine urine ratio; Future    Atrial fibrillation (HCC)  Anticoagulated, rate controlled.  Orders:    rivaroxaban (Xarelto) 20 mg tablet; Take 1 tablet (20 mg total) by mouth daily with breakfast           History of Present Illness   Patient is a 79-year-old male with past medical history of chronic atrial fibrillation, heart failure with mildly reduced ejection fraction, hypertension, diabetes and obesity who presents in the office to establish care with us as a primary care physician.  Patient is [place] speaking and this encounter was done in [place] by me.  He is presenting in the office with his friend Raúl.  Patient lives by himself and still works as a .  Patient had a hospitalization last year for acute exacerbation of CHF, the cause of his CHF was assumed to be secondary to coronary artery disease.  At that time his echocardiogram showed a EF of 45%. His diuretics were optimized he was discharged on Lasix 80 mg twice daily and he was admitted Aldactone.  SGLT 2 was not initiated due to cost restraints.  Unfortunately patient stopped taking Aldactone after his prescription ran  "out.  He has been following with his primary care physician, those records are not available.  He has not been follow-up with cardiologist.  His current regiment for heart failure include carvedilol, Lasix 40 mg daily, he is also on hydrochlorothiazide, potassium supplementation, losartan, metformin, Xarelto, aspirin and Plavix.  He states that he watches his sugars and salt in his diet.  He did notice increase in weight in the past 6 months about 20 pounds.  He states his last blood work was about 6 to 7 months ago when he saw his primary care physician.  We do not have records of this.  He states that he is otherwise doing very well, he has no Cardiologic symptoms, he continues to work daily and able to do all his activities of daily living independently.        Review of Systems   Constitutional:  Positive for unexpected weight change. Negative for appetite change and fatigue.   Eyes:  Negative for visual disturbance.   Respiratory:  Negative for cough, chest tightness, shortness of breath and wheezing.    Cardiovascular:  Negative for chest pain, palpitations and leg swelling.   Gastrointestinal:  Positive for constipation. Negative for abdominal pain, nausea and vomiting.   Genitourinary:  Negative for difficulty urinating and frequency.   Musculoskeletal:  Negative for arthralgias and joint swelling.   Skin:  Negative for rash.   Neurological:  Negative for dizziness and headaches.   Psychiatric/Behavioral:  Negative for confusion and sleep disturbance. The patient is not nervous/anxious.        Objective   /80   Pulse 70   Temp 98.9 °F (37.2 °C) (Tympanic)   Ht 5' 9.25\" (1.759 m)   Wt 112 kg (246 lb)   SpO2 95%   BMI 36.07 kg/m²      Physical Exam  Vitals and nursing note reviewed.   Constitutional:       General: He is not in acute distress.     Appearance: Normal appearance. He is obese.   HENT:      Head: Normocephalic and atraumatic.      Nose: Nose normal.   Eyes:      Extraocular Movements: " Extraocular movements intact.      Pupils: Pupils are equal, round, and reactive to light.   Cardiovascular:      Rate and Rhythm: Rhythm irregular.      Pulses: Normal pulses.      Heart sounds: No murmur heard.  Pulmonary:      Effort: Pulmonary effort is normal.      Breath sounds: Rales (b/l bases, right more than left) present. No wheezing or rhonchi.   Abdominal:      General: Abdomen is flat. Bowel sounds are normal.      Palpations: Abdomen is soft.      Tenderness: There is no abdominal tenderness.   Musculoskeletal:         General: No swelling. Normal range of motion.      Cervical back: Normal range of motion and neck supple.      Right lower leg: Edema (+1) present.      Left lower leg: Edema (+1) present.   Skin:     General: Skin is warm and dry.      Capillary Refill: Capillary refill takes less than 2 seconds.   Neurological:      General: No focal deficit present.      Mental Status: He is alert and oriented to person, place, and time.      Sensory: No sensory deficit.      Motor: No weakness.   Psychiatric:         Mood and Affect: Mood normal.         Behavior: Behavior normal.         Thought Content: Thought content normal.         Judgment: Judgment normal.     Administrative Statements   I have spent a total time of 50 minutes in caring for this patient on the day of the visit/encounter including Prognosis, Instructions for management, Patient and family education, Importance of tx compliance, Impressions, Documenting in the medical record, Reviewing/placing orders in the medical record (including tests, medications, and/or procedures), and Obtaining or reviewing history  .

## 2025-05-12 NOTE — ASSESSMENT & PLAN NOTE
Lab Results   Component Value Date    HGBA1C 6.3 (H) 05/06/2023   Repeat A1c  On ARB, statin.      Orders:    CBC and differential; Future    Comprehensive metabolic panel; Future    Lipid Panel with Direct LDL reflex; Future    Hemoglobin A1C; Future    TSH, 3rd generation with Free T4 reflex; Future    Urinalysis with microscopic; Future    spironolactone (ALDACTONE) 25 mg tablet; Take 1 tablet (25 mg total) by mouth daily    Vitamin B12; Future    rosuvastatin (CRESTOR) 10 MG tablet; Take 1 tablet (10 mg total) by mouth daily    metFORMIN (GLUCOPHAGE) 1000 MG tablet; Take 1 tablet (1,000 mg total) by mouth 2 (two) times a day with meals Pt reports that he should be taking 1,000mg BID but takes total 2,000mg once a day    Albumin / creatinine urine ratio; Future

## 2025-05-12 NOTE — ASSESSMENT & PLAN NOTE
Wt Readings from Last 3 Encounters:   05/12/25 112 kg (246 lb)   03/15/24 106 kg (233 lb 9.6 oz)   06/19/23 110 kg (242 lb 8 oz)   Patient has gained weight in the last 6 months, about 20lb. He is otherwise asymptomatic.  He leaves alone, states he is compliant with low salt diet.  Continue lasix, stop HCTZ and potassium supplementation and add spironolactone 25mg.  Blood work ordered to determine further adjustment on diuretic as well as clinical assessment in 3 weeks.              Orders:    CBC and differential; Future    Comprehensive metabolic panel; Future    Lipid Panel with Direct LDL reflex; Future    Hemoglobin A1C; Future    TSH, 3rd generation with Free T4 reflex; Future    Urinalysis with microscopic; Future    spironolactone (ALDACTONE) 25 mg tablet; Take 1 tablet (25 mg total) by mouth daily    Vitamin B12; Future    B-Type Natriuretic Peptide(BNP); Future    carvedilol (COREG) 6.25 mg tablet; Take 1 tablet (6.25 mg total) by mouth 2 (two) times a day with meals    furosemide (LASIX) 40 mg tablet; Take 1 tablet (40 mg total) by mouth daily    clopidogrel (PLAVIX) 75 mg tablet; Take 1 tablet (75 mg total) by mouth daily

## 2025-05-12 NOTE — ASSESSMENT & PLAN NOTE
Anticoagulated, rate controlled.  Orders:    rivaroxaban (Xarelto) 20 mg tablet; Take 1 tablet (20 mg total) by mouth daily with breakfast

## 2025-05-13 ENCOUNTER — TELEPHONE (OUTPATIENT)
Age: 80
End: 2025-05-13

## 2025-05-13 NOTE — TELEPHONE ENCOUNTER
I called the pharmacy and left message concerning the medication and Dr. Kumar's approval for patient to take.

## 2025-05-13 NOTE — TELEPHONE ENCOUNTER
[facility] pharmacy called in regards to spironolactone (ALDACTONE) 25 mg tablet  for potential drug interaction with losartan to increase potassium ..please update prescription to say approved if PCP approves it

## 2025-05-22 LAB
ALBUMIN SERPL-MCNC: 4.6 G/DL (ref 3.8–4.8)
ALP SERPL-CCNC: 133 IU/L (ref 44–121)
ALT SERPL-CCNC: 16 IU/L (ref 0–44)
APPEARANCE UR: CLEAR
AST SERPL-CCNC: 22 IU/L (ref 0–40)
BACTERIA URNS QL MICRO: NORMAL
BASOPHILS # BLD AUTO: 0 X10E3/UL (ref 0–0.2)
BASOPHILS NFR BLD AUTO: 0 %
BILIRUB SERPL-MCNC: 0.6 MG/DL (ref 0–1.2)
BILIRUB UR QL STRIP: NEGATIVE
BUN SERPL-MCNC: 40 MG/DL (ref 8–27)
BUN/CREAT SERPL: 34 (ref 10–24)
CALCIUM SERPL-MCNC: 9.7 MG/DL (ref 8.6–10.2)
CASTS URNS QL MICRO: NORMAL /LPF
CHLORIDE SERPL-SCNC: 102 MMOL/L (ref 96–106)
CHOLEST SERPL-MCNC: 157 MG/DL (ref 100–199)
CO2 SERPL-SCNC: 20 MMOL/L (ref 20–29)
COLOR UR: YELLOW
CREAT SERPL-MCNC: 1.19 MG/DL (ref 0.76–1.27)
EGFR: 63 ML/MIN/1.73
EOSINOPHIL # BLD AUTO: 0.2 X10E3/UL (ref 0–0.4)
EOSINOPHIL NFR BLD AUTO: 2 %
EPI CELLS #/AREA URNS HPF: NORMAL /HPF (ref 0–10)
ERYTHROCYTE [DISTWIDTH] IN BLOOD BY AUTOMATED COUNT: 13.2 % (ref 11.6–15.4)
GLOBULIN SER-MCNC: 2.6 G/DL (ref 1.5–4.5)
GLUCOSE SERPL-MCNC: 152 MG/DL (ref 70–99)
GLUCOSE UR QL: NEGATIVE
HBA1C MFR BLD: 7.1 % (ref 4.8–5.6)
HCT VFR BLD AUTO: 43.4 % (ref 37.5–51)
HDLC SERPL-MCNC: 67 MG/DL
HGB BLD-MCNC: 13.8 G/DL (ref 13–17.7)
HGB UR QL STRIP: NEGATIVE
IMM GRANULOCYTES # BLD: 0 X10E3/UL (ref 0–0.1)
IMM GRANULOCYTES NFR BLD: 0 %
KETONES UR QL STRIP: NEGATIVE
LDLC SERPL CALC-MCNC: 69 MG/DL (ref 0–99)
LEUKOCYTE ESTERASE UR QL STRIP: NEGATIVE
LYMPHOCYTES # BLD AUTO: 1.6 X10E3/UL (ref 0.7–3.1)
LYMPHOCYTES NFR BLD AUTO: 23 %
MCH RBC QN AUTO: 30.8 PG (ref 26.6–33)
MCHC RBC AUTO-ENTMCNC: 31.8 G/DL (ref 31.5–35.7)
MCV RBC AUTO: 97 FL (ref 79–97)
MICRO URNS: NORMAL
MICRO URNS: NORMAL
MONOCYTES # BLD AUTO: 0.5 X10E3/UL (ref 0.1–0.9)
MONOCYTES NFR BLD AUTO: 6 %
NEUTROPHILS # BLD AUTO: 4.7 X10E3/UL (ref 1.4–7)
NEUTROPHILS NFR BLD AUTO: 69 %
NITRITE UR QL STRIP: NEGATIVE
NT-PROBNP SERPL-MCNC: 358 PG/ML (ref 0–486)
PH UR STRIP: 5.5 [PH] (ref 5–7.5)
PLATELET # BLD AUTO: 214 X10E3/UL (ref 150–450)
POTASSIUM SERPL-SCNC: 5.3 MMOL/L (ref 3.5–5.2)
PROT SERPL-MCNC: 7.2 G/DL (ref 6–8.5)
PROT UR QL STRIP: NEGATIVE
RBC # BLD AUTO: 4.48 X10E6/UL (ref 4.14–5.8)
RBC #/AREA URNS HPF: NORMAL /HPF (ref 0–2)
SL AMB VLDL CHOLESTEROL CALC: 21 MG/DL (ref 5–40)
SODIUM SERPL-SCNC: 141 MMOL/L (ref 134–144)
SP GR UR: 1.02 (ref 1–1.03)
TRIGL SERPL-MCNC: 122 MG/DL (ref 0–149)
TSH SERPL DL<=0.005 MIU/L-ACNC: 4.22 UIU/ML (ref 0.45–4.5)
UROBILINOGEN UR STRIP-ACNC: 0.2 MG/DL (ref 0.2–1)
VIT B12 SERPL-MCNC: 328 PG/ML (ref 232–1245)
WBC # BLD AUTO: 7 X10E3/UL (ref 3.4–10.8)
WBC #/AREA URNS HPF: NORMAL /HPF (ref 0–5)

## 2025-05-23 LAB
ALBUMIN/CREAT UR: 24 MG/G CREAT (ref 0–29)
CREAT UR-MCNC: 96.6 MG/DL
MICROALBUMIN UR-MCNC: 23.5 UG/ML

## 2025-06-04 ENCOUNTER — OFFICE VISIT (OUTPATIENT)
Dept: INTERNAL MEDICINE CLINIC | Facility: CLINIC | Age: 80
End: 2025-06-04
Payer: COMMERCIAL

## 2025-06-04 VITALS
RESPIRATION RATE: 16 BRPM | WEIGHT: 242 LBS | DIASTOLIC BLOOD PRESSURE: 82 MMHG | BODY MASS INDEX: 35.84 KG/M2 | SYSTOLIC BLOOD PRESSURE: 140 MMHG | HEART RATE: 70 BPM | HEIGHT: 69 IN | OXYGEN SATURATION: 96 % | TEMPERATURE: 98.1 F

## 2025-06-04 DIAGNOSIS — I48.91 ATRIAL FIBRILLATION (HCC): ICD-10-CM

## 2025-06-04 DIAGNOSIS — Z11.59 NEED FOR HEPATITIS C SCREENING TEST: ICD-10-CM

## 2025-06-04 DIAGNOSIS — Z12.5 SCREENING FOR PROSTATE CANCER: ICD-10-CM

## 2025-06-04 DIAGNOSIS — K59.09 OTHER CONSTIPATION: ICD-10-CM

## 2025-06-04 DIAGNOSIS — I10 PRIMARY HYPERTENSION: ICD-10-CM

## 2025-06-04 DIAGNOSIS — E11.65 TYPE 2 DIABETES MELLITUS WITH HYPERGLYCEMIA, WITHOUT LONG-TERM CURRENT USE OF INSULIN (HCC): Primary | ICD-10-CM

## 2025-06-04 DIAGNOSIS — I50.42 CHRONIC COMBINED SYSTOLIC AND DIASTOLIC CHF (CONGESTIVE HEART FAILURE) (HCC): ICD-10-CM

## 2025-06-04 DIAGNOSIS — Z00.00 ANNUAL PHYSICAL EXAM: ICD-10-CM

## 2025-06-04 PROCEDURE — 99397 PER PM REEVAL EST PAT 65+ YR: CPT | Performed by: INTERNAL MEDICINE

## 2025-06-04 PROCEDURE — 99214 OFFICE O/P EST MOD 30 MIN: CPT | Performed by: INTERNAL MEDICINE

## 2025-06-04 RX ORDER — ASPIRIN 81 MG/1
81 TABLET ORAL DAILY
Qty: 90 TABLET | Refills: 1 | Status: SHIPPED | OUTPATIENT
Start: 2025-06-04

## 2025-06-04 RX ORDER — SENNOSIDES 8.6 MG
17.2 TABLET ORAL
Qty: 180 TABLET | Refills: 1 | Status: SHIPPED | OUTPATIENT
Start: 2025-06-04

## 2025-06-04 RX ORDER — ROSUVASTATIN CALCIUM 10 MG/1
10 TABLET, COATED ORAL DAILY
Qty: 90 TABLET | Refills: 1 | Status: SHIPPED | OUTPATIENT
Start: 2025-06-04

## 2025-06-04 RX ORDER — MAGNESIUM 200 MG
1000 TABLET ORAL DAILY
Qty: 90 TABLET | Refills: 1 | Status: SHIPPED | OUTPATIENT
Start: 2025-06-04

## 2025-06-04 RX ORDER — CARVEDILOL 6.25 MG/1
6.25 TABLET ORAL 2 TIMES DAILY WITH MEALS
Qty: 180 TABLET | Refills: 1 | Status: SHIPPED | OUTPATIENT
Start: 2025-06-04

## 2025-06-04 RX ORDER — CLOPIDOGREL BISULFATE 75 MG/1
75 TABLET ORAL DAILY
Qty: 90 TABLET | Refills: 1 | Status: SHIPPED | OUTPATIENT
Start: 2025-06-04

## 2025-06-04 RX ORDER — LOSARTAN POTASSIUM 100 MG/1
100 TABLET ORAL DAILY
Qty: 90 TABLET | Refills: 1 | Status: SHIPPED | OUTPATIENT
Start: 2025-06-04

## 2025-06-04 RX ORDER — FUROSEMIDE 40 MG/1
40 TABLET ORAL DAILY
Qty: 90 TABLET | Refills: 1 | Status: SHIPPED | OUTPATIENT
Start: 2025-06-04

## 2025-06-04 NOTE — ASSESSMENT & PLAN NOTE
BP slight elevated was not takin losartan because it said potassium in the bottle, oriented to resume it.  Orders:    losartan (COZAAR) 100 MG tablet; Take 1 tablet (100 mg total) by mouth daily

## 2025-06-04 NOTE — PROGRESS NOTES
Adult Annual Physical  Name: Adrian Lewis      : 1945      MRN: [id number]  Encounter Provider: Nusrat Kumar MD  Encounter Date: 2025   Encounter department: [facility] INTERNAL MEDICINE [place]    :  Assessment & Plan  Type 2 diabetes mellitus with hyperglycemia, without long-term current use of insulin (HCC)    Lab Results   Component Value Date    HGBA1C 7.1 (H) 2025   Continue metformin 1000 mg BID, on statin and ARB  A1c in 3-6 months    Orders:    Ambulatory Referral to Ophthalmology; Future    Comprehensive metabolic panel; Future    Cyanocobalamin (Vitamin B-12) 1000 MCG SUBL; Place 1 tablet (1,000 mcg total) under the tongue in the morning    metFORMIN (GLUCOPHAGE) 1000 MG tablet; Take 1 tablet (1,000 mg total) by mouth 2 (two) times a day with meals Pt reports that he should be taking 1,000mg BID but takes total 2,000mg once a day    rosuvastatin (CRESTOR) 10 MG tablet; Take 1 tablet (10 mg total) by mouth daily    Need for hepatitis C screening test    Orders:    Hepatitis C Antibody; Future    Screening for prostate cancer  States had a rectal exam done in the past, no PSA on file.   Orders:    PSA, Total Screen; Future    Chronic combined systolic and diastolic CHF (congestive heart failure) (HCC)  Wt Readings from Last 3 Encounters:   25 110 kg (242 lb)   25 112 kg (246 lb)   03/15/24 106 kg (233 lb 9.6 oz)       Lost 4 lb since addition to aldactone.  K was slight elevated, will recheck        Orders:    carvedilol (COREG) 6.25 mg tablet; Take 1 tablet (6.25 mg total) by mouth 2 (two) times a day with meals    clopidogrel (PLAVIX) 75 mg tablet; Take 1 tablet (75 mg total) by mouth daily    furosemide (LASIX) 40 mg tablet; Take 1 tablet (40 mg total) by mouth daily    Primary hypertension  BP slight elevated was not takin losartan because it said potassium in the bottle, oriented to resume it.  Orders:    losartan (COZAAR) 100 MG tablet; Take 1 tablet (100 mg  "total) by mouth daily    Atrial fibrillation (HCC)    Orders:    aspirin 81 mg EC tablet; Take 1 tablet (81 mg total) by mouth in the morning.    rivaroxaban (Xarelto) 20 mg tablet; Take 1 tablet (20 mg total) by mouth daily with breakfast    Other constipation  Improve with senna, continue the same  Orders:    senna (SENOKOT) 8.6 mg; Take 2 tablets (17.2 mg total) by mouth daily at bedtime    Annual physical exam  Preventive care done in detail.           Preventive Screenings:  - Diabetes Screening: screening not indicated and has diabetes  - Cholesterol Screening: screening up-to-date   - Hepatitis C screening: orders placed   - Lung cancer screening: screening not indicated   - Prostate cancer screening: screening not indicated     Immunizations:  - Immunizations due: Prevnar 20, Tdap and Zoster (Shingrix)  - Risks/benefits immunizations discussed    - The patient declines recommended vaccines currently despite my recommendations      Counseling/Anticipatory Guidance:    - Diet: discussed recommendations for a healthy/well-balanced diet.   - Exercise: the importance of regular exercise/physical activity was discussed. Recommend exercise 3-5 times per week for at least 30 minutes.          History of Present Illness     Adult Annual Physical:  Patient presents for annual physical. Patient is doing well, no subjective complaints.  Reviewed blood work, slight low B-12, K elevated to 5.3,   BP slight elevated has not been taking losartan  .     Diet and Physical Activity:  - Diet/Nutrition: diabetic diet.  - Exercise: no formal exercise.    General Health:  - Sleep: sleeps well.  - Hearing: normal hearing bilateral ears.  - Vision: vision problems.     Health:    - Urinary symptoms: none.     Review of Systems      Objective   /82 (BP Location: Left arm, Patient Position: Sitting, Cuff Size: Large)   Pulse 70   Temp 98.1 °F (36.7 °C) (Tympanic)   Resp 16   Ht 5' 9\" (1.753 m)   Wt 110 kg (242 lb)   SpO2 " 96%   BMI 35.74 kg/m²     Physical Exam  Vitals and nursing note reviewed.   Constitutional:       General: He is not in acute distress.     Appearance: He is well-developed.   HENT:      Head: Normocephalic and atraumatic.     Eyes:      Conjunctiva/sclera: Conjunctivae normal.       Cardiovascular:      Rate and Rhythm: Normal rate. Rhythm irregular.      Pulses: no weak pulses.           Dorsalis pedis pulses are 1+ on the right side and 1+ on the left side.        Posterior tibial pulses are 1+ on the right side and 1+ on the left side.      Heart sounds: No murmur heard.  Pulmonary:      Effort: Pulmonary effort is normal. No respiratory distress.      Breath sounds: Normal breath sounds. No rales.     Musculoskeletal:         General: No swelling.      Cervical back: Neck supple.        Feet:    Feet:      Right foot:      Skin integrity: Callus present. No ulcer, skin breakdown, erythema, warmth or dry skin.      Left foot:      Skin integrity: Callus present. No ulcer, skin breakdown, erythema, warmth or dry skin.     Skin:     General: Skin is warm and dry.      Capillary Refill: Capillary refill takes less than 2 seconds.     Neurological:      Mental Status: He is alert and oriented to person, place, and time.     Psychiatric:         Mood and Affect: Mood normal.     Diabetic Foot Exam    Patient's shoes and socks removed.    Right Foot/Ankle   Right Foot Inspection  Skin Exam: skin normal, skin intact, callus and callus. No dry skin, no warmth, no erythema, no maceration, no abnormal color, no pre-ulcer and no ulcer.     Toe Exam: ROM and strength within normal limits.     Sensory   Monofilament testing: intact    Vascular  Capillary refills: < 3 seconds  The right DP pulse is 1+. The right PT pulse is 1+.     Left Foot/Ankle  Left Foot Inspection  Skin Exam: skin normal, skin intact and callus. No dry skin, no warmth, no erythema, no maceration, normal color, no pre-ulcer and no ulcer.     Toe Exam:  ROM and strength within normal limits.     Sensory   Monofilament testing: diminished    Vascular  Capillary refills: < 3 seconds  The left DP pulse is 1+. The left PT pulse is 1+.     Assign Risk Category  No deformity present  Loss of protective sensation  No weak pulses  Risk: 1

## 2025-06-04 NOTE — ASSESSMENT & PLAN NOTE
Improve with senna, continue the same  Orders:    senna (SENOKOT) 8.6 mg; Take 2 tablets (17.2 mg total) by mouth daily at bedtime

## 2025-06-04 NOTE — ASSESSMENT & PLAN NOTE
Orders:    aspirin 81 mg EC tablet; Take 1 tablet (81 mg total) by mouth in the morning.    rivaroxaban (Xarelto) 20 mg tablet; Take 1 tablet (20 mg total) by mouth daily with breakfast

## 2025-06-04 NOTE — ASSESSMENT & PLAN NOTE
Lab Results   Component Value Date    HGBA1C 7.1 (H) 05/21/2025   Continue metformin 1000 mg BID, on statin and ARB  A1c in 3-6 months    Orders:    Ambulatory Referral to Ophthalmology; Future    Comprehensive metabolic panel; Future    Cyanocobalamin (Vitamin B-12) 1000 MCG SUBL; Place 1 tablet (1,000 mcg total) under the tongue in the morning    metFORMIN (GLUCOPHAGE) 1000 MG tablet; Take 1 tablet (1,000 mg total) by mouth 2 (two) times a day with meals Pt reports that he should be taking 1,000mg BID but takes total 2,000mg once a day    rosuvastatin (CRESTOR) 10 MG tablet; Take 1 tablet (10 mg total) by mouth daily

## 2025-06-13 LAB
ALBUMIN SERPL-MCNC: 4.4 G/DL (ref 3.8–4.8)
ALP SERPL-CCNC: 117 IU/L (ref 44–121)
ALT SERPL-CCNC: 18 IU/L (ref 0–44)
AST SERPL-CCNC: 25 IU/L (ref 0–40)
BILIRUB SERPL-MCNC: 0.6 MG/DL (ref 0–1.2)
BUN SERPL-MCNC: 40 MG/DL (ref 8–27)
BUN/CREAT SERPL: 33 (ref 10–24)
CALCIUM SERPL-MCNC: 9.1 MG/DL (ref 8.6–10.2)
CHLORIDE SERPL-SCNC: 105 MMOL/L (ref 96–106)
CO2 SERPL-SCNC: 22 MMOL/L (ref 20–29)
CREAT SERPL-MCNC: 1.23 MG/DL (ref 0.76–1.27)
EGFR: 60 ML/MIN/1.73
GLOBULIN SER-MCNC: 2.1 G/DL (ref 1.5–4.5)
GLUCOSE SERPL-MCNC: 135 MG/DL (ref 70–99)
HCV AB S/CO SERPL IA: NON REACTIVE
POTASSIUM SERPL-SCNC: 5.2 MMOL/L (ref 3.5–5.2)
PROT SERPL-MCNC: 6.5 G/DL (ref 6–8.5)
PSA SERPL-MCNC: 1.5 NG/ML (ref 0–4)
SL AMB REFLEX CRITERIA: NORMAL
SODIUM SERPL-SCNC: 141 MMOL/L (ref 134–144)